# Patient Record
Sex: MALE | Race: WHITE | NOT HISPANIC OR LATINO | Employment: FULL TIME | URBAN - METROPOLITAN AREA
[De-identification: names, ages, dates, MRNs, and addresses within clinical notes are randomized per-mention and may not be internally consistent; named-entity substitution may affect disease eponyms.]

---

## 2020-03-31 ENCOUNTER — HOSPITAL ENCOUNTER (EMERGENCY)
Facility: HOSPITAL | Age: 35
Discharge: HOME | End: 2020-03-31
Attending: EMERGENCY MEDICINE

## 2020-03-31 VITALS
WEIGHT: 200 LBS | SYSTOLIC BLOOD PRESSURE: 135 MMHG | TEMPERATURE: 98 F | DIASTOLIC BLOOD PRESSURE: 80 MMHG | BODY MASS INDEX: 24.87 KG/M2 | HEART RATE: 85 BPM | RESPIRATION RATE: 18 BRPM | OXYGEN SATURATION: 99 % | HEIGHT: 75 IN

## 2020-03-31 DIAGNOSIS — R05.9 COUGH: ICD-10-CM

## 2020-03-31 DIAGNOSIS — J06.9 VIRAL URI WITH COUGH: Primary | ICD-10-CM

## 2020-03-31 LAB
INFLUENZA A, MOLECULAR: NEGATIVE
INFLUENZA B, MOLECULAR: NEGATIVE
SPECIMEN SOURCE: NORMAL

## 2020-03-31 PROCEDURE — 99283 EMERGENCY DEPT VISIT LOW MDM: CPT | Mod: 25

## 2020-03-31 PROCEDURE — 87502 INFLUENZA DNA AMP PROBE: CPT

## 2020-03-31 RX ORDER — PROMETHAZINE HYDROCHLORIDE AND DEXTROMETHORPHAN HYDROBROMIDE 6.25; 15 MG/5ML; MG/5ML
5 SYRUP ORAL 4 TIMES DAILY PRN
Qty: 240 ML | Refills: 0 | Status: SHIPPED | OUTPATIENT
Start: 2020-03-31 | End: 2020-04-10

## 2020-03-31 NOTE — ED PROVIDER NOTES
Encounter Date: 3/31/2020       History     Chief Complaint   Patient presents with    Cough     c/o SOB, Runny Nose, congestioin, sore throat, headache, ear pain. onset Friday      The history is provided by the patient.   URI   The primary symptoms include fever, fatigue and cough. Primary symptoms do not include sore throat, abdominal pain, nausea, vomiting or rash. The current episode started two days ago. This is a new problem. The problem has not changed since onset.  The cough is non-productive.   Symptoms associated with the illness include congestion and rhinorrhea. The following treatments were addressed: Acetaminophen was effective.     Review of patient's allergies indicates:   Allergen Reactions    Ultram [tramadol] Rash     Past Medical History:   Diagnosis Date    Back pain     Bulging disc     L5     No past surgical history on file.  No family history on file.  Social History     Tobacco Use    Smoking status: Current Every Day Smoker   Substance Use Topics    Alcohol use: No    Drug use: Not on file     Review of Systems   Constitutional: Positive for fatigue and fever. Negative for diaphoresis.   HENT: Positive for congestion and rhinorrhea. Negative for sore throat.    Eyes: Negative for photophobia and redness.   Respiratory: Positive for cough. Negative for shortness of breath.    Cardiovascular: Negative for chest pain.   Gastrointestinal: Negative for abdominal pain, constipation, diarrhea, nausea and vomiting.   Endocrine: Negative for polydipsia and polyphagia.   Genitourinary: Negative for dysuria and frequency.   Musculoskeletal: Negative for back pain.   Skin: Negative for rash.   Neurological: Negative for weakness.   Hematological: Does not bruise/bleed easily.   Psychiatric/Behavioral: The patient is not nervous/anxious.    All other systems reviewed and are negative.      Physical Exam     Initial Vitals [03/31/20 0816]   BP Pulse Resp Temp SpO2   (!) 170/83 104 18 98.8 °F  (37.1 °C) 99 %      MAP       --         Physical Exam    Nursing note and vitals reviewed.  Constitutional: He appears well-developed and well-nourished.   HENT:   Head: Normocephalic and atraumatic.   Right Ear: External ear normal.   Left Ear: External ear normal.   Nose: Nose normal.   Mouth/Throat: Oropharynx is clear and moist.   Eyes: Conjunctivae and EOM are normal. Pupils are equal, round, and reactive to light.   Neck: Normal range of motion. Neck supple.   Cardiovascular: Normal rate, regular rhythm, normal heart sounds and intact distal pulses.   Pulmonary/Chest: Breath sounds normal. No respiratory distress. He has no wheezes. He has no rhonchi. He has no rales.   Abdominal: Soft. Bowel sounds are normal. He exhibits no distension. There is no tenderness. There is no rebound and no guarding.   Musculoskeletal: Normal range of motion.   Neurological: He is alert and oriented to person, place, and time. He has normal strength.   Skin: Skin is warm and dry.   Psychiatric: He has a normal mood and affect. His behavior is normal. Judgment and thought content normal.         ED Course   Procedures  Labs Reviewed   INFLUENZA A & B BY MOLECULAR          Imaging Results    None                                          Clinical Impression:       ICD-10-CM ICD-9-CM   1. Viral URI with cough J06.9 465.9    B97.89    2. Cough R05 786.2         Disposition:   Disposition: Discharged  Condition: Stable                        KENNEDY Painting  03/31/20 0936

## 2021-06-07 ENCOUNTER — HOSPITAL ENCOUNTER (EMERGENCY)
Facility: HOSPITAL | Age: 36
Discharge: HOME | End: 2021-06-07
Attending: EMERGENCY MEDICINE
Payer: MEDICAID

## 2021-06-07 VITALS
DIASTOLIC BLOOD PRESSURE: 85 MMHG | WEIGHT: 203.69 LBS | BODY MASS INDEX: 25.33 KG/M2 | OXYGEN SATURATION: 99 % | TEMPERATURE: 99 F | HEIGHT: 75 IN | HEART RATE: 100 BPM | SYSTOLIC BLOOD PRESSURE: 158 MMHG | RESPIRATION RATE: 20 BRPM

## 2021-06-07 DIAGNOSIS — K02.9 DENTAL CARIES: Primary | ICD-10-CM

## 2021-06-07 PROCEDURE — 25000003 PHARM REV CODE 250: Performed by: EMERGENCY MEDICINE

## 2021-06-07 PROCEDURE — 99284 EMERGENCY DEPT VISIT MOD MDM: CPT | Mod: 25

## 2021-06-07 RX ORDER — AMOXICILLIN AND CLAVULANATE POTASSIUM 875; 125 MG/1; MG/1
1 TABLET, FILM COATED ORAL 2 TIMES DAILY
Qty: 10 TABLET | Refills: 0 | Status: SHIPPED | OUTPATIENT
Start: 2021-06-07 | End: 2021-06-12

## 2021-06-07 RX ORDER — ACETAMINOPHEN 500 MG
1000 TABLET ORAL
Status: COMPLETED | OUTPATIENT
Start: 2021-06-07 | End: 2021-06-07

## 2021-06-07 RX ORDER — KETOROLAC TROMETHAMINE 10 MG/1
10 TABLET, FILM COATED ORAL
Status: COMPLETED | OUTPATIENT
Start: 2021-06-07 | End: 2021-06-07

## 2021-06-07 RX ORDER — KETOROLAC TROMETHAMINE 10 MG/1
10 TABLET, FILM COATED ORAL EVERY 6 HOURS PRN
Qty: 20 TABLET | Refills: 0 | Status: SHIPPED | OUTPATIENT
Start: 2021-06-07 | End: 2022-11-28

## 2021-06-07 RX ADMIN — KETOROLAC TROMETHAMINE 10 MG: 10 TABLET, FILM COATED ORAL at 07:06

## 2021-06-07 RX ADMIN — ACETAMINOPHEN 1000 MG: 500 TABLET ORAL at 07:06

## 2022-09-22 ENCOUNTER — HOSPITAL ENCOUNTER (EMERGENCY)
Facility: HOSPITAL | Age: 37
Discharge: HOME | End: 2022-09-22
Attending: EMERGENCY MEDICINE
Payer: MEDICAID

## 2022-09-22 VITALS
RESPIRATION RATE: 18 BRPM | OXYGEN SATURATION: 97 % | DIASTOLIC BLOOD PRESSURE: 67 MMHG | TEMPERATURE: 98 F | WEIGHT: 190 LBS | BODY MASS INDEX: 23.62 KG/M2 | SYSTOLIC BLOOD PRESSURE: 121 MMHG | HEIGHT: 75 IN | HEART RATE: 73 BPM

## 2022-09-22 DIAGNOSIS — M54.50 ACUTE EXACERBATION OF CHRONIC LOW BACK PAIN: Primary | ICD-10-CM

## 2022-09-22 DIAGNOSIS — G89.29 ACUTE EXACERBATION OF CHRONIC LOW BACK PAIN: Primary | ICD-10-CM

## 2022-09-22 DIAGNOSIS — M54.32 SCIATICA OF LEFT SIDE: ICD-10-CM

## 2022-09-22 PROCEDURE — 25000003 PHARM REV CODE 250: Performed by: NURSE PRACTITIONER

## 2022-09-22 PROCEDURE — 63600175 PHARM REV CODE 636 W HCPCS: Performed by: NURSE PRACTITIONER

## 2022-09-22 PROCEDURE — 99284 EMERGENCY DEPT VISIT MOD MDM: CPT | Mod: 25

## 2022-09-22 PROCEDURE — 96372 THER/PROPH/DIAG INJ SC/IM: CPT | Performed by: NURSE PRACTITIONER

## 2022-09-22 RX ORDER — ONDANSETRON 8 MG/1
8 TABLET, ORALLY DISINTEGRATING ORAL
Status: COMPLETED | OUTPATIENT
Start: 2022-09-22 | End: 2022-09-22

## 2022-09-22 RX ORDER — DEXAMETHASONE SODIUM PHOSPHATE 4 MG/ML
10 INJECTION, SOLUTION INTRA-ARTICULAR; INTRALESIONAL; INTRAMUSCULAR; INTRAVENOUS; SOFT TISSUE
Status: COMPLETED | OUTPATIENT
Start: 2022-09-22 | End: 2022-09-22

## 2022-09-22 RX ORDER — TIZANIDINE 4 MG/1
4 TABLET ORAL EVERY 8 HOURS PRN
Qty: 20 TABLET | Refills: 0 | Status: SHIPPED | OUTPATIENT
Start: 2022-09-22 | End: 2022-10-02

## 2022-09-22 RX ORDER — MORPHINE SULFATE 4 MG/ML
8 INJECTION, SOLUTION INTRAMUSCULAR; INTRAVENOUS
Status: COMPLETED | OUTPATIENT
Start: 2022-09-22 | End: 2022-09-22

## 2022-09-22 RX ORDER — DICLOFENAC SODIUM 50 MG/1
50 TABLET, DELAYED RELEASE ORAL 3 TIMES DAILY PRN
Qty: 15 TABLET | Refills: 0 | Status: SHIPPED | OUTPATIENT
Start: 2022-09-22

## 2022-09-22 RX ADMIN — MORPHINE SULFATE 8 MG: 4 INJECTION INTRAVENOUS at 06:09

## 2022-09-22 RX ADMIN — DEXAMETHASONE SODIUM PHOSPHATE 10 MG: 4 INJECTION INTRA-ARTICULAR; INTRALESIONAL; INTRAMUSCULAR; INTRAVENOUS; SOFT TISSUE at 06:09

## 2022-09-22 RX ADMIN — ONDANSETRON 8 MG: 8 TABLET, ORALLY DISINTEGRATING ORAL at 06:09

## 2022-09-22 NOTE — Clinical Note
"Zane Horne (David) was seen and treated in our emergency department on 9/22/2022.  He may return to work on 09/24/2022.       If you have any questions or concerns, please don't hesitate to call.      Cheko Reynolds NP"

## 2022-09-23 NOTE — ED PROVIDER NOTES
HISTORY     Chief Complaint   Patient presents with    Back Pain     Pt complaining of lower back pain since this morning; hx of bulging discs     Review of patient's allergies indicates:   Allergen Reactions    Ultram [tramadol] Rash        HPI   The history is provided by the patient.   Back Pain   This is a recurrent problem. The current episode started today. The problem occurs throughout the day. The problem has been unchanged. The pain is associated with no known injury. The pain is present in the lumbar spine. The quality of the pain is described as aching. The pain does not radiate. The pain is at a severity of 10/10. The symptoms are aggravated by bending, twisting and certain positions. Associated symptoms include leg pain. Pertinent negatives include no chest pain, no fever, no abdominal pain, no abdominal swelling, no dysuria, no paresthesias, no paresis and no weakness. He has tried nothing for the symptoms. The treatment provided no relief. Risk factors: History of bulging disc.      PCP: Primary Doctor No     Past Medical History:  Past Medical History:   Diagnosis Date    Back pain     Bulging disc     L5        Past Surgical History:  No past surgical history on file.     Family History:  No family history on file.     Social History:  Social History     Tobacco Use    Smoking status: Every Day    Smokeless tobacco: Not on file   Substance and Sexual Activity    Alcohol use: No    Drug use: Not on file    Sexual activity: Not on file         ROS   Review of Systems   Constitutional:  Negative for fever.   HENT:  Negative for sore throat.    Respiratory:  Negative for shortness of breath.    Cardiovascular:  Negative for chest pain.   Gastrointestinal:  Negative for abdominal pain and nausea.   Genitourinary:  Negative for dysuria.   Musculoskeletal:  Positive for back pain.   Skin:  Negative for rash.   Neurological:  Negative for weakness and paresthesias.   Hematological:  Does not  "bruise/bleed easily.     PHYSICAL EXAM     Initial Vitals [09/22/22 1802]   BP Pulse Resp Temp SpO2   (!) 144/77 82 18 98.3 °F (36.8 °C) 97 %      MAP       --           Physical Exam    Constitutional: He appears well-developed and well-nourished. No distress.   HENT:   Head: Normocephalic and atraumatic.   Eyes: Conjunctivae are normal. Pupils are equal, round, and reactive to light.   Neck: Neck supple.   Normal range of motion.  Cardiovascular:  Normal rate, regular rhythm and normal heart sounds.           Pulmonary/Chest: Breath sounds normal.   Abdominal: Abdomen is soft. Bowel sounds are normal.   Musculoskeletal:         General: Normal range of motion.      Cervical back: Normal range of motion and neck supple.      Lumbar back: Spasms and tenderness present.        Back:      Neurological: He is alert and oriented to person, place, and time. No cranial nerve deficit. Coordination and gait normal.   Skin: Skin is warm and dry.   Psychiatric: He has a normal mood and affect.        ED COURSE   Procedures  ED ONGOING VITALS:  Vitals:    09/22/22 1802 09/22/22 1839 09/22/22 1939   BP: (!) 144/77  121/67   Pulse: 82  73   Resp: 18 (!) 22 18   Temp: 98.3 °F (36.8 °C)  98.3 °F (36.8 °C)   TempSrc: Oral     SpO2: 97%  97%   Weight: 86.2 kg (190 lb)     Height: 6' 3" (1.905 m)           ABNORMAL LAB VALUES:  Labs Reviewed - No data to display      ALL LAB VALUES:        RADIOLOGY STUDIES:  Imaging Results    None                   The above vital signs and test results have been reviewed by the emergency provider.     ED Medications:  Discharge Medication List as of 9/22/2022  7:38 PM        START taking these medications    Details   diclofenac (VOLTAREN) 50 MG EC tablet Take 1 tablet (50 mg total) by mouth 3 (three) times daily as needed., Starting Thu 9/22/2022, Print      tiZANidine (ZANAFLEX) 4 MG tablet Take 1 tablet (4 mg total) by mouth every 8 (eight) hours as needed., Starting Thu 9/22/2022, Until Sun " 10/2/2022 at 2359, Print           Discharge Medications:  Discharge Medication List as of 9/22/2022  7:38 PM        START taking these medications    Details   diclofenac (VOLTAREN) 50 MG EC tablet Take 1 tablet (50 mg total) by mouth 3 (three) times daily as needed., Starting Thu 9/22/2022, Print      tiZANidine (ZANAFLEX) 4 MG tablet Take 1 tablet (4 mg total) by mouth every 8 (eight) hours as needed., Starting u 9/22/2022, Until Sun 10/2/2022 at 2357, Print             Follow-up Information       Schedule an appointment as soon as possible for a visit  with PCP.               [facility]    Specialty: Emergency Medicine  Contact information:  [address] [facility] [id number] [phone]                          Pain relieving and patient is ready for discharge.    I discussed with patient and/or family/caretaker that evaluation in the ED does not suggest any emergent or life threatening medical conditions requiring immediate intervention beyond what was provided in the ED, and I believe patient is safe for discharge. Regardless, an unremarkable evaluation in the ED does not preclude the development or presence of a serious or life threatening condition. As such, patient was instructed to return immediately for any worsening or change in current symptoms.    Regarding SCIATIC PAIN, the patient has been counseled regarding a diagnosis of sciatica, including the possible sources for sciatic nerve pathology as well as the expectations for improvement.  There have been no focal neurologic findings at present.  Advised patient to take all medications as prescribed, follow up with primary care provider, and participate in activity as tolerated. The patient has been instructed to return to the ER immediately with any worsening symptoms including development of numbness, weakness, or incontinence.          MEDICAL DECISION MAKING                 CLINICAL IMPRESSION        ICD-10-CM ICD-9-CM   1. Acute exacerbation of chronic low back pain  M54.50 724.2    G89.29 338.19     338.29   2. Sciatica of left side  M54.32 724.3       Disposition:   Disposition: Discharged  Condition: Stable       Cheko Reynolds NP  09/22/22 7401

## 2022-11-28 ENCOUNTER — HOSPITAL ENCOUNTER (INPATIENT)
Facility: HOSPITAL | Age: 37
LOS: 7 days | Discharge: HOME | DRG: 853 | End: 2022-12-05
Attending: EMERGENCY MEDICINE | Admitting: INTERNAL MEDICINE
Payer: MEDICAID

## 2022-11-28 DIAGNOSIS — J90 PLEURAL EFFUSION: ICD-10-CM

## 2022-11-28 DIAGNOSIS — J18.9 PNEUMONIA OF LEFT LOWER LOBE DUE TO INFECTIOUS ORGANISM: ICD-10-CM

## 2022-11-28 DIAGNOSIS — R06.00 DYSPNEA: ICD-10-CM

## 2022-11-28 DIAGNOSIS — J96.01 ACUTE RESPIRATORY FAILURE WITH HYPOXIA: Primary | ICD-10-CM

## 2022-11-28 PROBLEM — Z72.0 TOBACCO USE: Status: ACTIVE | Noted: 2022-11-28

## 2022-11-28 PROBLEM — R07.1 CHEST PAIN ON BREATHING: Status: ACTIVE | Noted: 2022-11-28

## 2022-11-28 PROBLEM — A41.9 SEPSIS WITHOUT ACUTE ORGAN DYSFUNCTION: Status: ACTIVE | Noted: 2022-11-28

## 2022-11-28 LAB
ALBUMIN SERPL BCP-MCNC: 3.1 G/DL (ref 3.5–5.2)
ALP SERPL-CCNC: 128 U/L (ref 55–135)
ALT SERPL W/O P-5'-P-CCNC: 65 U/L (ref 10–44)
ANION GAP SERPL CALC-SCNC: 15 MMOL/L (ref 8–16)
APPEARANCE FLD: NORMAL
AST SERPL-CCNC: 51 U/L (ref 10–40)
BACTERIA #/AREA URNS HPF: NORMAL /HPF
BASOPHILS # BLD AUTO: 0.03 K/UL (ref 0–0.2)
BASOPHILS NFR BLD: 0.2 % (ref 0–1.9)
BILIRUB SERPL-MCNC: 3.4 MG/DL (ref 0.1–1)
BILIRUB UR QL STRIP: ABNORMAL
BNP SERPL-MCNC: <10 PG/ML (ref 0–99)
BODY FLD TYPE: NORMAL
BUN SERPL-MCNC: 12 MG/DL (ref 6–20)
CALCIUM SERPL-MCNC: 9.9 MG/DL (ref 8.7–10.5)
CHLORIDE SERPL-SCNC: 104 MMOL/L (ref 95–110)
CLARITY UR: CLEAR
CO2 SERPL-SCNC: 17 MMOL/L (ref 23–29)
COLOR FLD: NORMAL
COLOR UR: ABNORMAL
CREAT SERPL-MCNC: 0.8 MG/DL (ref 0.5–1.4)
D DIMER PPP IA.FEU-MCNC: 2.68 MG/L FEU
DIFFERENTIAL METHOD: ABNORMAL
EOSINOPHIL # BLD AUTO: 0 K/UL (ref 0–0.5)
EOSINOPHIL NFR BLD: 0.1 % (ref 0–8)
EOSINOPHIL NFR FLD MANUAL: 59 %
ERYTHROCYTE [DISTWIDTH] IN BLOOD BY AUTOMATED COUNT: 12.2 % (ref 11.5–14.5)
EST. GFR  (NO RACE VARIABLE): >60 ML/MIN/1.73 M^2
GLUCOSE SERPL-MCNC: 145 MG/DL (ref 70–110)
GLUCOSE UR QL STRIP: ABNORMAL
HCT VFR BLD AUTO: 39.2 % (ref 40–54)
HGB BLD-MCNC: 13.7 G/DL (ref 14–18)
HGB UR QL STRIP: NEGATIVE
HYALINE CASTS #/AREA URNS LPF: 0 /LPF
IMM GRANULOCYTES # BLD AUTO: 0.07 K/UL (ref 0–0.04)
IMM GRANULOCYTES NFR BLD AUTO: 0.4 % (ref 0–0.5)
INFLUENZA A, MOLECULAR: NEGATIVE
INFLUENZA B, MOLECULAR: NEGATIVE
KETONES UR QL STRIP: NEGATIVE
LACTATE SERPL-SCNC: 1.3 MMOL/L (ref 0.5–2.2)
LEUKOCYTE ESTERASE UR QL STRIP: NEGATIVE
LYMPHOCYTES # BLD AUTO: 0.7 K/UL (ref 1–4.8)
LYMPHOCYTES NFR BLD: 4.1 % (ref 18–48)
LYMPHOCYTES NFR FLD MANUAL: 2 %
MCH RBC QN AUTO: 27.8 PG (ref 27–31)
MCHC RBC AUTO-ENTMCNC: 34.9 G/DL (ref 32–36)
MCV RBC AUTO: 80 FL (ref 82–98)
MICROSCOPIC COMMENT: NORMAL
MONOCYTES # BLD AUTO: 1.2 K/UL (ref 0.3–1)
MONOCYTES NFR BLD: 6.9 % (ref 4–15)
MONOS+MACROS NFR FLD MANUAL: 9 %
NEUTROPHILS # BLD AUTO: 15.7 K/UL (ref 1.8–7.7)
NEUTROPHILS NFR BLD: 88.3 % (ref 38–73)
NEUTROPHILS NFR FLD MANUAL: 30 %
NITRITE UR QL STRIP: NEGATIVE
NRBC BLD-RTO: 0 /100 WBC
PH UR STRIP: 7 [PH] (ref 5–8)
PLATELET # BLD AUTO: 287 K/UL (ref 150–450)
PMV BLD AUTO: 9.5 FL (ref 9.2–12.9)
POTASSIUM SERPL-SCNC: 3.8 MMOL/L (ref 3.5–5.1)
PROT SERPL-MCNC: 8.3 G/DL (ref 6–8.4)
PROT UR QL STRIP: ABNORMAL
RBC # BLD AUTO: 4.93 M/UL (ref 4.6–6.2)
RBC #/AREA URNS HPF: 2 /HPF (ref 0–4)
SARS-COV-2 RDRP RESP QL NAA+PROBE: NEGATIVE
SODIUM SERPL-SCNC: 136 MMOL/L (ref 136–145)
SP GR UR STRIP: >1.03 (ref 1–1.03)
SPECIMEN SOURCE: NORMAL
TROPONIN I SERPL DL<=0.01 NG/ML-MCNC: <0.006 NG/ML (ref 0–0.03)
URN SPEC COLLECT METH UR: ABNORMAL
UROBILINOGEN UR STRIP-ACNC: >=8 EU/DL
WBC # BLD AUTO: 17.76 K/UL (ref 3.9–12.7)
WBC # FLD: NORMAL /CU MM
WBC #/AREA URNS HPF: 2 /HPF (ref 0–5)
WBC CLUMPS URNS QL MICRO: NORMAL

## 2022-11-28 PROCEDURE — 93010 ELECTROCARDIOGRAM REPORT: CPT | Mod: ,,, | Performed by: STUDENT IN AN ORGANIZED HEALTH CARE EDUCATION/TRAINING PROGRAM

## 2022-11-28 PROCEDURE — 99291 CRITICAL CARE FIRST HOUR: CPT | Mod: 25

## 2022-11-28 PROCEDURE — 88184 FLOWCYTOMETRY/ TC 1 MARKER: CPT | Performed by: PATHOLOGY

## 2022-11-28 PROCEDURE — 82945 GLUCOSE OTHER FLUID: CPT | Performed by: NURSE PRACTITIONER

## 2022-11-28 PROCEDURE — 88189 FLOWCYTOMETRY/READ 16 & >: CPT | Mod: ,,, | Performed by: PATHOLOGY

## 2022-11-28 PROCEDURE — 83615 LACTATE (LD) (LDH) ENZYME: CPT | Performed by: NURSE PRACTITIONER

## 2022-11-28 PROCEDURE — 94640 AIRWAY INHALATION TREATMENT: CPT

## 2022-11-28 PROCEDURE — 25000003 PHARM REV CODE 250: Performed by: NURSE PRACTITIONER

## 2022-11-28 PROCEDURE — 25000003 PHARM REV CODE 250: Performed by: INTERNAL MEDICINE

## 2022-11-28 PROCEDURE — 94761 N-INVAS EAR/PLS OXIMETRY MLT: CPT

## 2022-11-28 PROCEDURE — 89051 BODY FLUID CELL COUNT: CPT | Performed by: NURSE PRACTITIONER

## 2022-11-28 PROCEDURE — 93005 ELECTROCARDIOGRAM TRACING: CPT

## 2022-11-28 PROCEDURE — 25500020 PHARM REV CODE 255: Performed by: INTERNAL MEDICINE

## 2022-11-28 PROCEDURE — 87102 FUNGUS ISOLATION CULTURE: CPT | Performed by: NURSE PRACTITIONER

## 2022-11-28 PROCEDURE — 88189 PR  FLOWCYTOMETRY/READ, 16 & > MARKERS: ICD-10-PCS | Mod: ,,, | Performed by: PATHOLOGY

## 2022-11-28 PROCEDURE — 63600175 PHARM REV CODE 636 W HCPCS: Performed by: EMERGENCY MEDICINE

## 2022-11-28 PROCEDURE — 84157 ASSAY OF PROTEIN OTHER: CPT | Performed by: NURSE PRACTITIONER

## 2022-11-28 PROCEDURE — 25000242 PHARM REV CODE 250 ALT 637 W/ HCPCS: Performed by: EMERGENCY MEDICINE

## 2022-11-28 PROCEDURE — 99900035 HC TECH TIME PER 15 MIN (STAT)

## 2022-11-28 PROCEDURE — 81000 URINALYSIS NONAUTO W/SCOPE: CPT | Performed by: NURSE PRACTITIONER

## 2022-11-28 PROCEDURE — 87210 SMEAR WET MOUNT SALINE/INK: CPT | Performed by: NURSE PRACTITIONER

## 2022-11-28 PROCEDURE — 87116 MYCOBACTERIA CULTURE: CPT | Performed by: NURSE PRACTITIONER

## 2022-11-28 PROCEDURE — 27000221 HC OXYGEN, UP TO 24 HOURS

## 2022-11-28 PROCEDURE — 25000003 PHARM REV CODE 250: Performed by: EMERGENCY MEDICINE

## 2022-11-28 PROCEDURE — 87502 INFLUENZA DNA AMP PROBE: CPT | Performed by: EMERGENCY MEDICINE

## 2022-11-28 PROCEDURE — 20000000 HC ICU ROOM

## 2022-11-28 PROCEDURE — 93010 EKG 12-LEAD: ICD-10-PCS | Mod: ,,, | Performed by: STUDENT IN AN ORGANIZED HEALTH CARE EDUCATION/TRAINING PROGRAM

## 2022-11-28 PROCEDURE — 83880 ASSAY OF NATRIURETIC PEPTIDE: CPT | Performed by: EMERGENCY MEDICINE

## 2022-11-28 PROCEDURE — 96365 THER/PROPH/DIAG IV INF INIT: CPT | Mod: 59

## 2022-11-28 PROCEDURE — U0002 COVID-19 LAB TEST NON-CDC: HCPCS | Performed by: EMERGENCY MEDICINE

## 2022-11-28 PROCEDURE — 63600175 PHARM REV CODE 636 W HCPCS: Performed by: NURSE PRACTITIONER

## 2022-11-28 PROCEDURE — 85379 FIBRIN DEGRADATION QUANT: CPT | Performed by: NURSE PRACTITIONER

## 2022-11-28 PROCEDURE — 87206 SMEAR FLUORESCENT/ACID STAI: CPT | Performed by: NURSE PRACTITIONER

## 2022-11-28 PROCEDURE — 87205 SMEAR GRAM STAIN: CPT | Performed by: NURSE PRACTITIONER

## 2022-11-28 PROCEDURE — 84484 ASSAY OF TROPONIN QUANT: CPT | Performed by: EMERGENCY MEDICINE

## 2022-11-28 PROCEDURE — 80053 COMPREHEN METABOLIC PANEL: CPT | Performed by: EMERGENCY MEDICINE

## 2022-11-28 PROCEDURE — 87040 BLOOD CULTURE FOR BACTERIA: CPT | Mod: 59 | Performed by: EMERGENCY MEDICINE

## 2022-11-28 PROCEDURE — 87070 CULTURE OTHR SPECIMN AEROBIC: CPT | Performed by: NURSE PRACTITIONER

## 2022-11-28 PROCEDURE — 87081 CULTURE SCREEN ONLY: CPT | Performed by: NURSE PRACTITIONER

## 2022-11-28 PROCEDURE — 85025 COMPLETE CBC W/AUTO DIFF WBC: CPT | Performed by: EMERGENCY MEDICINE

## 2022-11-28 PROCEDURE — 83605 ASSAY OF LACTIC ACID: CPT | Performed by: NURSE PRACTITIONER

## 2022-11-28 PROCEDURE — 88185 FLOWCYTOMETRY/TC ADD-ON: CPT | Performed by: PATHOLOGY

## 2022-11-28 RX ORDER — POTASSIUM CHLORIDE 29.8 MG/ML
20 INJECTION INTRAVENOUS
Status: CANCELLED | OUTPATIENT
Start: 2022-11-28

## 2022-11-28 RX ORDER — MUPIROCIN 20 MG/G
OINTMENT TOPICAL 2 TIMES DAILY
Status: DISPENSED | OUTPATIENT
Start: 2022-11-28 | End: 2022-12-03

## 2022-11-28 RX ORDER — TALC
6 POWDER (GRAM) TOPICAL NIGHTLY PRN
Status: DISCONTINUED | OUTPATIENT
Start: 2022-11-28 | End: 2022-12-05 | Stop reason: HOSPADM

## 2022-11-28 RX ORDER — POTASSIUM CHLORIDE 14.9 MG/ML
40 INJECTION INTRAVENOUS EVERY 4 HOURS PRN
Status: CANCELLED | OUTPATIENT
Start: 2022-11-28

## 2022-11-28 RX ORDER — DEXTROSE MONOHYDRATE AND SODIUM CHLORIDE 5; .45 G/100ML; G/100ML
INJECTION, SOLUTION INTRAVENOUS CONTINUOUS
Status: CANCELLED | OUTPATIENT
Start: 2022-11-28

## 2022-11-28 RX ORDER — SERTRALINE HYDROCHLORIDE 50 MG/1
50 TABLET, FILM COATED ORAL DAILY
COMMUNITY
Start: 2022-09-05

## 2022-11-28 RX ORDER — DEXTROSE MONOHYDRATE 100 MG/ML
INJECTION, SOLUTION INTRAVENOUS
Status: CANCELLED | OUTPATIENT
Start: 2022-11-28

## 2022-11-28 RX ORDER — IPRATROPIUM BROMIDE AND ALBUTEROL SULFATE 2.5; .5 MG/3ML; MG/3ML
3 SOLUTION RESPIRATORY (INHALATION) EVERY 6 HOURS PRN
Status: DISCONTINUED | OUTPATIENT
Start: 2022-11-28 | End: 2022-12-05 | Stop reason: HOSPADM

## 2022-11-28 RX ORDER — PREDNISONE 20 MG/1
TABLET ORAL
COMMUNITY
Start: 2022-08-31 | End: 2022-11-28

## 2022-11-28 RX ORDER — ALPRAZOLAM 0.25 MG/1
0.25 TABLET ORAL 3 TIMES DAILY PRN
Status: DISCONTINUED | OUTPATIENT
Start: 2022-11-28 | End: 2022-12-05 | Stop reason: HOSPADM

## 2022-11-28 RX ORDER — ACETAMINOPHEN 325 MG/1
650 TABLET ORAL EVERY 6 HOURS PRN
Status: DISCONTINUED | OUTPATIENT
Start: 2022-11-28 | End: 2022-12-01

## 2022-11-28 RX ORDER — POTASSIUM CHLORIDE 14.9 MG/ML
20 INJECTION INTRAVENOUS
Status: CANCELLED | OUTPATIENT
Start: 2022-11-28

## 2022-11-28 RX ORDER — SERTRALINE HYDROCHLORIDE 50 MG/1
50 TABLET, FILM COATED ORAL NIGHTLY
Status: DISCONTINUED | OUTPATIENT
Start: 2022-11-28 | End: 2022-12-05 | Stop reason: HOSPADM

## 2022-11-28 RX ORDER — METHYLPREDNISOLONE SOD SUCC 125 MG
125 VIAL (EA) INJECTION
Status: DISCONTINUED | OUTPATIENT
Start: 2022-11-28 | End: 2022-11-28 | Stop reason: SDUPTHER

## 2022-11-28 RX ORDER — POTASSIUM CHLORIDE 7.45 MG/ML
10 INJECTION INTRAVENOUS
Status: CANCELLED | OUTPATIENT
Start: 2022-11-28

## 2022-11-28 RX ORDER — POTASSIUM CHLORIDE 29.8 MG/ML
40 INJECTION INTRAVENOUS EVERY 4 HOURS PRN
Status: CANCELLED | OUTPATIENT
Start: 2022-11-28

## 2022-11-28 RX ORDER — METHYLPREDNISOLONE SOD SUCC 125 MG
125 VIAL (EA) INJECTION
Status: COMPLETED | OUTPATIENT
Start: 2022-11-28 | End: 2022-11-28

## 2022-11-28 RX ORDER — POTASSIUM CHLORIDE 14.9 MG/ML
10 INJECTION INTRAVENOUS
Status: CANCELLED | OUTPATIENT
Start: 2022-11-28

## 2022-11-28 RX ORDER — HYDROCODONE BITARTRATE AND ACETAMINOPHEN 10; 325 MG/1; MG/1
1 TABLET ORAL EVERY 6 HOURS PRN
Status: DISCONTINUED | OUTPATIENT
Start: 2022-11-28 | End: 2022-11-30

## 2022-11-28 RX ORDER — SODIUM CHLORIDE 9 MG/ML
INJECTION, SOLUTION INTRAVENOUS CONTINUOUS
Status: DISCONTINUED | OUTPATIENT
Start: 2022-11-28 | End: 2022-11-29

## 2022-11-28 RX ORDER — SODIUM CHLORIDE 0.9 % (FLUSH) 0.9 %
10 SYRINGE (ML) INJECTION
Status: DISCONTINUED | OUTPATIENT
Start: 2022-11-28 | End: 2022-12-05 | Stop reason: HOSPADM

## 2022-11-28 RX ORDER — ONDANSETRON 2 MG/ML
4 INJECTION INTRAMUSCULAR; INTRAVENOUS EVERY 6 HOURS PRN
Status: DISCONTINUED | OUTPATIENT
Start: 2022-11-28 | End: 2022-11-30

## 2022-11-28 RX ORDER — ENOXAPARIN SODIUM 100 MG/ML
40 INJECTION SUBCUTANEOUS EVERY 24 HOURS
Status: DISCONTINUED | OUTPATIENT
Start: 2022-11-28 | End: 2022-11-28

## 2022-11-28 RX ORDER — SODIUM CHLORIDE 0.9 % (FLUSH) 0.9 %
10 SYRINGE (ML) INJECTION
Status: CANCELLED | OUTPATIENT
Start: 2022-11-28

## 2022-11-28 RX ORDER — IPRATROPIUM BROMIDE AND ALBUTEROL SULFATE 2.5; .5 MG/3ML; MG/3ML
3 SOLUTION RESPIRATORY (INHALATION)
Status: DISPENSED | OUTPATIENT
Start: 2022-11-28 | End: 2022-11-28

## 2022-11-28 RX ORDER — SODIUM CHLORIDE 450 MG/100ML
INJECTION, SOLUTION INTRAVENOUS CONTINUOUS
Status: CANCELLED | OUTPATIENT
Start: 2022-11-28

## 2022-11-28 RX ADMIN — CEFTRIAXONE 1 G: 1 INJECTION, SOLUTION INTRAVENOUS at 02:11

## 2022-11-28 RX ADMIN — SODIUM CHLORIDE: 0.9 INJECTION, SOLUTION INTRAVENOUS at 05:11

## 2022-11-28 RX ADMIN — SERTRALINE HYDROCHLORIDE 50 MG: 50 TABLET ORAL at 08:11

## 2022-11-28 RX ADMIN — VANCOMYCIN HYDROCHLORIDE 2250 MG: 10 INJECTION, POWDER, LYOPHILIZED, FOR SOLUTION INTRAVENOUS at 11:11

## 2022-11-28 RX ADMIN — MUPIROCIN: 20 OINTMENT TOPICAL at 08:11

## 2022-11-28 RX ADMIN — SODIUM CHLORIDE: 0.9 INJECTION, SOLUTION INTRAVENOUS at 08:11

## 2022-11-28 RX ADMIN — IPRATROPIUM BROMIDE AND ALBUTEROL SULFATE 3 ML: 2.5; .5 SOLUTION RESPIRATORY (INHALATION) at 01:11

## 2022-11-28 RX ADMIN — ONDANSETRON 4 MG: 2 INJECTION INTRAMUSCULAR; INTRAVENOUS at 11:11

## 2022-11-28 RX ADMIN — PIPERACILLIN SODIUM AND TAZOBACTAM SODIUM 4.5 G: 4; .5 INJECTION, POWDER, LYOPHILIZED, FOR SOLUTION INTRAVENOUS at 02:11

## 2022-11-28 RX ADMIN — Medication 6 MG: at 08:11

## 2022-11-28 RX ADMIN — HYDROCODONE BITARTRATE AND ACETAMINOPHEN 1 TABLET: 10; 325 TABLET ORAL at 02:11

## 2022-11-28 RX ADMIN — HYDROCODONE BITARTRATE AND ACETAMINOPHEN 1 TABLET: 10; 325 TABLET ORAL at 08:11

## 2022-11-28 RX ADMIN — METHYLPREDNISOLONE SODIUM SUCCINATE 125 MG: 125 INJECTION, POWDER, FOR SOLUTION INTRAMUSCULAR; INTRAVENOUS at 03:11

## 2022-11-28 RX ADMIN — SODIUM CHLORIDE 1000 ML: 0.9 INJECTION, SOLUTION INTRAVENOUS at 08:11

## 2022-11-28 RX ADMIN — IOHEXOL 100 ML: 350 INJECTION, SOLUTION INTRAVENOUS at 08:11

## 2022-11-28 RX ADMIN — ALPRAZOLAM 0.25 MG: 0.25 TABLET ORAL at 08:11

## 2022-11-28 RX ADMIN — AZITHROMYCIN MONOHYDRATE 500 MG: 500 INJECTION, POWDER, LYOPHILIZED, FOR SOLUTION INTRAVENOUS at 02:11

## 2022-11-28 RX ADMIN — PIPERACILLIN SODIUM AND TAZOBACTAM SODIUM 4.5 G: 4; .5 INJECTION, POWDER, LYOPHILIZED, FOR SOLUTION INTRAVENOUS at 11:11

## 2022-11-28 NOTE — PHARMACY MED REC
"Admission Medication History     The home medication history was taken by Len Weber.    You may go to "Admission" then "Reconcile Home Medications" tabs to review and/or act upon these items.     The home medication list has been updated by the Pharmacy department.   Please read ALL comments highlighted in yellow.   Please address this information as you see fit.    Feel free to contact us if you have any questions or require assistance.      The medications listed below were removed from the home medication list. Please reorder if appropriate:  Patient reports no longer taking the following medication(s):  TYLENOL #3  TORADOL 10MG  PREDNISONE 20MG    Medications listed below were obtained from: Analytic software- [employer] and Medical records  (Not in a hospital admission)      Len Weber  [id number]    Current Outpatient Medications on File Prior to Encounter   Medication Sig Dispense Refill Last Dose    diclofenac (VOLTAREN) 50 MG EC tablet Take 1 tablet (50 mg total) by mouth 3 (three) times daily as needed. 15 tablet 0 Past Month    sertraline (ZOLOFT) 50 MG tablet Take 50 mg by mouth once daily.   11/27/2022                           .        "

## 2022-11-28 NOTE — ASSESSMENT & PLAN NOTE
Patient with Hypoxic Respiratory failure which is Acute.  he is not on home oxygen. Supplemental oxygen was provided and noted-  .   Signs/symptoms of respiratory failure include- tachypnea and increased work of breathing. Contributing diagnoses includes - Pneumonia Labs and images were reviewed. Patient Has not had a recent ABG. Will treat underlying causes and adjust management of respiratory failure as follows.  - continue iv abx for pna  - duo nebs as needed  - wean o2 to keep sats > 90%

## 2022-11-28 NOTE — ASSESSMENT & PLAN NOTE
- CXR and CT imaging reviewed  - continue broad spec abx, vanc added, deescalate as able based on cultures and clinical course   - obtain MRSA swab   - thora today with IR for evaluation of pleural fluid  - concern for empyema given labs, presentation, and amount of discomfort from pt  - based on fluid study results will further develop plan moving forward   - continue with O2 as needed to maintain sat of 92% or better   - pain meds per primary team   - OOB and ambulate as able

## 2022-11-28 NOTE — ASSESSMENT & PLAN NOTE
- likely pleuritic in nature, worse with cough/deep breath  - tend troponin, have been negative thus far  - po pain medication prn

## 2022-11-28 NOTE — HPI
37 year old male with a known past medical history of depression and tobacco use (1 pack per day x 15 years) who presented to the ED with complaints of worsening shortness of breath x 3 days assocaited with productive cough and chest pain. Symptoms are constant and moderate in nature, no relieving or exacerbating factors.  Upon arrival in the ED, patient was noted to be tachypnic, tachycardiac, and hypoxiac with O2 sats mid 80's on room air.  Pt was placed on 2 liters NC with improvement.  Lab work notable for WBC 17, bili 3.4, ALT 65, AST 51, normal troponin and BNP.  EKG showed ST.  Chest xray with concern for pneumonia.  Patient was given fluid bolus, blood cultures drawn, started on abx, and breathing treatments.  [place] [facility] [place] Pulmonary consulted 11/28 for further evaluation and recommendations. CT surgery consulted and pt was taken to the OR 11/30 for VATS.

## 2022-11-28 NOTE — PROVIDER PROGRESS NOTES - EMERGENCY DEPT.
Encounter Date: 11/28/2022    ED Physician Progress Notes       SCRIBE NOTE: I, Eve Lee, am scribing for, and in the presence of,  Neftaly Shay MD.  Physician Statement: I, Neftaly Shay MD, personally performed the services described in this documentation as scribed by Eve Lee in my presence, and it is both accurate and complete.       2:20 AM: Dr. Chan transfers care of patient to Dr. Shay.    3:57 AM: Discussed case with Mariza Lopez NP ([facility]). Dr. Anthony agrees with current care and management of pt and accepts admission.   Admitting Service: Hospital Medicine   Admitting Physician: Dr. Anthony  Admit to: Inpatient     3:58 AM: Re-evaluated pt. I have discussed test results, shared treatment plan, and the need for admission with patient and family at bedside. Pt and family express understanding at this time and agree with all information. All questions answered. Pt and family have no further questions or concerns at this time. Pt is ready for admit.        Scribe Attestation:   Scribe #1: I performed the above scribed service and the documentation accurately describes the services I performed. I attest to the accuracy of the note.    Attending Attestation:           Physician Attestation for Scribe:  Physician Attestation Statement for Scribe #1: I, Neftaly Shay MD, reviewed documentation, as scribed by Eve Lee in my presence, and it is both accurate and complete.

## 2022-11-28 NOTE — OP NOTE
Pre Op Diagnosis: left loculated pleural effusion     Post Op Diagnosis: same     Procedure:  left thoracentesis     Procedure performed by: Thee HARMAN, Sina BABCOCK     Written Informed Consent Obtained: Yes     Specimen Removed:  yes     Estimated Blood Loss:  minimal     Findings: Local anesthesia and moderate sedation were used.     The patient tolerated the procedure well and there were no complications.      Disposition:  F/U in clinic or with ordering physician    Discharge instructions:  Light activity for 24 hours.  Remove band aid in 24 hours.  No baths (showers are appropriate).      Sterile technique was performed in the posterior left thorax, lidocaine was used as a local anesthetic.  250 ccs of dark anibal fluid removed and sent to lab.  Pt tolerated the procedure well without immediate complications.  Please see radiologist report for details. F/u with PCP and/or ordering physician.

## 2022-11-28 NOTE — HPI
Mr. Horne is a 37 year old male with a history of depression and tobacco use (1 pack per day x 15 years) who presents to the ED with complaints of worsening shortness of breath x 3 days assocaited with productive cough and chest pain, symptoms are constant and moderate in nature, no relieving or exacerbating factors.  Upon arrival in the ED, patient tachypneic, tachycardiac, o2 sats mid 80's on room air.  Placed on 2 liters with noted improvement.  Lab work notable for WBC 17, bili 3.4,ALT 65, AST 51, normal troponin and BNP.  EKG showed ST.  Lactic pending.  Chest xray showed PNA.  Patient was given bolus, blood cultures drawn, started on abx and breathing treatments.  He will be admitted to hospital medicine service for acute hypoxic resp failure/sepsis d/t pna.     At assessment, patient lying in bed, o2 sats stable on 2 liters, complaints of yellow tinged productive cough and chest pain that is worse with movement, cough or deep breath. Of note, earlier last month patient did have a lap cr and has since followed up with his surgeon and has been doing well since that time.     Code Status, Full  Surrogate Decision Maker, wife, Mary

## 2022-11-28 NOTE — SUBJECTIVE & OBJECTIVE
Past Medical History:   Diagnosis Date    Back pain     Bulging disc     L5       History reviewed. No pertinent surgical history.    Review of patient's allergies indicates:   Allergen Reactions    Ultram [tramadol] Rash       Family History    None       Tobacco Use    Smoking status: Every Day    Smokeless tobacco: Not on file   Substance and Sexual Activity    Alcohol use: No    Drug use: Not on file    Sexual activity: Not on file         Review of Systems   Constitutional:  Positive for activity change, chills and fatigue.   HENT:  Positive for dental problem.    Respiratory:  Positive for cough and shortness of breath.         Left pleuritic chest pain   All other systems reviewed and are negative.  Objective:     Vital Signs (Most Recent):  Temp: 98.7 °F (37.1 °C) (11/28/22 0053)  Pulse: (!) 119 (11/28/22 0942)  Resp: (!) 30 (11/28/22 0942)  BP: (!) 141/87 (11/28/22 0942)  SpO2: (!) 90 % (11/28/22 0942)   Vital Signs (24h Range):  Temp:  [98.7 °F (37.1 °C)] 98.7 °F (37.1 °C)  Pulse:  [111-122] 119  Resp:  [30-50] 30  SpO2:  [90 %-98 %] 90 %  BP: (140-163)/(85-99) 141/87     Weight: 87.4 kg (192 lb 10.9 oz)  Body mass index is 24.08 kg/m².      Intake/Output Summary (Last 24 hours) at 11/28/2022 1004  Last data filed at 11/28/2022 0405  Gross per 24 hour   Intake 301.6 ml   Output --   Net 301.6 ml       Physical Exam    Vents:       Lines/Drains/Airways       Peripheral Intravenous Line  Duration                  Peripheral IV - Single Lumen 11/28/22 0259 20 G Anterior;Distal;Left Upper Arm <1 day                    Significant Labs:    CBC/Anemia Profile:  Recent Labs   Lab 11/28/22 0256   WBC 17.76*   HGB 13.7*   HCT 39.2*      MCV 80*   RDW 12.2        Chemistries:  Recent Labs   Lab 11/28/22 0256      K 3.8      CO2 17*   BUN 12   CREATININE 0.8   CALCIUM 9.9   ALBUMIN 3.1*   PROT 8.3   BILITOT 3.4*   ALKPHOS 128   ALT 65*   AST 51*       All pertinent labs within the past 24 hours  have been reviewed.    Significant Imaging:   I have reviewed all pertinent imaging results/findings within the past 24 hours.

## 2022-11-28 NOTE — H&P
Formerly [facility] - Emergency Dept.  [facility] Medicine  History & Physical    Patient Name: Zane Horne Jr.  MRN: [id number]  Patient Class: IP- Inpatient  Admission Date: 11/28/2022  Attending Physician: Nik Anthony MD   Primary Care Provider: Primary Doctor No         Patient information was obtained from patient and ER records.     Subjective:     Principal Problem:Sepsis without acute organ dysfunction    Chief Complaint:   Chief Complaint   Patient presents with    Shortness of Breath     Pt states he has been dealing with worsening SOB and congestion for the past 3 days. Pt oxygen sat was 88 on RA. Pt is also having left sided chest pain. Pt given 100mcg fentanyl om route.          HPI:    Mr. Horne is a 37 year old male with a history of depression and tobacco use (1 pack per day x 15 years) who presents to the ED with complaints of worsening shortness of breath x 3 days assocaited with productive cough and chest pain, symptoms are constant and moderate in nature, no relieving or exacerbating factors.  Upon arrival in the ED, patient tachypneic, tachycardiac, o2 sats mid 80's on room air.  Placed on 2 liters with noted improvement.  Lab work notable for WBC 17, bili 3.4,ALT 65, AST 51, normal troponin and BNP.  EKG showed ST.  Lactic pending.  Chest xray showed PNA.  Patient was given bolus, blood cultures drawn, started on abx and breathing treatments.  He will be admitted to hospital medicine service for acute hypoxic resp failure/sepsis d/t pna.     At assessment, patient lying in bed, o2 sats stable on 2 liters, complaints of yellow tinged productive cough and chest pain that is worse with movement, cough or deep breath. Of note, earlier last month patient did have a lap cr and has since followed up with his surgeon and has been doing well since that time.     Code Status, Full  Surrogate Decision Maker, wife, Mary      Past Medical History:   Diagnosis Date    Back pain     Bulging disc     L5       No  past surgical history on file.    Review of patient's allergies indicates:   Allergen Reactions    Ultram [tramadol] Rash       No current facility-administered medications on file prior to encounter.     Current Outpatient Medications on File Prior to Encounter   Medication Sig    diclofenac (VOLTAREN) 50 MG EC tablet Take 1 tablet (50 mg total) by mouth 3 (three) times daily as needed.    predniSONE (DELTASONE) 20 MG tablet prednisone Take 2 tablet (oral) 1 time per day for 3 days 20220831 tablet 1 time per day oral 3 days active 20 MG    sertraline (ZOLOFT) 50 MG tablet Take 50 mg by mouth.    acetaminophen-codeine 300-30mg (TYLENOL #3) 300-30 mg Tab Take 1-2 tablets by mouth every 6 (six) hours as needed.    ketorolac (TORADOL) 10 mg tablet Take 1 tablet (10 mg total) by mouth every 6 (six) hours as needed for Pain.     Family History    None       Tobacco Use    Smoking status: Every Day    Smokeless tobacco: Not on file   Substance and Sexual Activity    Alcohol use: No    Drug use: Not on file    Sexual activity: Not on file     Review of Systems   Respiratory:  Positive for cough, chest tightness and shortness of breath.    Cardiovascular:  Positive for chest pain.   Gastrointestinal:  Positive for nausea.   Objective:     Vital Signs (Most Recent):  Temp: 98.7 °F (37.1 °C) (11/28/22 0053)  Pulse: (!) 120 (11/28/22 0329)  Resp: (!) 30 (11/28/22 0129)  BP: (!) 140/91 (11/28/22 0301)  SpO2: 96 % (11/28/22 0329)   Vital Signs (24h Range):  Temp:  [98.7 °F (37.1 °C)] 98.7 °F (37.1 °C)  Pulse:  [111-122] 120  Resp:  [30-50] 30  SpO2:  [92 %-98 %] 96 %  BP: (140-163)/(91-99) 140/91     Weight: 87.4 kg (192 lb 10.9 oz)  Body mass index is 24.08 kg/m².    Physical Exam  Vitals and nursing note reviewed.   HENT:      Mouth/Throat:      Comments: Poor dentition   Cardiovascular:      Rate and Rhythm: Regular rhythm. Tachycardia present.      Pulses: Normal pulses.      Heart sounds: Normal heart sounds.   Pulmonary:       Comments: Tachypnea, o2 2 liters, coarse breath sounds all lobes, no wheezing  Abdominal:      General: Bowel sounds are normal. There is no distension.      Palpations: Abdomen is soft.      Tenderness: There is no abdominal tenderness.   Musculoskeletal:         General: No swelling. Normal range of motion.   Skin:     General: Skin is warm and dry.   Neurological:      Mental Status: He is alert and oriented to person, place, and time.           Significant Labs: All pertinent labs within the past 24 hours have been reviewed.    Significant Imaging: I have reviewed all pertinent imaging results/findings within the past 24 hours.    Assessment/Plan:     * Sepsis without acute organ dysfunction  - tachypnea, tachycardia with elevated WBC on admission, likely source pna  -lactic pending  -ivf  -iv abx  -follow-up blood cultures      Acute respiratory failure with hypoxia  Patient with Hypoxic Respiratory failure which is Acute.  he is not on home oxygen. Supplemental oxygen was provided and noted-  .   Signs/symptoms of respiratory failure include- tachypnea and increased work of breathing. Contributing diagnoses includes - Pneumonia Labs and images were reviewed. Patient Has not had a recent ABG. Will treat underlying causes and adjust management of respiratory failure as follows.  - continue iv abx for pna  - duo nebs as needed  - wean o2 to keep sats > 90%      PNA (pneumonia)  - as seen on imaging   - IV abx azithromycin and rocephin      Tobacco use  -1 pack per day x 15 years approx  -decline nicotine patch  -time spent on smoking cessation counseling < 5 mins.    Chest pain on breathing  - likely pleuritic in nature, worse with cough/deep breath  - tend troponin, have been negative thus far  - po pain medication prn        VTE Risk Mitigation (From admission, onward)           Ordered     enoxaparin injection 40 mg  Daily         11/28/22 0424     Place sequential compression device  Until discontinued          11/28/22 0424                       Mariza Lopez NP  Department of Hospital Medicine   Formerly [facility] - Emergency Dept.

## 2022-11-28 NOTE — PROGRESS NOTES
Pharmacokinetic Initial Assessment: IV Vancomycin    Assessment/Plan:    Initiate intravenous vancomycin with loading dose of 2250 mg once followed by a maintenance dose of vancomycin 1500mg IV every 12 hours  Desired empiric serum trough concentration is 15 to 20 mcg/mL  Draw vancomycin trough level 60 min prior to fourth dose on 11/29 at approximately 2300  Pharmacy will continue to follow and monitor vancomycin.      Please contact pharmacy at extension [phone] with any questions regarding this assessment.     Thank you for the consult,   Leslee Case       Patient brief summary:  Zane Horne Jr. is a 37 y.o. male initiated on antimicrobial therapy with IV Vancomycin for treatment of suspected lower respiratory infection    Drug Allergies:   Review of patient's allergies indicates:   Allergen Reactions    Ultram [tramadol] Rash       Actual Body Weight:   89kg    Renal Function:   Estimated Creatinine Clearance: 154 mL/min (based on SCr of 0.8 mg/dL).,     Dialysis Method (if applicable):  N/A    CBC (last 72 hours):  Recent Labs   Lab Result Units 11/28/22  0256   WBC K/uL 17.76*   Hemoglobin g/dL 13.7*   Hematocrit % 39.2*   Platelets K/uL 287   Gran % % 88.3*   Lymph % % 4.1*   Mono % % 6.9   Eosinophil % % 0.1   Basophil % % 0.2   Differential Method  Automated       Metabolic Panel (last 72 hours):  Recent Labs   Lab Result Units 11/28/22  0256 11/28/22  0813   Sodium mmol/L 136  --    Potassium mmol/L 3.8  --    Chloride mmol/L 104  --    CO2 mmol/L 17*  --    Glucose mg/dL 145*  --    Glucose, UA   --  Trace*   BUN mg/dL 12  --    Creatinine mg/dL 0.8  --    Albumin g/dL 3.1*  --    Total Bilirubin mg/dL 3.4*  --    Alkaline Phosphatase U/L 128  --    AST U/L 51*  --    ALT U/L 65*  --        Drug levels (last 3 results):  No results for input(s): VANCOMYCINRA, VANCORANDOM, VANCOMYCINPE, VANCOPEAK, VANCOMYCINTR, VANCOTROUGH in the last 72 hours.    Microbiologic Results:  Microbiology Results  (last 7 days)       Procedure Component Value Units Date/Time    Fungus culture [[id number]] Collected: 11/28/22 1124    Order Status: Sent Specimen: Pleural Fluid Updated: 11/28/22 1256    KOH prep [[id number]] Collected: 11/28/22 1124    Order Status: Sent Specimen: Pleural Fluid Updated: 11/28/22 1255    AFB Culture & Smear [[id number]] Collected: 11/28/22 1124    Order Status: Sent Specimen: Pleural Fluid Updated: 11/28/22 1254    Culture, Body Fluid (Aerobic) w/ GS [[id number]] Collected: 11/28/22 1124    Order Status: Sent Specimen: Pleural Fluid Updated: 11/28/22 1253    Culture, MRSA [[id number]] Collected: 11/28/22 1152    Order Status: Sent Specimen: MRSA source from Nares, Right Updated: 11/28/22 1223    Blood culture #1 **CANNOT BE ORDERED STAT** [[id number]] Collected: 11/28/22 0240    Order Status: Sent Specimen: Blood from Peripheral, Antecubital, Left Updated: 11/28/22 0927    Blood culture #2 **CANNOT BE ORDERED STAT** [[id number]] Collected: 11/28/22 0303    Order Status: Sent Specimen: Blood from Peripheral, Antecubital, Right Updated: 11/28/22 0927    Culture, Respiratory with Gram Stain [[id number]]     Order Status: No result Specimen: Respiratory     Influenza A & B by Molecular [[id number]] Collected: 11/28/22 0131    Order Status: Completed Specimen: Nasopharyngeal Swab Updated: 11/28/22 0203     Influenza A, Molecular Negative     Influenza B, Molecular Negative     Flu A & B Source NP

## 2022-11-28 NOTE — ED PROVIDER NOTES
Encounter Date: 11/28/2022       History     Chief Complaint   Patient presents with    Shortness of Breath     Pt states he has been dealing with worsening SOB and congestion for the past 3 days. Pt oxygen sat was 88 on RA. Pt is also having left sided chest pain. Pt given 100mcg fentanyl om route.       HPI  37-year-old white male presents with worsening shortness of breath over the last 3 days.  Found tachypneic and hypoxic on arrival of EMS.  They provided oxygen his sats went from the upper 80 into the mid 90s.  He notes bilateral chest pain with cough runny nose and sore throat.  Has subjective fever.  Denies any cardiac history or underlying pulmonary history.    Review of patient's allergies indicates:   Allergen Reactions    Ultram [tramadol] Rash     Past Medical History:   Diagnosis Date    Back pain     Bulging disc     L5     No past surgical history on file.  No family history on file.  Social History     Tobacco Use    Smoking status: Every Day   Substance Use Topics    Alcohol use: No     Review of Systems   Constitutional:  Positive for chills and fever.   HENT:  Positive for congestion and rhinorrhea.    Respiratory:  Positive for cough, shortness of breath and wheezing.    Cardiovascular:  Positive for chest pain. Negative for palpitations and leg swelling.   All other systems reviewed and are negative.    Physical Exam     Initial Vitals [11/28/22 0053]   BP Pulse Resp Temp SpO2   (!) 155/99 (!) 118 (!) 50 98.7 °F (37.1 °C) 97 %      MAP       --         Physical Exam  Nursing Notes and Vital Signs Reviewed.  Constitutional: Patient is in moderate distress. Well-developed and well-nourished.  Patient is a bit tachypneic and appears to be ill.  Head: Atraumatic. Normocephalic.  Eyes:  EOM intact.  No scleral icterus.  ENT: Mucous membranes are moist.  Nares with some congestion.  0 P clear.  Neck:  Full ROM. No JVD.  Cardiovascular: Regular rate. Regular rhythm No murmurs, rubs, or gallops. Distal  pulses are 2+ and symmetric  Pulmonary/Chest:  Tachypneic with wheezing in all lung fields.  He has bilateral pleural rubs.  Abdominal: Soft and non-distended.  There is no tenderness.  No rebound, guarding, or rigidity. Good bowel sounds.  Genitourinary: No CVA tenderness.  No suprapubic tenderness  Musculoskeletal: Moves all extremities. No obvious deformities.  5 x 5 strength in all extremities   Skin: Warm and dry.  Neurological:  Alert, awake, and appropriate.  Normal speech.  No acute focal neurological deficits are appreciated.  Two through 12 intact bilaterally.  Psychiatric: Normal affect. Good eye contact. Appropriate in content.    ED Course   Critical Care    Date/Time: 11/28/2022 1:26 AM  Performed by: Tony Chan Jr., MD  Authorized by: Tony Chan Jr., MD   Direct patient critical care time: 14 minutes  Additional history critical care time: 5 minutes  Ordering / reviewing critical care time: 10 minutes  Documentation critical care time: 10 minutes  Total critical care time (exclusive of procedural time) : 39 minutes  Critical care time was exclusive of separately billable procedures and treating other patients and teaching time.  Critical care was necessary to treat or prevent imminent or life-threatening deterioration of the following conditions: respiratory failure.  Critical care was time spent personally by me on the following activities: development of treatment plan with patient or surrogate, interpretation of cardiac output measurements, evaluation of patient's response to treatment, examination of patient, obtaining history from patient or surrogate, ordering and performing treatments and interventions, ordering and review of laboratory studies, ordering and review of radiographic studies, pulse oximetry, re-evaluation of patient's condition and review of old charts.      Labs Reviewed   INFLUENZA A & B BY MOLECULAR   CULTURE, BLOOD   CULTURE, BLOOD   CBC W/ AUTO DIFFERENTIAL    COMPREHENSIVE METABOLIC PANEL   B-TYPE NATRIURETIC PEPTIDE   TROPONIN I   SARS-COV-2 RNA AMPLIFICATION, QUAL     EKG Readings: (Independently Interpreted)   Initial Reading: No STEMI. Rhythm: Sinus Tachycardia. Heart Rate: 119. Ectopy: No Ectopy. ST Segments: Normal ST Segments. T Waves: Normal.     Imaging Results              X-Ray Chest AP Portable (In process)                      Medications   methylPREDNISolone sodium succinate injection 125 mg (has no administration in time range)   albuterol-ipratropium 2.5 mg-0.5 mg/3 mL nebulizer solution 3 mL (3 mLs Nebulization Given 11/28/22 8283)                              Clinical Impression:   Final diagnoses:  [R06.00] Dyspnea  [J96.01] Acute respiratory failure with hypoxia (Primary)               Tony Chan Jr., MD  11/28/22 8145

## 2022-11-28 NOTE — SUBJECTIVE & OBJECTIVE
Past Medical History:   Diagnosis Date    Back pain     Bulging disc     L5       No past surgical history on file.    Review of patient's allergies indicates:   Allergen Reactions    Ultram [tramadol] Rash       No current facility-administered medications on file prior to encounter.     Current Outpatient Medications on File Prior to Encounter   Medication Sig    diclofenac (VOLTAREN) 50 MG EC tablet Take 1 tablet (50 mg total) by mouth 3 (three) times daily as needed.    predniSONE (DELTASONE) 20 MG tablet prednisone Take 2 tablet (oral) 1 time per day for 3 days 20220831 tablet 1 time per day oral 3 days active 20 MG    sertraline (ZOLOFT) 50 MG tablet Take 50 mg by mouth.    acetaminophen-codeine 300-30mg (TYLENOL #3) 300-30 mg Tab Take 1-2 tablets by mouth every 6 (six) hours as needed.    ketorolac (TORADOL) 10 mg tablet Take 1 tablet (10 mg total) by mouth every 6 (six) hours as needed for Pain.     Family History    None       Tobacco Use    Smoking status: Every Day    Smokeless tobacco: Not on file   Substance and Sexual Activity    Alcohol use: No    Drug use: Not on file    Sexual activity: Not on file     Review of Systems   Respiratory:  Positive for cough, chest tightness and shortness of breath.    Cardiovascular:  Positive for chest pain.   Gastrointestinal:  Positive for nausea.   Objective:     Vital Signs (Most Recent):  Temp: 98.7 °F (37.1 °C) (11/28/22 0053)  Pulse: (!) 120 (11/28/22 0329)  Resp: (!) 30 (11/28/22 0129)  BP: (!) 140/91 (11/28/22 0301)  SpO2: 96 % (11/28/22 0329)   Vital Signs (24h Range):  Temp:  [98.7 °F (37.1 °C)] 98.7 °F (37.1 °C)  Pulse:  [111-122] 120  Resp:  [30-50] 30  SpO2:  [92 %-98 %] 96 %  BP: (140-163)/(91-99) 140/91     Weight: 87.4 kg (192 lb 10.9 oz)  Body mass index is 24.08 kg/m².    Physical Exam  Vitals and nursing note reviewed.   HENT:      Mouth/Throat:      Comments: Poor dentition   Cardiovascular:      Rate and Rhythm: Regular rhythm. Tachycardia  present.      Pulses: Normal pulses.      Heart sounds: Normal heart sounds.   Pulmonary:      Comments: Tachypnea, o2 2 liters, coarse breath sounds all lobes, no wheezing  Abdominal:      General: Bowel sounds are normal. There is no distension.      Palpations: Abdomen is soft.      Tenderness: There is no abdominal tenderness.   Musculoskeletal:         General: No swelling. Normal range of motion.   Skin:     General: Skin is warm and dry.   Neurological:      Mental Status: He is alert and oriented to person, place, and time.           Significant Labs: All pertinent labs within the past 24 hours have been reviewed.    Significant Imaging: I have reviewed all pertinent imaging results/findings within the past 24 hours.

## 2022-11-28 NOTE — CONSULTS
[facility] - Emergency Dept.  Pulmonology  Consult Note    Patient Name: Zane Horne Jr.  MRN: [id number]  Admission Date: 11/28/2022  Hospital Length of Stay: 0 days  Code Status: No Order  Attending Physician: Nik Anthony MD  Primary Care Provider: Primary Doctor No   Principal Problem: Sepsis without acute organ dysfunction      Subjective:     HPI:  37 year old male with a known past medical history of depression and tobacco use (1 pack per day x 15 years) who presented to the ED with complaints of worsening shortness of breath x 3 days assocaited with productive cough and chest pain. Symptoms are constant and moderate in nature, no relieving or exacerbating factors.  Upon arrival in the ED, patient was noted to be tachypnic, tachycardiac, and hypoxiac with O2 sats mid 80's on room air.  Pt was placed on 2 liters NC with improvement.  Lab work notable for WBC 17, bili 3.4, ALT 65, AST 51, normal troponin and BNP.  EKG showed ST.  Chest xray with concern for pneumonia.  Patient was given fluid bolus, blood cultures drawn, started on abx, and breathing treatments.  Pt was admitted to the hospital per [facility] for pneumonia. Pulmonary consulted 11/28 for further evaluation and recommendations.    At the time of my exam in the ER, pt awake and alert in NAD on 5L NC although he does appear uncomfortable and reports he's in pain pointing to the left lower chest. He denies any fevers, although he reports he hasn't checked his temp, does endorse some chills at times. Denies N/V, abd pain. He denies any recent viral illness or pneumonia. He has multiple bad teeth and dental carries. Significant other at bedside.       Past Medical History:   Diagnosis Date    Back pain     Bulging disc     L5       History reviewed. No pertinent surgical history.    Review of patient's allergies indicates:   Allergen Reactions    Ultram [tramadol] Rash       Family History    None       Tobacco Use    Smoking status: Every Day     Smokeless tobacco: Not on file   Substance and Sexual Activity    Alcohol use: No    Drug use: Not on file    Sexual activity: Not on file         Review of Systems   Constitutional:  Positive for activity change, chills and fatigue.   HENT:  Positive for dental problem.    Respiratory:  Positive for cough and shortness of breath.         Left pleuritic chest pain   All other systems reviewed and are negative.  Objective:     Vital Signs (Most Recent):  Temp: 98.7 °F (37.1 °C) (11/28/22 0053)  Pulse: (!) 119 (11/28/22 0942)  Resp: (!) 30 (11/28/22 0942)  BP: (!) 141/87 (11/28/22 0942)  SpO2: (!) 90 % (11/28/22 0942)   Vital Signs (24h Range):  Temp:  [98.7 °F (37.1 °C)] 98.7 °F (37.1 °C)  Pulse:  [111-122] 119  Resp:  [30-50] 30  SpO2:  [90 %-98 %] 90 %  BP: (140-163)/(85-99) 141/87     Weight: 87.4 kg (192 lb 10.9 oz)  Body mass index is 24.08 kg/m².      Intake/Output Summary (Last 24 hours) at 11/28/2022 1004  Last data filed at 11/28/2022 0405  Gross per 24 hour   Intake 301.6 ml   Output --   Net 301.6 ml       Physical Exam    Vents:       Lines/Drains/Airways       Peripheral Intravenous Line  Duration                  Peripheral IV - Single Lumen 11/28/22 0259 20 G Anterior;Distal;Left Upper Arm <1 day                    Significant Labs:    CBC/Anemia Profile:  Recent Labs   Lab 11/28/22 0256   WBC 17.76*   HGB 13.7*   HCT 39.2*      MCV 80*   RDW 12.2        Chemistries:  Recent Labs   Lab 11/28/22 0256      K 3.8      CO2 17*   BUN 12   CREATININE 0.8   CALCIUM 9.9   ALBUMIN 3.1*   PROT 8.3   BILITOT 3.4*   ALKPHOS 128   ALT 65*   AST 51*       All pertinent labs within the past 24 hours have been reviewed.    Significant Imaging:   I have reviewed all pertinent imaging results/findings within the past 24 hours.      ABG  No results for input(s): PH, PO2, PCO2, HCO3, BE in the last 168 hours.  Assessment/Plan:     * Sepsis without acute organ dysfunction  - see pleural effusion        Pleural effusion  - CXR and CT imaging reviewed  - continue broad spec abx, vanc added, deescalate as able based on cultures and clinical course   - obtain MRSA swab   - thora today with IR for evaluation of pleural fluid  - concern for empyema given labs, presentation, and amount of discomfort from pt  - based on fluid study results will further develop plan moving forward   - continue with O2 as needed to maintain sat of 92% or better   - pain meds per primary team   - OOB and ambulate as able     Tobacco use  - cessation education  - nicotine patch if needed     Chest pain on breathing  - see pleural effusion       PNA (pneumonia)  - see pleural effusion       Acute respiratory failure with hypoxia  - see pleural effusion           Thank you for your consult. I will follow-up with patient. Please contact us if you have any additional questions.     Forest Wan NP  Pulmonology  [facility] - Emergency Dept.

## 2022-11-28 NOTE — Clinical Note
Diagnosis: Acute respiratory failure with hypoxia [321355]   Admitting Provider:: AYAKA PHIPPS [[id number]]   Future Attending Provider: AYAKA PHIPPS [[id number]]   Reason for IP Medical Treatment  (Clinical interventions that can only be accomplished in the IP setting? ) :: Pneumonia   Estimated Length of Stay:: 2 midnights   I certify that Inpatient services for greater than or equal to 2 midnights are medically necessary:: No   Plans for Post-Acute care--if anticipated (pick the single best option):: A. No post acute care anticipated at this time

## 2022-11-28 NOTE — ASSESSMENT & PLAN NOTE
- tachypnea, tachycardia with elevated WBC on admission, likely source pna  -lactic pending  -ivf  -iv abx  -follow-up blood cultures

## 2022-11-29 ENCOUNTER — ANESTHESIA EVENT (OUTPATIENT)
Dept: SURGERY | Facility: HOSPITAL | Age: 37
DRG: 853 | End: 2022-11-29
Payer: MEDICAID

## 2022-11-29 LAB
ALBUMIN SERPL BCP-MCNC: 2.6 G/DL (ref 3.5–5.2)
ALP SERPL-CCNC: 118 U/L (ref 55–135)
ALT SERPL W/O P-5'-P-CCNC: 63 U/L (ref 10–44)
ANION GAP SERPL CALC-SCNC: 13 MMOL/L (ref 8–16)
ANION GAP SERPL CALC-SCNC: 13 MMOL/L (ref 8–16)
APTT BLDCRRT: 30 SEC (ref 21–32)
AST SERPL-CCNC: 57 U/L (ref 10–40)
BASOPHILS # BLD AUTO: 0.03 K/UL (ref 0–0.2)
BASOPHILS NFR BLD: 0.2 % (ref 0–1.9)
BILIRUB SERPL-MCNC: 2.4 MG/DL (ref 0.1–1)
BUN SERPL-MCNC: 12 MG/DL (ref 6–20)
BUN SERPL-MCNC: 14 MG/DL (ref 6–20)
CALCIUM SERPL-MCNC: 8.9 MG/DL (ref 8.7–10.5)
CALCIUM SERPL-MCNC: 9.2 MG/DL (ref 8.7–10.5)
CHLORIDE SERPL-SCNC: 105 MMOL/L (ref 95–110)
CHLORIDE SERPL-SCNC: 106 MMOL/L (ref 95–110)
CO2 SERPL-SCNC: 17 MMOL/L (ref 23–29)
CO2 SERPL-SCNC: 18 MMOL/L (ref 23–29)
CREAT SERPL-MCNC: 0.8 MG/DL (ref 0.5–1.4)
CREAT SERPL-MCNC: 0.8 MG/DL (ref 0.5–1.4)
DIFFERENTIAL METHOD: ABNORMAL
EOSINOPHIL # BLD AUTO: 0 K/UL (ref 0–0.5)
EOSINOPHIL NFR BLD: 0.1 % (ref 0–8)
ERYTHROCYTE [DISTWIDTH] IN BLOOD BY AUTOMATED COUNT: 12.1 % (ref 11.5–14.5)
EST. GFR  (NO RACE VARIABLE): >60 ML/MIN/1.73 M^2
EST. GFR  (NO RACE VARIABLE): >60 ML/MIN/1.73 M^2
GLUCOSE SERPL-MCNC: 100 MG/DL (ref 70–110)
GLUCOSE SERPL-MCNC: 126 MG/DL (ref 70–110)
HCT VFR BLD AUTO: 34.8 % (ref 40–54)
HGB BLD-MCNC: 11.7 G/DL (ref 14–18)
IMM GRANULOCYTES # BLD AUTO: 0.13 K/UL (ref 0–0.04)
IMM GRANULOCYTES NFR BLD AUTO: 0.8 % (ref 0–0.5)
INR PPP: 1 (ref 0.8–1.2)
KOH PREP SPEC: NORMAL
LYMPHOCYTES # BLD AUTO: 1.4 K/UL (ref 1–4.8)
LYMPHOCYTES NFR BLD: 8 % (ref 18–48)
MAGNESIUM SERPL-MCNC: 1.9 MG/DL (ref 1.6–2.6)
MCH RBC QN AUTO: 27.1 PG (ref 27–31)
MCHC RBC AUTO-ENTMCNC: 33.6 G/DL (ref 32–36)
MCV RBC AUTO: 81 FL (ref 82–98)
MONOCYTES # BLD AUTO: 1.1 K/UL (ref 0.3–1)
MONOCYTES NFR BLD: 6.5 % (ref 4–15)
NEUTROPHILS # BLD AUTO: 14.3 K/UL (ref 1.8–7.7)
NEUTROPHILS NFR BLD: 84.4 % (ref 38–73)
NRBC BLD-RTO: 0 /100 WBC
PATH INTERP FLD-IMP: NORMAL
PLATELET # BLD AUTO: 293 K/UL (ref 150–450)
PMV BLD AUTO: 10.3 FL (ref 9.2–12.9)
POTASSIUM SERPL-SCNC: 3.6 MMOL/L (ref 3.5–5.1)
POTASSIUM SERPL-SCNC: 3.7 MMOL/L (ref 3.5–5.1)
PROT SERPL-MCNC: 7.3 G/DL (ref 6–8.4)
PROTHROMBIN TIME: 10.9 SEC (ref 9–12.5)
RBC # BLD AUTO: 4.31 M/UL (ref 4.6–6.2)
SODIUM SERPL-SCNC: 136 MMOL/L (ref 136–145)
SODIUM SERPL-SCNC: 136 MMOL/L (ref 136–145)
VANCOMYCIN TROUGH SERPL-MCNC: 7.4 UG/ML (ref 10–22)
WBC # BLD AUTO: 16.95 K/UL (ref 3.9–12.7)

## 2022-11-29 PROCEDURE — 94799 UNLISTED PULMONARY SVC/PX: CPT

## 2022-11-29 PROCEDURE — 27000221 HC OXYGEN, UP TO 24 HOURS

## 2022-11-29 PROCEDURE — 25000003 PHARM REV CODE 250: Performed by: NURSE PRACTITIONER

## 2022-11-29 PROCEDURE — 99900035 HC TECH TIME PER 15 MIN (STAT)

## 2022-11-29 PROCEDURE — 80053 COMPREHEN METABOLIC PANEL: CPT | Performed by: NURSE PRACTITIONER

## 2022-11-29 PROCEDURE — 86901 BLOOD TYPING SEROLOGIC RH(D): CPT | Performed by: THORACIC SURGERY (CARDIOTHORACIC VASCULAR SURGERY)

## 2022-11-29 PROCEDURE — 85610 PROTHROMBIN TIME: CPT | Performed by: THORACIC SURGERY (CARDIOTHORACIC VASCULAR SURGERY)

## 2022-11-29 PROCEDURE — 20000000 HC ICU ROOM

## 2022-11-29 PROCEDURE — 85025 COMPLETE CBC W/AUTO DIFF WBC: CPT | Performed by: NURSE PRACTITIONER

## 2022-11-29 PROCEDURE — 80202 ASSAY OF VANCOMYCIN: CPT | Performed by: INTERNAL MEDICINE

## 2022-11-29 PROCEDURE — 36415 COLL VENOUS BLD VENIPUNCTURE: CPT | Performed by: INTERNAL MEDICINE

## 2022-11-29 PROCEDURE — 63600175 PHARM REV CODE 636 W HCPCS: Performed by: NURSE PRACTITIONER

## 2022-11-29 PROCEDURE — 83036 HEMOGLOBIN GLYCOSYLATED A1C: CPT | Performed by: THORACIC SURGERY (CARDIOTHORACIC VASCULAR SURGERY)

## 2022-11-29 PROCEDURE — 94761 N-INVAS EAR/PLS OXIMETRY MLT: CPT

## 2022-11-29 PROCEDURE — 85730 THROMBOPLASTIN TIME PARTIAL: CPT | Performed by: THORACIC SURGERY (CARDIOTHORACIC VASCULAR SURGERY)

## 2022-11-29 PROCEDURE — 36415 COLL VENOUS BLD VENIPUNCTURE: CPT | Performed by: NURSE PRACTITIONER

## 2022-11-29 PROCEDURE — 83735 ASSAY OF MAGNESIUM: CPT | Performed by: INTERNAL MEDICINE

## 2022-11-29 PROCEDURE — 36415 COLL VENOUS BLD VENIPUNCTURE: CPT | Performed by: THORACIC SURGERY (CARDIOTHORACIC VASCULAR SURGERY)

## 2022-11-29 PROCEDURE — 80048 BASIC METABOLIC PNL TOTAL CA: CPT | Mod: XB | Performed by: INTERNAL MEDICINE

## 2022-11-29 PROCEDURE — 25000003 PHARM REV CODE 250: Performed by: INTERNAL MEDICINE

## 2022-11-29 PROCEDURE — 63600175 PHARM REV CODE 636 W HCPCS: Performed by: INTERNAL MEDICINE

## 2022-11-29 PROCEDURE — 84134 ASSAY OF PREALBUMIN: CPT | Performed by: THORACIC SURGERY (CARDIOTHORACIC VASCULAR SURGERY)

## 2022-11-29 RX ORDER — POTASSIUM CHLORIDE 20 MEQ/1
40 TABLET, EXTENDED RELEASE ORAL ONCE
Status: COMPLETED | OUTPATIENT
Start: 2022-11-29 | End: 2022-11-29

## 2022-11-29 RX ORDER — TRAZODONE HYDROCHLORIDE 50 MG/1
50 TABLET ORAL NIGHTLY
Status: DISCONTINUED | OUTPATIENT
Start: 2022-11-29 | End: 2022-12-05 | Stop reason: HOSPADM

## 2022-11-29 RX ORDER — LANOLIN ALCOHOL/MO/W.PET/CERES
400 CREAM (GRAM) TOPICAL ONCE
Status: COMPLETED | OUTPATIENT
Start: 2022-11-29 | End: 2022-11-29

## 2022-11-29 RX ORDER — CHLORHEXIDINE GLUCONATE ORAL RINSE 1.2 MG/ML
10 SOLUTION DENTAL
Status: DISCONTINUED | OUTPATIENT
Start: 2022-11-29 | End: 2022-11-30 | Stop reason: HOSPADM

## 2022-11-29 RX ORDER — HYDROXYZINE PAMOATE 25 MG/1
25 CAPSULE ORAL EVERY 6 HOURS PRN
Status: DISCONTINUED | OUTPATIENT
Start: 2022-11-29 | End: 2022-12-05 | Stop reason: HOSPADM

## 2022-11-29 RX ADMIN — ONDANSETRON 4 MG: 2 INJECTION INTRAMUSCULAR; INTRAVENOUS at 07:11

## 2022-11-29 RX ADMIN — MUPIROCIN: 20 OINTMENT TOPICAL at 08:11

## 2022-11-29 RX ADMIN — PIPERACILLIN SODIUM AND TAZOBACTAM SODIUM 4.5 G: 4; .5 INJECTION, POWDER, LYOPHILIZED, FOR SOLUTION INTRAVENOUS at 05:11

## 2022-11-29 RX ADMIN — HYDROCODONE BITARTRATE AND ACETAMINOPHEN 1 TABLET: 10; 325 TABLET ORAL at 07:11

## 2022-11-29 RX ADMIN — POTASSIUM CHLORIDE 40 MEQ: 1500 TABLET, EXTENDED RELEASE ORAL at 06:11

## 2022-11-29 RX ADMIN — Medication 400 MG: at 06:11

## 2022-11-29 RX ADMIN — HYDROCODONE BITARTRATE AND ACETAMINOPHEN 1 TABLET: 10; 325 TABLET ORAL at 08:11

## 2022-11-29 RX ADMIN — TRAZODONE HYDROCHLORIDE 50 MG: 50 TABLET ORAL at 09:11

## 2022-11-29 RX ADMIN — HYDROCODONE BITARTRATE AND ACETAMINOPHEN 1 TABLET: 10; 325 TABLET ORAL at 02:11

## 2022-11-29 RX ADMIN — SERTRALINE HYDROCHLORIDE 50 MG: 50 TABLET ORAL at 08:11

## 2022-11-29 RX ADMIN — HYDROCODONE BITARTRATE AND ACETAMINOPHEN 1 TABLET: 10; 325 TABLET ORAL at 12:11

## 2022-11-29 RX ADMIN — VANCOMYCIN HYDROCHLORIDE 1500 MG: 1.5 INJECTION, POWDER, LYOPHILIZED, FOR SOLUTION INTRAVENOUS at 12:11

## 2022-11-29 RX ADMIN — PIPERACILLIN SODIUM AND TAZOBACTAM SODIUM 4.5 G: 4; .5 INJECTION, POWDER, LYOPHILIZED, FOR SOLUTION INTRAVENOUS at 02:11

## 2022-11-29 RX ADMIN — ONDANSETRON 4 MG: 2 INJECTION INTRAMUSCULAR; INTRAVENOUS at 09:11

## 2022-11-29 RX ADMIN — PROMETHAZINE HYDROCHLORIDE 12.5 MG: 25 INJECTION INTRAMUSCULAR; INTRAVENOUS at 05:11

## 2022-11-29 RX ADMIN — ALPRAZOLAM 0.25 MG: 0.25 TABLET ORAL at 09:11

## 2022-11-29 RX ADMIN — VANCOMYCIN HYDROCHLORIDE 1500 MG: 1.5 INJECTION, POWDER, LYOPHILIZED, FOR SOLUTION INTRAVENOUS at 11:11

## 2022-11-29 RX ADMIN — PIPERACILLIN SODIUM AND TAZOBACTAM SODIUM 4.5 G: 4; .5 INJECTION, POWDER, LYOPHILIZED, FOR SOLUTION INTRAVENOUS at 10:11

## 2022-11-29 NOTE — HPI
The patient is a 39-year-old male with tobacco abuse who presents with complaints of shortness of breath and left-sided chest pain over the past week.  The patient has cough which is productive of yellowish sputum.  Patient's workup include elevated white blood count at 17.  Patient had a CT scan which showed left lung consolidation and a loculated left pleural effusion.  Thoracentesis done shows the pleural fluid has elevated white blood cell count.

## 2022-11-29 NOTE — PROGRESS NOTES
[facility] ([facility]  Pulmonology  Progress Note    Patient Name: Zane Horne Jr.  MRN: [id number]  Admission Date: 11/28/2022  Hospital Length of Stay: 1 days  Code Status: No Order  Attending Provider: Marco Nguyễn, *  Primary Care Provider: Primary Doctor No   Principal Problem: Sepsis without acute organ dysfunction    Subjective:     Interval History:     ROS complete and negative unless stated in the interval HPI    Objective:     Vital Signs (Most Recent):  Temp: 97.9 °F (36.6 °C) (11/29/22 0701)  Pulse: 94 (11/29/22 0801)  Resp: (!) 33 (11/29/22 0801)  BP: (!) 154/74 (11/29/22 0801)  SpO2: (!) 90 % (11/29/22 0801)   Vital Signs (24h Range):  Temp:  [97.9 °F (36.6 °C)-99.2 °F (37.3 °C)] 97.9 °F (36.6 °C)  Pulse:  [] 94  Resp:  [15-54] 33  SpO2:  [90 %-97 %] 90 %  BP: (124-166)/() 154/74     Weight: 89 kg (196 lb 3.4 oz)  Body mass index is 24.52 kg/m².      Intake/Output Summary (Last 24 hours) at 11/29/2022 1050  Last data filed at 11/29/2022 0600  Gross per 24 hour   Intake 2372.13 ml   Output 400 ml   Net 1972.13 ml       Physical Exam  Constitutional:       General: He is awake. He is not in acute distress.     Appearance: He is well-developed.   HENT:      Mouth/Throat:      Dentition: Abnormal dentition. Dental caries present.   Cardiovascular:      Rate and Rhythm: Normal rate and regular rhythm.      Pulses:           Radial pulses are 2+ on the right side and 2+ on the left side.   Pulmonary:      Effort: Pulmonary effort is normal.      Breath sounds: Examination of the right-middle field reveals decreased breath sounds. Examination of the right-lower field reveals decreased breath sounds. Decreased breath sounds present.      Comments: On RA  Abdominal:      General: There is no distension.      Palpations: Abdomen is soft.   Musculoskeletal:      Right lower leg: No edema.      Left lower leg: No edema.      Comments: MEREDITH   Skin:     General: Skin is warm  and dry.   Neurological:      General: No focal deficit present.      Mental Status: He is alert.   Psychiatric:         Behavior: Behavior is cooperative.       Vents:  Oxygen Concentration (%): 25 (11/29/22 0730)    Lines/Drains/Airways       Peripheral Intravenous Line  Duration                  Peripheral IV - Single Lumen 11/28/22 0259 20 G Anterior;Distal;Left Upper Arm 1 day         Peripheral IV - Single Lumen 11/29/22 0035 20 G Anterior;Right Wrist <1 day                    Significant Labs:    CBC/Anemia Profile:  Recent Labs   Lab 11/28/22 0256 11/29/22 0412   WBC 17.76* 16.95*   HGB 13.7* 11.7*   HCT 39.2* 34.8*    293   MCV 80* 81*   RDW 12.2 12.1        Chemistries:  Recent Labs   Lab 11/28/22 0256 11/29/22 0411    136   K 3.8 3.6    106   CO2 17* 17*   BUN 12 14   CREATININE 0.8 0.8   CALCIUM 9.9 8.9   ALBUMIN 3.1* 2.6*   PROT 8.3 7.3   BILITOT 3.4* 2.4*   ALKPHOS 128 118   ALT 65* 63*   AST 51* 57*       All pertinent labs within the past 24 hours have been reviewed.    Significant Imaging:  I have reviewed all pertinent imaging results/findings within the past 24 hours.      ABG  No results for input(s): PH, PO2, PCO2, HCO3, BE in the last 168 hours.  Assessment/Plan:     * Sepsis without acute organ dysfunction  - see pleural effusion        Loculated Paraneumonic left Pleural effusion  - continue zosyn and vanc, follow cutlures  - many labs from [facility] on 11/28 remain pending, follow  - suspicious for empyema, CT surgery has been consulted for further eval  - obtain MRSA swab   - continue with O2 as needed to maintain sat of 92% or better, currently tolerating RA  - pain meds PRN  - OOB and ambulate as able     Tobacco use  - cessation education  - nicotine patch if needed     Chest pain on breathing  - see pleural effusion       PNA (pneumonia)  - see pleural effusion       Acute respiratory failure with hypoxia  - see pleural effusion            Forest Wan,  NP  Pulmonology  [facility] ([facility])

## 2022-11-29 NOTE — PROGRESS NOTES
[facility] ([facility])  [facility] Medicine  Progress Note    Patient Name: Zane Horne Jr.  MRN: [id number]  Patient Class: IP- Inpatient   Admission Date: 11/28/2022  Length of Stay: 1 days  Attending Physician: Marco Nguyễn, *  Primary Care Provider: Primary Doctor No        Subjective:     Principal Problem:Sepsis without acute organ dysfunction        HPI:     Mr. Horne is a 37 year old male with a history of depression and tobacco use (1 pack per day x 15 years) who presents to the ED with complaints of worsening shortness of breath x 3 days assocaited with productive cough and chest pain, symptoms are constant and moderate in nature, no relieving or exacerbating factors.  Upon arrival in the ED, patient tachypneic, tachycardiac, o2 sats mid 80's on room air.  Placed on 2 liters with noted improvement.  Lab work notable for WBC 17, bili 3.4,ALT 65, AST 51, normal troponin and BNP.  EKG showed ST.  Lactic pending.  Chest xray showed PNA.  Patient was given bolus, blood cultures drawn, started on abx and breathing treatments.  He will be admitted to hospital medicine service for acute hypoxic resp failure/sepsis d/t pna.     At assessment, patient lying in bed, o2 sats stable on 2 liters, complaints of yellow tinged productive cough and chest pain that is worse with movement, cough or deep breath. Of note, earlier last month patient did have a lap cr and has since followed up with his surgeon and has been doing well since that time.     Code Status, Full  Surrogate Decision Maker, wife, Mary      Overview/Hospital Course:  36 y/o WM admitted with a DX of PNA  and  Loculated parapneumonic effusion . Started on IV zosyn and vanc . S/P Thoracentesis  -  250 ccs of dark anibal fluid removed  . Pleural effusion cell diff show a wbc > 30 k .PH and glucose pleural fluid pending .  CTS consulted  due to possible empyema . The blood cx and Pleural effusion  cx are NGTD .       Interval  History:     Review of Systems   Constitutional:  Positive for activity change, chills, fatigue and fever.   HENT: Negative.     Eyes: Negative.    Respiratory:  Positive for chest tightness and shortness of breath.    Cardiovascular: Negative.    Gastrointestinal: Negative.    Endocrine: Negative.    Genitourinary: Negative.    Musculoskeletal: Negative.    Allergic/Immunologic: Negative.    Neurological: Negative.    Hematological: Negative.    Psychiatric/Behavioral: Negative.     Objective:     Vital Signs (Most Recent):  Temp: 97.9 °F (36.6 °C) (11/29/22 0701)  Pulse: 94 (11/29/22 0801)  Resp: (!) 33 (11/29/22 0801)  BP: (!) 154/74 (11/29/22 0801)  SpO2: (!) 90 % (11/29/22 0801)   Vital Signs (24h Range):  Temp:  [97.9 °F (36.6 °C)-99.2 °F (37.3 °C)] 97.9 °F (36.6 °C)  Pulse:  [] 94  Resp:  [15-54] 33  SpO2:  [90 %-97 %] 90 %  BP: (124-166)/() 154/74     Weight: 89 kg (196 lb 3.4 oz)  Body mass index is 24.52 kg/m².    Intake/Output Summary (Last 24 hours) at 11/29/2022 1014  Last data filed at 11/29/2022 0600  Gross per 24 hour   Intake 2372.13 ml   Output 400 ml   Net 1972.13 ml      Physical Exam  Vitals reviewed.   Constitutional:       Appearance: Normal appearance. He is ill-appearing.   HENT:      Head: Normocephalic and atraumatic.      Nose: Nose normal.      Mouth/Throat:      Mouth: Mucous membranes are moist.   Eyes:      Extraocular Movements: Extraocular movements intact.      Conjunctiva/sclera: Conjunctivae normal.      Pupils: Pupils are equal, round, and reactive to light.   Cardiovascular:      Rate and Rhythm: Normal rate and regular rhythm.      Pulses: Normal pulses.   Pulmonary:      Effort: Pulmonary effort is normal.      Breath sounds: Examination of the left-middle field reveals decreased breath sounds. Examination of the left-lower field reveals decreased breath sounds. Decreased breath sounds and rhonchi present.   Abdominal:      General: Abdomen is flat. Bowel sounds  are normal. There is no distension.      Palpations: There is no mass.      Tenderness: There is no abdominal tenderness. There is no guarding or rebound.      Hernia: No hernia is present.   Musculoskeletal:         General: No swelling, tenderness, deformity or signs of injury. Normal range of motion.      Cervical back: Normal range of motion. No rigidity or tenderness.   Skin:     General: Skin is warm.      Coloration: Skin is not jaundiced or pale.      Findings: No bruising or erythema.   Neurological:      General: No focal deficit present.      Mental Status: He is alert and oriented to person, place, and time. Mental status is at baseline.      Cranial Nerves: No cranial nerve deficit.      Sensory: No sensory deficit.      Motor: No weakness.   Psychiatric:         Mood and Affect: Mood normal.       Significant Labs: All pertinent labs within the past 24 hours have been reviewed.    Results for orders placed or performed during the hospital encounter of 11/28/22   Influenza A & B by Molecular    Specimen: Nasopharyngeal Swab   Result Value Ref Range    Influenza A, Molecular Negative Negative    Influenza B, Molecular Negative Negative    Flu A & B Source NP    Blood culture #1 **CANNOT BE ORDERED STAT**    Specimen: Peripheral, Antecubital, Left; Blood   Result Value Ref Range    Blood Culture, Routine No Growth to date     Blood Culture, Routine No Growth to date    Blood culture #2 **CANNOT BE ORDERED STAT**    Specimen: Peripheral, Antecubital, Right; Blood   Result Value Ref Range    Blood Culture, Routine No Growth to date     Blood Culture, Routine No Growth to date    Culture, Body Fluid (Aerobic) w/ GS    Specimen: Pleural Fluid   Result Value Ref Range    Gram Stain Result Rare WBC's     Gram Stain Result No organisms seen    KOH prep    Specimen: Pleural Fluid   Result Value Ref Range    KOH Prep No yeast or fungal elements seen    CBC auto differential   Result Value Ref Range    WBC 17.76  (H) 3.90 - 12.70 K/uL    RBC 4.93 4.60 - 6.20 M/uL    Hemoglobin 13.7 (L) 14.0 - 18.0 g/dL    Hematocrit 39.2 (L) 40.0 - 54.0 %    MCV 80 (L) 82 - 98 fL    MCH 27.8 27.0 - 31.0 pg    MCHC 34.9 32.0 - 36.0 g/dL    RDW 12.2 11.5 - 14.5 %    Platelets 287 150 - 450 K/uL    MPV 9.5 9.2 - 12.9 fL    Immature Granulocytes 0.4 0.0 - 0.5 %    Gran # (ANC) 15.7 (H) 1.8 - 7.7 K/uL    Immature Grans (Abs) 0.07 (H) 0.00 - 0.04 K/uL    Lymph # 0.7 (L) 1.0 - 4.8 K/uL    Mono # 1.2 (H) 0.3 - 1.0 K/uL    Eos # 0.0 0.0 - 0.5 K/uL    Baso # 0.03 0.00 - 0.20 K/uL    nRBC 0 0 /100 WBC    Gran % 88.3 (H) 38.0 - 73.0 %    Lymph % 4.1 (L) 18.0 - 48.0 %    Mono % 6.9 4.0 - 15.0 %    Eosinophil % 0.1 0.0 - 8.0 %    Basophil % 0.2 0.0 - 1.9 %    Differential Method Automated    Comprehensive metabolic panel   Result Value Ref Range    Sodium 136 136 - 145 mmol/L    Potassium 3.8 3.5 - 5.1 mmol/L    Chloride 104 95 - 110 mmol/L    CO2 17 (L) 23 - 29 mmol/L    Glucose 145 (H) 70 - 110 mg/dL    BUN 12 6 - 20 mg/dL    Creatinine 0.8 0.5 - 1.4 mg/dL    Calcium 9.9 8.7 - 10.5 mg/dL    Total Protein 8.3 6.0 - 8.4 g/dL    Albumin 3.1 (L) 3.5 - 5.2 g/dL    Total Bilirubin 3.4 (H) 0.1 - 1.0 mg/dL    Alkaline Phosphatase 128 55 - 135 U/L    AST 51 (H) 10 - 40 U/L    ALT 65 (H) 10 - 44 U/L    Anion Gap 15 8 - 16 mmol/L    eGFR >60 >60 mL/min/1.73 m^2   Brain natriuretic peptide   Result Value Ref Range    BNP <10 0 - 99 pg/mL   Troponin I   Result Value Ref Range    Troponin I <0.006 0.000 - 0.026 ng/mL   COVID-19 Rapid Screening   Result Value Ref Range    SARS-CoV-2 RNA, Amplification, Qual Negative Negative   Lactic acid, plasma   Result Value Ref Range    Lactate (Lactic Acid) 1.3 0.5 - 2.2 mmol/L   D dimer, quantitative   Result Value Ref Range    D-Dimer 2.68 (H) <0.50 mg/L FEU   Urinalysis, Reflex to Urine Culture Urine, Clean Catch    Specimen: Urine   Result Value Ref Range    Specimen UA Urine, Clean Catch     Color, UA Orange (A) Yellow,  Straw, Christina    Appearance, UA Clear Clear    pH, UA 7.0 5.0 - 8.0    Specific Gravity, UA >1.030 (A) 1.005 - 1.030    Protein, UA 2+ (A) Negative    Glucose, UA Trace (A) Negative    Ketones, UA Negative Negative    Bilirubin (UA) 2+ (A) Negative    Occult Blood UA Negative Negative    Nitrite, UA Negative Negative    Urobilinogen, UA >=8.0 (A) <2.0 EU/dL    Leukocytes, UA Negative Negative   Urinalysis Microscopic   Result Value Ref Range    RBC, UA 2 0 - 4 /hpf    WBC, UA 2 0 - 5 /hpf    WBC Clumps, UA Rare None-Rare    Bacteria None None-Occ /hpf    Hyaline Casts, UA 0 0-1/lpf /lpf    Microscopic Comment SEE COMMENT    WBC & Diff,Body Fluid Pleural Fluid, Left   Result Value Ref Range    Body Fluid Type Pleural Fluid, Left     Fluid Appearance Turbid     Fluid Color Christina     WBC, Body Fluid 24401 /cu mm    Segs, Fluid 30 %    Lymphs, Fluid 2 %    Monocytes/Macrophages, Fluid 9 %    Eos, Fluid 59 %   Comprehensive metabolic panel   Result Value Ref Range    Sodium 136 136 - 145 mmol/L    Potassium 3.6 3.5 - 5.1 mmol/L    Chloride 106 95 - 110 mmol/L    CO2 17 (L) 23 - 29 mmol/L    Glucose 126 (H) 70 - 110 mg/dL    BUN 14 6 - 20 mg/dL    Creatinine 0.8 0.5 - 1.4 mg/dL    Calcium 8.9 8.7 - 10.5 mg/dL    Total Protein 7.3 6.0 - 8.4 g/dL    Albumin 2.6 (L) 3.5 - 5.2 g/dL    Total Bilirubin 2.4 (H) 0.1 - 1.0 mg/dL    Alkaline Phosphatase 118 55 - 135 U/L    AST 57 (H) 10 - 40 U/L    ALT 63 (H) 10 - 44 U/L    Anion Gap 13 8 - 16 mmol/L    eGFR >60 >60 mL/min/1.73 m^2   CBC auto differential   Result Value Ref Range    WBC 16.95 (H) 3.90 - 12.70 K/uL    RBC 4.31 (L) 4.60 - 6.20 M/uL    Hemoglobin 11.7 (L) 14.0 - 18.0 g/dL    Hematocrit 34.8 (L) 40.0 - 54.0 %    MCV 81 (L) 82 - 98 fL    MCH 27.1 27.0 - 31.0 pg    MCHC 33.6 32.0 - 36.0 g/dL    RDW 12.1 11.5 - 14.5 %    Platelets 293 150 - 450 K/uL    MPV 10.3 9.2 - 12.9 fL    Immature Granulocytes 0.8 (H) 0.0 - 0.5 %    Gran # (ANC) 14.3 (H) 1.8 - 7.7 K/uL    Immature  Grans (Abs) 0.13 (H) 0.00 - 0.04 K/uL    Lymph # 1.4 1.0 - 4.8 K/uL    Mono # 1.1 (H) 0.3 - 1.0 K/uL    Eos # 0.0 0.0 - 0.5 K/uL    Baso # 0.03 0.00 - 0.20 K/uL    nRBC 0 0 /100 WBC    Gran % 84.4 (H) 38.0 - 73.0 %    Lymph % 8.0 (L) 18.0 - 48.0 %    Mono % 6.5 4.0 - 15.0 %    Eosinophil % 0.1 0.0 - 8.0 %    Basophil % 0.2 0.0 - 1.9 %    Differential Method Automated          Significant Imaging: I have reviewed all pertinent imaging results/findings within the past 24 hours.    X-Ray Chest AP Portable   Final Result      In comparison to the prior study, there is no adverse interval changes         Electronically signed by: Zane Kingston MD   Date:    11/28/2022   Time:    12:46      US Thoracentesis with Imaging, Aspiration Only   Final Result      Left-sided thoracentesis without evidence of immediate complication.         Electronically signed by: Zane Kingston MD   Date:    11/28/2022   Time:    12:45      US Abdomen Limited   Final Result      No acute abnormalities         Electronically signed by: Zane Kingston MD   Date:    11/28/2022   Time:    12:44      US Lower Extremity Veins Bilateral   Final Result      No evidence of deep venous thrombosis in either lower extremity.         Electronically signed by: Garrett Rodríguez   Date:    11/28/2022   Time:    09:40      CTA Chest Non-Coronary (PE Studies)   Final Result      No evidence of pulmonary embolism.  Limited bolus tracking.      Dense consolidation seen throughout bilateral lower lobes.   Multiloculated left pleural effusion which is small to moderate.      See additional findings above      All CT scans at this facility use dose modulation, iterative reconstruction and/or weight based dosing when appropriate to reduce radiation dose to as low as reasonably achievable.         Electronically signed by: Zane Kingston MD   Date:    11/28/2022   Time:    09:02      X-ray Chest PA And Lateral   Final Result      Small to moderate left-sided pleural effusion  with left basilar atelectasis or airspace disease.         Electronically signed by: Zane Kingston MD   Date:    11/28/2022   Time:    08:05      X-Ray Chest AP Portable   ED Interpretation   Left lower lobe infiltrate      Final Result      Small to moderate left-sided pleural effusion with left basilar atelectasis or airspace disease.         Electronically signed by: Zane Kingston MD   Date:    11/28/2022   Time:    07:58              Assessment/Plan:      * Sepsis without acute organ dysfunction  2/2 to PNA and possible empyema        Loculated Paraneumonic left Pleural effusion  -S/P Thoracentesis which sow a > 30 K wbc   - Highly suspicious of empyema   -Cont IVAB  -F/U cx   -CTS consulted       Tobacco use  -1 pack per day x 15 years approx  -decline nicotine patch  counseled on cessation      Chest pain on breathing  - likely pleuritic in nature, worse with cough/deep breath  - tend troponin, have been negative thus far  - po pain medication prn      PNA (pneumonia)  - IV abx   -Loculated left pleural effusion       Acute respiratory failure with hypoxia  Patient with Hypoxic Respiratory failure which is Acute.  he is not on home oxygen. Supplemental oxygen was provided and noted- Oxygen Concentration (%):  [25-35] 25.   Signs/symptoms of respiratory failure include- tachypnea and increased work of breathing. Contributing diagnoses includes - Pneumonia Labs and images were reviewed. Patient Has not had a recent ABG. Will treat underlying causes and adjust management of respiratory failure as follows.  - continue iv abx for pna  - duo nebs as needed  - wean o2 to keep sats > 90%        VTE Risk Mitigation (From admission, onward)           Ordered     Place sequential compression device  Until discontinued         11/28/22 1754                    Discharge Planning   IVY:      Code Status: Not on file   Is the patient medically ready for discharge?:     Reason for patient still in hospital (select all that  apply): Treatment                   Marco Nguyễn MD  Department of Hospital Medicine   [facility] ([facility])

## 2022-11-29 NOTE — PLAN OF CARE
[facility] (Hospital)  Initial Discharge Assessment    Anticipated DC dispo: Home with family  Prior Level of Function: Independent with ADLs, no DME, currently works and drives  PCP: Spouse uncertain of PCP but pt is establish with a provider at/near [facility].      Comments: CM needs to be determined by hospital progress. SW to continue following.     Primary Care Provider: Primary Doctor No    Admission Diagnosis: Dyspnea [R06.00]  Acute respiratory failure with hypoxia [J96.01]  Pneumonia of left lower lobe due to infectious organism [J18.9]    Admission Date: 11/28/2022  Expected Discharge Date:          Payor: MEDICAID / Plan: [facility] COMMUNITY PLAN [facility] ([place] MEDICAID) / Product Type: Managed Medicaid /     Extended Emergency Contact Information  Primary Emergency Contact: Mary Horne  Mobile Phone: [phone]  Relation: Spouse  Preferred language: English   needed? No    Discharge Plan A: Home, Home with family       No Pharmacies Listed    Initial Assessment (most recent)       Adult Discharge Assessment - 11/29/22 1353          Discharge Assessment    Assessment Type Discharge Planning Assessment     Confirmed/corrected address, phone number and insurance Yes     Confirmed Demographics Correct on Facesheet     Source of Information family     Communicated IVY with patient/caregiver Date not available/Unable to determine     Reason For Admission Sepsis w/o acute organ dysfunction     Lives With spouse;child(sara), dependent     Facility Arrived From: Home     Do you expect to return to your current living situation? Yes     Do you have help at home or someone to help you manage your care at home? Yes     Who are your caregiver(s) and their phone number(s)? Pt's spouse Mary     Prior to hospitilization cognitive status: Alert/Oriented     Current cognitive status: Alert/Oriented     Walking or Climbing Stairs Difficulty none     Dressing/Bathing Difficulty none      Equipment Currently Used at Home none     Readmission within 30 days? No     Patient currently being followed by outpatient case management? No     Do you currently have service(s) that help you manage your care at home? No     Do you have prescription coverage? Yes     Do you have any problems affording any of your prescribed medications? No     Is the patient taking medications as prescribed? yes     Who is going to help you get home at discharge? Pt's spouse     How do you get to doctors appointments? family or friend will provide;car, drives self     Are you on dialysis? No     Do you take coumadin? No     Discharge Plan A Home;Home with family     DME Needed Upon Discharge  none     Discharge Plan discussed with: Spouse/sig other

## 2022-11-29 NOTE — ASSESSMENT & PLAN NOTE
Patient with Hypoxic Respiratory failure which is Acute.  he is not on home oxygen. Supplemental oxygen was provided and noted- Oxygen Concentration (%):  [25-35] 25.   Signs/symptoms of respiratory failure include- tachypnea and increased work of breathing. Contributing diagnoses includes - Pneumonia Labs and images were reviewed. Patient Has not had a recent ABG. Will treat underlying causes and adjust management of respiratory failure as follows.  - continue iv abx for pna  - duo nebs as needed  - wean o2 to keep sats > 90%

## 2022-11-29 NOTE — HOSPITAL COURSE
38 y/o WM admitted with a DX of PNA  and  Loculated parapneumonic effusion . Started on IV zosyn and vanc . S/P Thoracentesis  -  250 ccs of dark anibal fluid removed  . Pleural effusion cell diff show a wbc > 30 k .PH and glucose pleural fluid pending .  CTS consulted  and performed a VATS on 11/30 ( Multiple loculated pockets of purulent fluid with fibrinous purulent adhesions.  Well developed rind over the left lower lobe) . The blood cx and Pleural effusion  cx are NGTD .     12/5  Chest tube removed per CTS. After discussing with CTS, ok to discharge home with po antibiotic(s). Microbiology negative growth to date and others pending. Patient advised to follow up with cts as planned.     Patient seen and evaluated by me. Patient was determined to be suitable for d/c. Patient deemed stable for discharge to home with nurse practitioner to visit.

## 2022-11-29 NOTE — H&P (VIEW-ONLY)
[facility] - [facility]ensive Care ([facility])  Cardiothoracic Surgery  Consult Note    Patient Name: Zane Horne Jr.  MRN: [id number]  Admission Date: 11/28/2022  Attending Physician: Marco Nguyễn, *  Referring Provider: Self, Aaareferral    Patient information was obtained from patient and ER records.     Consults  Subjective:     Principal Problem: Sepsis without acute organ dysfunction    History of Present Illness: The patient is a 39-year-old male with tobacco abuse, depression and status post recent cholecystectomy about a month ago who presents with complaints of shortness of breath and left-sided chest pain over the past week.  The patient has cough which is productive of yellowish sputum.  Patient's workup include elevated white blood count at 17.  Patient had a CT scan which showed left lung consolidation and a loculated left pleural effusion.  Thoracentesis done shows the pleural fluid has elevated white blood cell count.      No current facility-administered medications on file prior to encounter.     Current Outpatient Medications on File Prior to Encounter   Medication Sig    diclofenac (VOLTAREN) 50 MG EC tablet Take 1 tablet (50 mg total) by mouth 3 (three) times daily as needed.    sertraline (ZOLOFT) 50 MG tablet Take 50 mg by mouth once daily.       Review of patient's allergies indicates:   Allergen Reactions    Ultram [tramadol] Rash       Past Medical History:   Diagnosis Date    Back pain     Bulging disc     L5     History reviewed. No pertinent surgical history.  Family History    None       Tobacco Use    Smoking status: Every Day    Smokeless tobacco: Not on file   Substance and Sexual Activity    Alcohol use: No    Drug use: Not on file    Sexual activity: Not on file     Review of Systems   Constitutional:  Positive for activity change.   HENT: Negative.     Eyes: Negative.    Respiratory:  Positive for cough and shortness of breath.    Cardiovascular:  Positive for chest  pain.   Gastrointestinal: Negative.    Endocrine: Negative.    Genitourinary: Negative.    Musculoskeletal:  Positive for back pain.   Skin: Negative.    Allergic/Immunologic: Negative.    Neurological: Negative.    Hematological: Negative.    Psychiatric/Behavioral:  Positive for dysphoric mood. The patient is nervous/anxious.    Objective:     Vital Signs (Most Recent):  Temp: 97.9 °F (36.6 °C) (11/29/22 0701)  Pulse: 94 (11/29/22 0801)  Resp: (!) 33 (11/29/22 0801)  BP: (!) 154/74 (11/29/22 0801)  SpO2: (!) 90 % (11/29/22 0801)   Vital Signs (24h Range):  Temp:  [97.9 °F (36.6 °C)-99.2 °F (37.3 °C)] 97.9 °F (36.6 °C)  Pulse:  [] 94  Resp:  [15-54] 33  SpO2:  [90 %-97 %] 90 %  BP: (124-166)/() 154/74     Weight: 89 kg (196 lb 3.4 oz)  Body mass index is 24.52 kg/m².    SpO2: (!) 90 %  O2 Device (Oxygen Therapy): venti mask (found on venti mask)     Intake/Output - Last 3 Shifts         11/27 0700 11/28 0659 11/28 0700 11/29 0659 11/29 0700 11/30 0659    I.V. (mL/kg)  1653.2 (18.6)     IV Piggyback 301.6 718.9     Total Intake(mL/kg) 301.6 (3.5) 2372.1 (26.7)     Urine (mL/kg/hr)  400 (0.2)     Stool  0     Total Output  400     Net +301.6 +1972.1            Stool Occurrence  1 x              Lines/Drains/Airways       Peripheral Intravenous Line  Duration                  Peripheral IV - Single Lumen 11/28/22 0259 20 G Anterior;Distal;Left Upper Arm 1 day         Peripheral IV - Single Lumen 11/29/22 0035 20 G Anterior;Right Wrist <1 day                     STS Risk Score: n/a    Physical Exam  Constitutional:       Appearance: Normal appearance.   HENT:      Head: Normocephalic and atraumatic.      Mouth/Throat:      Mouth: Mucous membranes are moist.   Eyes:      Extraocular Movements: Extraocular movements intact.      Pupils: Pupils are equal, round, and reactive to light.   Cardiovascular:      Rate and Rhythm: Tachycardia present.      Pulses: Normal pulses.      Heart sounds: Normal heart  sounds.   Pulmonary:      Effort: Pulmonary effort is normal.   Abdominal:      General: Abdomen is flat. Bowel sounds are normal.      Palpations: Abdomen is soft.   Musculoskeletal:      Right lower leg: No edema.      Left lower leg: No edema.   Skin:     General: Skin is warm and dry.   Neurological:      Mental Status: He is alert and oriented to person, place, and time.   Psychiatric:         Behavior: Behavior normal.       Significant Labs:  All pertinent labs from the last 24 hours have been reviewed.    Significant Diagnostics:  I have reviewed all pertinent imaging results/findings within the past 24 hours.      Assessment/Plan:     NYHA Score: NYHA III: marked limitation of physical activity, comfortable at rest     Loculated Paraneumonic left Pleural effusion  The patient is a 37-year-old male with history of tobacco abuse who presented with shortness of breath.  CT scan shows left lung consolidation and a loculated parapneumonic collection.  Thoracentesis shows that the pleural fluid has a turbid appearance and has a markedly elevated WBC at 31605.cu mm.    The the findings of  loculated pleural effusion on CT, in the setting of consolidated left lower lobe, with an elevated WBC in a turbid appearing fluid is compatible with a parapneumonic effusion most likely an empyema.  The patient is a candidate for a VATS procedure with evacuation of empyema.  The risks and benefits of the procedure was explained to the patient.  The patient understands the risks and benefits of surgery and has agreed to proceed with VATS with evacuation of empyema for 11/30/2022.        Thank you for your consult. I will follow-up with patient. Please contact us if you have any additional questions.    Matt Martinez MD  Cardiothoracic Surgery  [facility] - Intensive Care hospitals)

## 2022-11-29 NOTE — PLAN OF CARE
Patient AAO x 4. Course lung sounds, fluids stopped. Patient has dyspnea on exertion. Normal sinus/sinus tach on telemetry. Normotensive. Phenergan and zofran given for nausea. Safety measures in place. Call light within reach.

## 2022-11-29 NOTE — ASSESSMENT & PLAN NOTE
- continue zosyn and vanc, follow cutlures  - many labs from [facility] on 11/28 remain pending, follow  - suspicious for empyema, CT surgery has been consulted for further eval  - obtain MRSA swab   - continue with O2 as needed to maintain sat of 92% or better, currently tolerating RA  - pain meds PRN  - OOB and ambulate as able

## 2022-11-29 NOTE — ASSESSMENT & PLAN NOTE
The patient is a 37-year-old male with history of tobacco abuse who presented with shortness of breath.  CT scan shows left lung consolidation and a loculated parapneumonic collection.  Thoracentesis shows that the pleural fluid has a turbid appearance and has a markedly elevated WBC at 89871.cu mm.    The the findings of  loculated pleural effusion on CT, in the setting of consolidated left lower lobe, with an elevated WBC in turbid appearing fluid is compatible with a parapneumonic effusion most likely an empyema.  The patient is a candidate for a VATS procedure with evacuation of empyema.  The risks and benefits of the procedure was explained to the patient.  The patient understands the risks and benefits of surgery and has agreed to proceed with VATS with evacuation of empyema for 11/30/2022.

## 2022-11-29 NOTE — HOSPITAL COURSE
Pt underwent L VATS yesterday, this AM on RA and VSS, c/o pain and L CT site but otherwise without issues and progressing well

## 2022-11-29 NOTE — ASSESSMENT & PLAN NOTE
-S/P Thoracentesis which sow a > 30 l wbc   - Highly suspicious of empyema   -Cont IVAB  -F/U cx   -CTS consulted

## 2022-11-29 NOTE — SUBJECTIVE & OBJECTIVE
Interval History:     ROS complete and negative unless stated in the interval HPI    Objective:     Vital Signs (Most Recent):  Temp: 97.9 °F (36.6 °C) (11/29/22 0701)  Pulse: 94 (11/29/22 0801)  Resp: (!) 33 (11/29/22 0801)  BP: (!) 154/74 (11/29/22 0801)  SpO2: (!) 90 % (11/29/22 0801)   Vital Signs (24h Range):  Temp:  [97.9 °F (36.6 °C)-99.2 °F (37.3 °C)] 97.9 °F (36.6 °C)  Pulse:  [] 94  Resp:  [15-54] 33  SpO2:  [90 %-97 %] 90 %  BP: (124-166)/() 154/74     Weight: 89 kg (196 lb 3.4 oz)  Body mass index is 24.52 kg/m².      Intake/Output Summary (Last 24 hours) at 11/29/2022 1050  Last data filed at 11/29/2022 0600  Gross per 24 hour   Intake 2372.13 ml   Output 400 ml   Net 1972.13 ml       Physical Exam  Constitutional:       General: He is awake. He is not in acute distress.     Appearance: He is well-developed.   HENT:      Mouth/Throat:      Dentition: Abnormal dentition. Dental caries present.   Cardiovascular:      Rate and Rhythm: Normal rate and regular rhythm.      Pulses:           Radial pulses are 2+ on the right side and 2+ on the left side.   Pulmonary:      Effort: Pulmonary effort is normal.      Breath sounds: Examination of the right-middle field reveals decreased breath sounds. Examination of the right-lower field reveals decreased breath sounds. Decreased breath sounds present.      Comments: On RA  Abdominal:      General: There is no distension.      Palpations: Abdomen is soft.   Musculoskeletal:      Right lower leg: No edema.      Left lower leg: No edema.      Comments: MEREDITH   Skin:     General: Skin is warm and dry.   Neurological:      General: No focal deficit present.      Mental Status: He is alert.   Psychiatric:         Behavior: Behavior is cooperative.       Vents:  Oxygen Concentration (%): 25 (11/29/22 0730)    Lines/Drains/Airways       Peripheral Intravenous Line  Duration                  Peripheral IV - Single Lumen 11/28/22 0259 20 G Anterior;Distal;Left  Upper Arm 1 day         Peripheral IV - Single Lumen 11/29/22 0035 20 G Anterior;Right Wrist <1 day                    Significant Labs:    CBC/Anemia Profile:  Recent Labs   Lab 11/28/22 0256 11/29/22 0412   WBC 17.76* 16.95*   HGB 13.7* 11.7*   HCT 39.2* 34.8*    293   MCV 80* 81*   RDW 12.2 12.1        Chemistries:  Recent Labs   Lab 11/28/22 0256 11/29/22 0411    136   K 3.8 3.6    106   CO2 17* 17*   BUN 12 14   CREATININE 0.8 0.8   CALCIUM 9.9 8.9   ALBUMIN 3.1* 2.6*   PROT 8.3 7.3   BILITOT 3.4* 2.4*   ALKPHOS 128 118   ALT 65* 63*   AST 51* 57*       All pertinent labs within the past 24 hours have been reviewed.    Significant Imaging:  I have reviewed all pertinent imaging results/findings within the past 24 hours.

## 2022-11-29 NOTE — CONSULTS
[facility] ([facility])  Cardiothoracic Surgery  Consult Note    Patient Name: Zane Horne Jr.  MRN: [id number]  Admission Date: 11/28/2022  Attending Physician: Marco Nguyễn, *  Referring Provider: Self, Aaareferral    Patient information was obtained from patient and ER records.     Consults  Subjective:     Principal Problem: Sepsis without acute organ dysfunction    History of Present Illness: The patient is a 39-year-old male with tobacco abuse, depression and status post recent cholecystectomy about a month ago who presents with complaints of shortness of breath and left-sided chest pain over the past week.  The patient has cough which is productive of yellowish sputum.  Patient's workup include elevated white blood count at 17.  Patient had a CT scan which showed left lung consolidation and a loculated left pleural effusion.  Thoracentesis done shows the pleural fluid has elevated white blood cell count.      No current facility-administered medications on file prior to encounter.     Current Outpatient Medications on File Prior to Encounter   Medication Sig    diclofenac (VOLTAREN) 50 MG EC tablet Take 1 tablet (50 mg total) by mouth 3 (three) times daily as needed.    sertraline (ZOLOFT) 50 MG tablet Take 50 mg by mouth once daily.       Review of patient's allergies indicates:   Allergen Reactions    Ultram [tramadol] Rash       Past Medical History:   Diagnosis Date    Back pain     Bulging disc     L5     History reviewed. No pertinent surgical history.  Family History    None       Tobacco Use    Smoking status: Every Day    Smokeless tobacco: Not on file   Substance and Sexual Activity    Alcohol use: No    Drug use: Not on file    Sexual activity: Not on file     Review of Systems   Constitutional:  Positive for activity change.   HENT: Negative.     Eyes: Negative.    Respiratory:  Positive for cough and shortness of breath.    Cardiovascular:  Positive for chest  pain.   Gastrointestinal: Negative.    Endocrine: Negative.    Genitourinary: Negative.    Musculoskeletal:  Positive for back pain.   Skin: Negative.    Allergic/Immunologic: Negative.    Neurological: Negative.    Hematological: Negative.    Psychiatric/Behavioral:  Positive for dysphoric mood. The patient is nervous/anxious.    Objective:     Vital Signs (Most Recent):  Temp: 97.9 °F (36.6 °C) (11/29/22 0701)  Pulse: 94 (11/29/22 0801)  Resp: (!) 33 (11/29/22 0801)  BP: (!) 154/74 (11/29/22 0801)  SpO2: (!) 90 % (11/29/22 0801)   Vital Signs (24h Range):  Temp:  [97.9 °F (36.6 °C)-99.2 °F (37.3 °C)] 97.9 °F (36.6 °C)  Pulse:  [] 94  Resp:  [15-54] 33  SpO2:  [90 %-97 %] 90 %  BP: (124-166)/() 154/74     Weight: 89 kg (196 lb 3.4 oz)  Body mass index is 24.52 kg/m².    SpO2: (!) 90 %  O2 Device (Oxygen Therapy): venti mask (found on venti mask)     Intake/Output - Last 3 Shifts         11/27 0700 11/28 0659 11/28 0700 11/29 0659 11/29 0700 11/30 0659    I.V. (mL/kg)  1653.2 (18.6)     IV Piggyback 301.6 718.9     Total Intake(mL/kg) 301.6 (3.5) 2372.1 (26.7)     Urine (mL/kg/hr)  400 (0.2)     Stool  0     Total Output  400     Net +301.6 +1972.1            Stool Occurrence  1 x              Lines/Drains/Airways       Peripheral Intravenous Line  Duration                  Peripheral IV - Single Lumen 11/28/22 0259 20 G Anterior;Distal;Left Upper Arm 1 day         Peripheral IV - Single Lumen 11/29/22 0035 20 G Anterior;Right Wrist <1 day                     STS Risk Score: n/a    Physical Exam  Constitutional:       Appearance: Normal appearance.   HENT:      Head: Normocephalic and atraumatic.      Mouth/Throat:      Mouth: Mucous membranes are moist.   Eyes:      Extraocular Movements: Extraocular movements intact.      Pupils: Pupils are equal, round, and reactive to light.   Cardiovascular:      Rate and Rhythm: Tachycardia present.      Pulses: Normal pulses.      Heart sounds: Normal heart  sounds.   Pulmonary:      Effort: Pulmonary effort is normal.   Abdominal:      General: Abdomen is flat. Bowel sounds are normal.      Palpations: Abdomen is soft.   Musculoskeletal:      Right lower leg: No edema.      Left lower leg: No edema.   Skin:     General: Skin is warm and dry.   Neurological:      Mental Status: He is alert and oriented to person, place, and time.   Psychiatric:         Behavior: Behavior normal.       Significant Labs:  All pertinent labs from the last 24 hours have been reviewed.    Significant Diagnostics:  I have reviewed all pertinent imaging results/findings within the past 24 hours.      Assessment/Plan:     NYHA Score: NYHA III: marked limitation of physical activity, comfortable at rest     Loculated Paraneumonic left Pleural effusion  The patient is a 37-year-old male with history of tobacco abuse who presented with shortness of breath.  CT scan shows left lung consolidation and a loculated parapneumonic collection.  Thoracentesis shows that the pleural fluid has a turbid appearance and has a markedly elevated WBC at 76075.cu mm.    The the findings of  loculated pleural effusion on CT, in the setting of consolidated left lower lobe, with an elevated WBC in a turbid appearing fluid is compatible with a parapneumonic effusion most likely an empyema.  The patient is a candidate for a VATS procedure with evacuation of empyema.  The risks and benefits of the procedure was explained to the patient.  The patient understands the risks and benefits of surgery and has agreed to proceed with VATS with evacuation of empyema for 11/30/2022.        Thank you for your consult. I will follow-up with patient. Please contact us if you have any additional questions.    Matt Martinez MD  Cardiothoracic Surgery  [facility] - Intensive Care [facility])

## 2022-11-29 NOTE — SUBJECTIVE & OBJECTIVE
Interval History:     Review of Systems   Constitutional:  Positive for activity change, chills, fatigue and fever.   HENT: Negative.     Eyes: Negative.    Respiratory:  Positive for chest tightness and shortness of breath.    Cardiovascular: Negative.    Gastrointestinal: Negative.    Endocrine: Negative.    Genitourinary: Negative.    Musculoskeletal: Negative.    Allergic/Immunologic: Negative.    Neurological: Negative.    Hematological: Negative.    Psychiatric/Behavioral: Negative.     Objective:     Vital Signs (Most Recent):  Temp: 97.9 °F (36.6 °C) (11/29/22 0701)  Pulse: 94 (11/29/22 0801)  Resp: (!) 33 (11/29/22 0801)  BP: (!) 154/74 (11/29/22 0801)  SpO2: (!) 90 % (11/29/22 0801)   Vital Signs (24h Range):  Temp:  [97.9 °F (36.6 °C)-99.2 °F (37.3 °C)] 97.9 °F (36.6 °C)  Pulse:  [] 94  Resp:  [15-54] 33  SpO2:  [90 %-97 %] 90 %  BP: (124-166)/() 154/74     Weight: 89 kg (196 lb 3.4 oz)  Body mass index is 24.52 kg/m².    Intake/Output Summary (Last 24 hours) at 11/29/2022 1014  Last data filed at 11/29/2022 0600  Gross per 24 hour   Intake 2372.13 ml   Output 400 ml   Net 1972.13 ml      Physical Exam  Vitals reviewed.   Constitutional:       Appearance: Normal appearance. He is ill-appearing.   HENT:      Head: Normocephalic and atraumatic.      Nose: Nose normal.      Mouth/Throat:      Mouth: Mucous membranes are moist.   Eyes:      Extraocular Movements: Extraocular movements intact.      Conjunctiva/sclera: Conjunctivae normal.      Pupils: Pupils are equal, round, and reactive to light.   Cardiovascular:      Rate and Rhythm: Normal rate and regular rhythm.      Pulses: Normal pulses.   Pulmonary:      Effort: Pulmonary effort is normal.      Breath sounds: Examination of the left-middle field reveals decreased breath sounds. Examination of the left-lower field reveals decreased breath sounds. Decreased breath sounds and rhonchi present.   Abdominal:      General: Abdomen is flat. Bowel  sounds are normal. There is no distension.      Palpations: There is no mass.      Tenderness: There is no abdominal tenderness. There is no guarding or rebound.      Hernia: No hernia is present.   Musculoskeletal:         General: No swelling, tenderness, deformity or signs of injury. Normal range of motion.      Cervical back: Normal range of motion. No rigidity or tenderness.   Skin:     General: Skin is warm.      Coloration: Skin is not jaundiced or pale.      Findings: No bruising or erythema.   Neurological:      General: No focal deficit present.      Mental Status: He is alert and oriented to person, place, and time. Mental status is at baseline.      Cranial Nerves: No cranial nerve deficit.      Sensory: No sensory deficit.      Motor: No weakness.   Psychiatric:         Mood and Affect: Mood normal.       Significant Labs: All pertinent labs within the past 24 hours have been reviewed.    Results for orders placed or performed during the hospital encounter of 11/28/22   Influenza A & B by Molecular    Specimen: Nasopharyngeal Swab   Result Value Ref Range    Influenza A, Molecular Negative Negative    Influenza B, Molecular Negative Negative    Flu A & B Source NP    Blood culture #1 **CANNOT BE ORDERED STAT**    Specimen: Peripheral, Antecubital, Left; Blood   Result Value Ref Range    Blood Culture, Routine No Growth to date     Blood Culture, Routine No Growth to date    Blood culture #2 **CANNOT BE ORDERED STAT**    Specimen: Peripheral, Antecubital, Right; Blood   Result Value Ref Range    Blood Culture, Routine No Growth to date     Blood Culture, Routine No Growth to date    Culture, Body Fluid (Aerobic) w/ GS    Specimen: Pleural Fluid   Result Value Ref Range    Gram Stain Result Rare WBC's     Gram Stain Result No organisms seen    KOH prep    Specimen: Pleural Fluid   Result Value Ref Range    KOH Prep No yeast or fungal elements seen    CBC auto differential   Result Value Ref Range    WBC  17.76 (H) 3.90 - 12.70 K/uL    RBC 4.93 4.60 - 6.20 M/uL    Hemoglobin 13.7 (L) 14.0 - 18.0 g/dL    Hematocrit 39.2 (L) 40.0 - 54.0 %    MCV 80 (L) 82 - 98 fL    MCH 27.8 27.0 - 31.0 pg    MCHC 34.9 32.0 - 36.0 g/dL    RDW 12.2 11.5 - 14.5 %    Platelets 287 150 - 450 K/uL    MPV 9.5 9.2 - 12.9 fL    Immature Granulocytes 0.4 0.0 - 0.5 %    Gran # (ANC) 15.7 (H) 1.8 - 7.7 K/uL    Immature Grans (Abs) 0.07 (H) 0.00 - 0.04 K/uL    Lymph # 0.7 (L) 1.0 - 4.8 K/uL    Mono # 1.2 (H) 0.3 - 1.0 K/uL    Eos # 0.0 0.0 - 0.5 K/uL    Baso # 0.03 0.00 - 0.20 K/uL    nRBC 0 0 /100 WBC    Gran % 88.3 (H) 38.0 - 73.0 %    Lymph % 4.1 (L) 18.0 - 48.0 %    Mono % 6.9 4.0 - 15.0 %    Eosinophil % 0.1 0.0 - 8.0 %    Basophil % 0.2 0.0 - 1.9 %    Differential Method Automated    Comprehensive metabolic panel   Result Value Ref Range    Sodium 136 136 - 145 mmol/L    Potassium 3.8 3.5 - 5.1 mmol/L    Chloride 104 95 - 110 mmol/L    CO2 17 (L) 23 - 29 mmol/L    Glucose 145 (H) 70 - 110 mg/dL    BUN 12 6 - 20 mg/dL    Creatinine 0.8 0.5 - 1.4 mg/dL    Calcium 9.9 8.7 - 10.5 mg/dL    Total Protein 8.3 6.0 - 8.4 g/dL    Albumin 3.1 (L) 3.5 - 5.2 g/dL    Total Bilirubin 3.4 (H) 0.1 - 1.0 mg/dL    Alkaline Phosphatase 128 55 - 135 U/L    AST 51 (H) 10 - 40 U/L    ALT 65 (H) 10 - 44 U/L    Anion Gap 15 8 - 16 mmol/L    eGFR >60 >60 mL/min/1.73 m^2   Brain natriuretic peptide   Result Value Ref Range    BNP <10 0 - 99 pg/mL   Troponin I   Result Value Ref Range    Troponin I <0.006 0.000 - 0.026 ng/mL   COVID-19 Rapid Screening   Result Value Ref Range    SARS-CoV-2 RNA, Amplification, Qual Negative Negative   Lactic acid, plasma   Result Value Ref Range    Lactate (Lactic Acid) 1.3 0.5 - 2.2 mmol/L   D dimer, quantitative   Result Value Ref Range    D-Dimer 2.68 (H) <0.50 mg/L FEU   Urinalysis, Reflex to Urine Culture Urine, Clean Catch    Specimen: Urine   Result Value Ref Range    Specimen UA Urine, Clean Catch     Color, UA Orange (A)  Yellow, Straw, Christina    Appearance, UA Clear Clear    pH, UA 7.0 5.0 - 8.0    Specific Gravity, UA >1.030 (A) 1.005 - 1.030    Protein, UA 2+ (A) Negative    Glucose, UA Trace (A) Negative    Ketones, UA Negative Negative    Bilirubin (UA) 2+ (A) Negative    Occult Blood UA Negative Negative    Nitrite, UA Negative Negative    Urobilinogen, UA >=8.0 (A) <2.0 EU/dL    Leukocytes, UA Negative Negative   Urinalysis Microscopic   Result Value Ref Range    RBC, UA 2 0 - 4 /hpf    WBC, UA 2 0 - 5 /hpf    WBC Clumps, UA Rare None-Rare    Bacteria None None-Occ /hpf    Hyaline Casts, UA 0 0-1/lpf /lpf    Microscopic Comment SEE COMMENT    WBC & Diff,Body Fluid Pleural Fluid, Left   Result Value Ref Range    Body Fluid Type Pleural Fluid, Left     Fluid Appearance Turbid     Fluid Color Christina     WBC, Body Fluid 34384 /cu mm    Segs, Fluid 30 %    Lymphs, Fluid 2 %    Monocytes/Macrophages, Fluid 9 %    Eos, Fluid 59 %   Comprehensive metabolic panel   Result Value Ref Range    Sodium 136 136 - 145 mmol/L    Potassium 3.6 3.5 - 5.1 mmol/L    Chloride 106 95 - 110 mmol/L    CO2 17 (L) 23 - 29 mmol/L    Glucose 126 (H) 70 - 110 mg/dL    BUN 14 6 - 20 mg/dL    Creatinine 0.8 0.5 - 1.4 mg/dL    Calcium 8.9 8.7 - 10.5 mg/dL    Total Protein 7.3 6.0 - 8.4 g/dL    Albumin 2.6 (L) 3.5 - 5.2 g/dL    Total Bilirubin 2.4 (H) 0.1 - 1.0 mg/dL    Alkaline Phosphatase 118 55 - 135 U/L    AST 57 (H) 10 - 40 U/L    ALT 63 (H) 10 - 44 U/L    Anion Gap 13 8 - 16 mmol/L    eGFR >60 >60 mL/min/1.73 m^2   CBC auto differential   Result Value Ref Range    WBC 16.95 (H) 3.90 - 12.70 K/uL    RBC 4.31 (L) 4.60 - 6.20 M/uL    Hemoglobin 11.7 (L) 14.0 - 18.0 g/dL    Hematocrit 34.8 (L) 40.0 - 54.0 %    MCV 81 (L) 82 - 98 fL    MCH 27.1 27.0 - 31.0 pg    MCHC 33.6 32.0 - 36.0 g/dL    RDW 12.1 11.5 - 14.5 %    Platelets 293 150 - 450 K/uL    MPV 10.3 9.2 - 12.9 fL    Immature Granulocytes 0.8 (H) 0.0 - 0.5 %    Gran # (ANC) 14.3 (H) 1.8 - 7.7 K/uL     Immature Grans (Abs) 0.13 (H) 0.00 - 0.04 K/uL    Lymph # 1.4 1.0 - 4.8 K/uL    Mono # 1.1 (H) 0.3 - 1.0 K/uL    Eos # 0.0 0.0 - 0.5 K/uL    Baso # 0.03 0.00 - 0.20 K/uL    nRBC 0 0 /100 WBC    Gran % 84.4 (H) 38.0 - 73.0 %    Lymph % 8.0 (L) 18.0 - 48.0 %    Mono % 6.5 4.0 - 15.0 %    Eosinophil % 0.1 0.0 - 8.0 %    Basophil % 0.2 0.0 - 1.9 %    Differential Method Automated          Significant Imaging: I have reviewed all pertinent imaging results/findings within the past 24 hours.    X-Ray Chest AP Portable   Final Result      In comparison to the prior study, there is no adverse interval changes         Electronically signed by: Zane Kingston MD   Date:    11/28/2022   Time:    12:46      US Thoracentesis with Imaging, Aspiration Only   Final Result      Left-sided thoracentesis without evidence of immediate complication.         Electronically signed by: Zane Kingston MD   Date:    11/28/2022   Time:    12:45      US Abdomen Limited   Final Result      No acute abnormalities         Electronically signed by: Zane Kingston MD   Date:    11/28/2022   Time:    12:44      US Lower Extremity Veins Bilateral   Final Result      No evidence of deep venous thrombosis in either lower extremity.         Electronically signed by: Garrett Rodríguez   Date:    11/28/2022   Time:    09:40      CTA Chest Non-Coronary (PE Studies)   Final Result      No evidence of pulmonary embolism.  Limited bolus tracking.      Dense consolidation seen throughout bilateral lower lobes.   Multiloculated left pleural effusion which is small to moderate.      See additional findings above      All CT scans at this facility use dose modulation, iterative reconstruction and/or weight based dosing when appropriate to reduce radiation dose to as low as reasonably achievable.         Electronically signed by: Zane Kingston MD   Date:    11/28/2022   Time:    09:02      X-ray Chest PA And Lateral   Final Result      Small to moderate left-sided pleural  effusion with left basilar atelectasis or airspace disease.         Electronically signed by: Zane Kingston MD   Date:    11/28/2022   Time:    08:05      X-Ray Chest AP Portable   ED Interpretation   Left lower lobe infiltrate      Final Result      Small to moderate left-sided pleural effusion with left basilar atelectasis or airspace disease.         Electronically signed by: Zane Kingston MD   Date:    11/28/2022   Time:    07:58

## 2022-11-29 NOTE — SUBJECTIVE & OBJECTIVE
No current facility-administered medications on file prior to encounter.     Current Outpatient Medications on File Prior to Encounter   Medication Sig    diclofenac (VOLTAREN) 50 MG EC tablet Take 1 tablet (50 mg total) by mouth 3 (three) times daily as needed.    sertraline (ZOLOFT) 50 MG tablet Take 50 mg by mouth once daily.       Review of patient's allergies indicates:   Allergen Reactions    Ultram [tramadol] Rash       Past Medical History:   Diagnosis Date    Back pain     Bulging disc     L5     History reviewed. No pertinent surgical history.  Family History    None       Tobacco Use    Smoking status: Every Day    Smokeless tobacco: Not on file   Substance and Sexual Activity    Alcohol use: No    Drug use: Not on file    Sexual activity: Not on file     Review of Systems   Constitutional:  Positive for activity change.   HENT: Negative.     Eyes: Negative.    Respiratory:  Positive for cough and shortness of breath.    Cardiovascular:  Positive for chest pain.   Gastrointestinal: Negative.    Endocrine: Negative.    Genitourinary: Negative.    Musculoskeletal:  Positive for back pain.   Skin: Negative.    Allergic/Immunologic: Negative.    Neurological: Negative.    Hematological: Negative.    Psychiatric/Behavioral:  Positive for dysphoric mood. The patient is nervous/anxious.    Objective:     Vital Signs (Most Recent):  Temp: 97.9 °F (36.6 °C) (11/29/22 0701)  Pulse: 94 (11/29/22 0801)  Resp: (!) 33 (11/29/22 0801)  BP: (!) 154/74 (11/29/22 0801)  SpO2: (!) 90 % (11/29/22 0801)   Vital Signs (24h Range):  Temp:  [97.9 °F (36.6 °C)-99.2 °F (37.3 °C)] 97.9 °F (36.6 °C)  Pulse:  [] 94  Resp:  [15-54] 33  SpO2:  [90 %-97 %] 90 %  BP: (124-166)/() 154/74     Weight: 89 kg (196 lb 3.4 oz)  Body mass index is 24.52 kg/m².    SpO2: (!) 90 %  O2 Device (Oxygen Therapy): venti mask (found on venti mask)     Intake/Output - Last 3 Shifts         11/27 0700 11/28 0659 11/28 0700 11/29 0659 11/29  0700  11/30 0659    I.V. (mL/kg)  1653.2 (18.6)     IV Piggyback 301.6 718.9     Total Intake(mL/kg) 301.6 (3.5) 2372.1 (26.7)     Urine (mL/kg/hr)  400 (0.2)     Stool  0     Total Output  400     Net +301.6 +1972.1            Stool Occurrence  1 x              Lines/Drains/Airways       Peripheral Intravenous Line  Duration                  Peripheral IV - Single Lumen 11/28/22 0259 20 G Anterior;Distal;Left Upper Arm 1 day         Peripheral IV - Single Lumen 11/29/22 0035 20 G Anterior;Right Wrist <1 day                     STS Risk Score: n/a    Physical Exam  Constitutional:       Appearance: Normal appearance.   HENT:      Head: Normocephalic and atraumatic.      Mouth/Throat:      Mouth: Mucous membranes are moist.   Eyes:      Extraocular Movements: Extraocular movements intact.      Pupils: Pupils are equal, round, and reactive to light.   Cardiovascular:      Rate and Rhythm: Tachycardia present.      Pulses: Normal pulses.      Heart sounds: Normal heart sounds.   Pulmonary:      Effort: Pulmonary effort is normal.   Abdominal:      General: Abdomen is flat. Bowel sounds are normal.      Palpations: Abdomen is soft.   Musculoskeletal:      Right lower leg: No edema.      Left lower leg: No edema.   Skin:     General: Skin is warm and dry.   Neurological:      Mental Status: He is alert and oriented to person, place, and time.   Psychiatric:         Behavior: Behavior normal.       Significant Labs:  All pertinent labs from the last 24 hours have been reviewed.    Significant Diagnostics:  I have reviewed all pertinent imaging results/findings within the past 24 hours.

## 2022-11-30 ENCOUNTER — ANESTHESIA (OUTPATIENT)
Dept: SURGERY | Facility: HOSPITAL | Age: 37
DRG: 853 | End: 2022-11-30
Payer: MEDICAID

## 2022-11-30 PROBLEM — J86.9 EMPYEMA: Status: ACTIVE | Noted: 2022-11-28

## 2022-11-30 LAB
ABO + RH BLD: NORMAL
AMPHET+METHAMPHET UR QL: NEGATIVE
ANION GAP SERPL CALC-SCNC: 10 MMOL/L (ref 8–16)
APTT BLDCRRT: 28.4 SEC (ref 21–32)
BARBITURATES UR QL SCN>200 NG/ML: NEGATIVE
BENZODIAZ UR QL SCN>200 NG/ML: NEGATIVE
BLD GP AB SCN CELLS X3 SERPL QL: NORMAL
BUN SERPL-MCNC: 11 MG/DL (ref 6–20)
BZE UR QL SCN: NEGATIVE
CALCIUM SERPL-MCNC: 9.2 MG/DL (ref 8.7–10.5)
CANNABINOIDS UR QL SCN: ABNORMAL
CHLORIDE SERPL-SCNC: 106 MMOL/L (ref 95–110)
CO2 SERPL-SCNC: 18 MMOL/L (ref 23–29)
CREAT SERPL-MCNC: 0.7 MG/DL (ref 0.5–1.4)
CREAT UR-MCNC: 184.8 MG/DL (ref 23–375)
EST. GFR  (NO RACE VARIABLE): >60 ML/MIN/1.73 M^2
ESTIMATED AVG GLUCOSE: 97 MG/DL (ref 68–131)
FLOW CYTOMETRY ANTIBODIES ANALYZED - FLUID: NORMAL
FLOW CYTOMETRY COMMENT - FLUID: NORMAL
FLOW CYTOMETRY INTERPRETATION - FLUID: NORMAL
FLUID TYPE: NORMAL
GLUCOSE SERPL-MCNC: 97 MG/DL (ref 70–110)
HBA1C MFR BLD: 5 % (ref 4–5.6)
METHADONE UR QL SCN>300 NG/ML: NEGATIVE
MRSA SPEC QL CULT: NORMAL
OPIATES UR QL SCN: ABNORMAL
PCP UR QL SCN>25 NG/ML: NEGATIVE
POTASSIUM SERPL-SCNC: 3.9 MMOL/L (ref 3.5–5.1)
PREALB SERPL-MCNC: 10 MG/DL (ref 20–43)
SODIUM SERPL-SCNC: 134 MMOL/L (ref 136–145)
TOXICOLOGY INFORMATION: ABNORMAL

## 2022-11-30 PROCEDURE — 88305 TISSUE EXAM BY PATHOLOGIST: CPT | Mod: 26,,, | Performed by: PATHOLOGY

## 2022-11-30 PROCEDURE — 25000003 PHARM REV CODE 250: Performed by: NURSE PRACTITIONER

## 2022-11-30 PROCEDURE — 25000003 PHARM REV CODE 250: Performed by: INTERNAL MEDICINE

## 2022-11-30 PROCEDURE — 63600175 PHARM REV CODE 636 W HCPCS: Performed by: NURSE PRACTITIONER

## 2022-11-30 PROCEDURE — 63600175 PHARM REV CODE 636 W HCPCS: Performed by: NURSE ANESTHETIST, CERTIFIED REGISTERED

## 2022-11-30 PROCEDURE — 88112 CYTOPATH CELL ENHANCE TECH: CPT | Mod: 26,,, | Performed by: PATHOLOGY

## 2022-11-30 PROCEDURE — 20000000 HC ICU ROOM

## 2022-11-30 PROCEDURE — 87070 CULTURE OTHR SPECIMN AEROBIC: CPT | Performed by: INTERNAL MEDICINE

## 2022-11-30 PROCEDURE — C1729 CATH, DRAINAGE: HCPCS | Performed by: THORACIC SURGERY (CARDIOTHORACIC VASCULAR SURGERY)

## 2022-11-30 PROCEDURE — 88305 TISSUE EXAM BY PATHOLOGIST: ICD-10-PCS | Mod: 26,,, | Performed by: PATHOLOGY

## 2022-11-30 PROCEDURE — 88305 TISSUE EXAM BY PATHOLOGIST: CPT | Mod: 59 | Performed by: PATHOLOGY

## 2022-11-30 PROCEDURE — 85730 THROMBOPLASTIN TIME PARTIAL: CPT | Performed by: THORACIC SURGERY (CARDIOTHORACIC VASCULAR SURGERY)

## 2022-11-30 PROCEDURE — 63600175 PHARM REV CODE 636 W HCPCS: Performed by: INTERNAL MEDICINE

## 2022-11-30 PROCEDURE — C9290 INJ, BUPIVACAINE LIPOSOME: HCPCS | Performed by: THORACIC SURGERY (CARDIOTHORACIC VASCULAR SURGERY)

## 2022-11-30 PROCEDURE — 25000242 PHARM REV CODE 250 ALT 637 W/ HCPCS: Performed by: THORACIC SURGERY (CARDIOTHORACIC VASCULAR SURGERY)

## 2022-11-30 PROCEDURE — 88112 CYTOPATH CELL ENHANCE TECH: CPT | Performed by: PATHOLOGY

## 2022-11-30 PROCEDURE — 87116 MYCOBACTERIA CULTURE: CPT | Performed by: INTERNAL MEDICINE

## 2022-11-30 PROCEDURE — 94640 AIRWAY INHALATION TREATMENT: CPT

## 2022-11-30 PROCEDURE — 27201423 OPTIME MED/SURG SUP & DEVICES STERILE SUPPLY: Performed by: THORACIC SURGERY (CARDIOTHORACIC VASCULAR SURGERY)

## 2022-11-30 PROCEDURE — 25000003 PHARM REV CODE 250: Performed by: ANESTHESIOLOGY

## 2022-11-30 PROCEDURE — 87102 FUNGUS ISOLATION CULTURE: CPT | Performed by: INTERNAL MEDICINE

## 2022-11-30 PROCEDURE — 37000008 HC ANESTHESIA 1ST 15 MINUTES: Performed by: THORACIC SURGERY (CARDIOTHORACIC VASCULAR SURGERY)

## 2022-11-30 PROCEDURE — 25000003 PHARM REV CODE 250: Performed by: NURSE ANESTHETIST, CERTIFIED REGISTERED

## 2022-11-30 PROCEDURE — 32652 THORACOSCOPY REM TOTL CORTEX: CPT | Mod: LT,,, | Performed by: THORACIC SURGERY (CARDIOTHORACIC VASCULAR SURGERY)

## 2022-11-30 PROCEDURE — 37000009 HC ANESTHESIA EA ADD 15 MINS: Performed by: THORACIC SURGERY (CARDIOTHORACIC VASCULAR SURGERY)

## 2022-11-30 PROCEDURE — 88305 TISSUE EXAM BY PATHOLOGIST: CPT | Performed by: PATHOLOGY

## 2022-11-30 PROCEDURE — 80307 DRUG TEST PRSMV CHEM ANLYZR: CPT | Performed by: INTERNAL MEDICINE

## 2022-11-30 PROCEDURE — 36415 COLL VENOUS BLD VENIPUNCTURE: CPT | Performed by: INTERNAL MEDICINE

## 2022-11-30 PROCEDURE — 80048 BASIC METABOLIC PNL TOTAL CA: CPT | Performed by: INTERNAL MEDICINE

## 2022-11-30 PROCEDURE — 63600175 PHARM REV CODE 636 W HCPCS: Performed by: ANESTHESIOLOGY

## 2022-11-30 PROCEDURE — 25000003 PHARM REV CODE 250: Performed by: THORACIC SURGERY (CARDIOTHORACIC VASCULAR SURGERY)

## 2022-11-30 PROCEDURE — 87206 SMEAR FLUORESCENT/ACID STAI: CPT | Performed by: INTERNAL MEDICINE

## 2022-11-30 PROCEDURE — 36000711: Performed by: THORACIC SURGERY (CARDIOTHORACIC VASCULAR SURGERY)

## 2022-11-30 PROCEDURE — 36000710: Performed by: THORACIC SURGERY (CARDIOTHORACIC VASCULAR SURGERY)

## 2022-11-30 PROCEDURE — 32652 PR THORACOSCOPY SURG TOT PULM DECORT: ICD-10-PCS | Mod: LT,,, | Performed by: THORACIC SURGERY (CARDIOTHORACIC VASCULAR SURGERY)

## 2022-11-30 PROCEDURE — 71000033 HC RECOVERY, INTIAL HOUR: Performed by: THORACIC SURGERY (CARDIOTHORACIC VASCULAR SURGERY)

## 2022-11-30 PROCEDURE — 99900035 HC TECH TIME PER 15 MIN (STAT)

## 2022-11-30 PROCEDURE — 36415 COLL VENOUS BLD VENIPUNCTURE: CPT | Performed by: THORACIC SURGERY (CARDIOTHORACIC VASCULAR SURGERY)

## 2022-11-30 PROCEDURE — 63600175 PHARM REV CODE 636 W HCPCS: Performed by: THORACIC SURGERY (CARDIOTHORACIC VASCULAR SURGERY)

## 2022-11-30 PROCEDURE — 88112 PR  CYTOPATH, CELL ENHANCE TECH: ICD-10-PCS | Mod: 26,,, | Performed by: PATHOLOGY

## 2022-11-30 PROCEDURE — 94799 UNLISTED PULMONARY SVC/PX: CPT

## 2022-11-30 PROCEDURE — 87075 CULTR BACTERIA EXCEPT BLOOD: CPT | Performed by: INTERNAL MEDICINE

## 2022-11-30 RX ORDER — BUPIVACAINE HYDROCHLORIDE 2.5 MG/ML
INJECTION, SOLUTION EPIDURAL; INFILTRATION; INTRACAUDAL
Status: DISCONTINUED | OUTPATIENT
Start: 2022-11-30 | End: 2022-11-30 | Stop reason: HOSPADM

## 2022-11-30 RX ORDER — ACETAMINOPHEN 325 MG/1
650 TABLET ORAL EVERY 6 HOURS
Status: DISPENSED | OUTPATIENT
Start: 2022-11-30 | End: 2022-12-03

## 2022-11-30 RX ORDER — SODIUM CHLORIDE 0.9 % (FLUSH) 0.9 %
3 SYRINGE (ML) INJECTION
Status: DISCONTINUED | OUTPATIENT
Start: 2022-11-30 | End: 2022-12-05 | Stop reason: HOSPADM

## 2022-11-30 RX ORDER — DEXMEDETOMIDINE HYDROCHLORIDE 100 UG/ML
INJECTION, SOLUTION INTRAVENOUS
Status: DISCONTINUED | OUTPATIENT
Start: 2022-11-30 | End: 2022-11-30

## 2022-11-30 RX ORDER — LIDOCAINE HYDROCHLORIDE 20 MG/ML
INJECTION INTRAVENOUS
Status: DISCONTINUED | OUTPATIENT
Start: 2022-11-30 | End: 2022-11-30

## 2022-11-30 RX ORDER — DEXTROSE MONOHYDRATE, SODIUM CHLORIDE, AND POTASSIUM CHLORIDE 50; 1.49; 4.5 G/1000ML; G/1000ML; G/1000ML
INJECTION, SOLUTION INTRAVENOUS CONTINUOUS
Status: DISCONTINUED | OUTPATIENT
Start: 2022-11-30 | End: 2022-12-01

## 2022-11-30 RX ORDER — HYDROMORPHONE HYDROCHLORIDE 2 MG/ML
INJECTION, SOLUTION INTRAMUSCULAR; INTRAVENOUS; SUBCUTANEOUS
Status: DISCONTINUED | OUTPATIENT
Start: 2022-11-30 | End: 2022-11-30

## 2022-11-30 RX ORDER — DEXAMETHASONE SODIUM PHOSPHATE 4 MG/ML
INJECTION, SOLUTION INTRA-ARTICULAR; INTRALESIONAL; INTRAMUSCULAR; INTRAVENOUS; SOFT TISSUE
Status: DISCONTINUED | OUTPATIENT
Start: 2022-11-30 | End: 2022-11-30

## 2022-11-30 RX ORDER — IPRATROPIUM BROMIDE AND ALBUTEROL SULFATE 2.5; .5 MG/3ML; MG/3ML
3 SOLUTION RESPIRATORY (INHALATION)
Status: DISCONTINUED | OUTPATIENT
Start: 2022-11-30 | End: 2022-12-05 | Stop reason: HOSPADM

## 2022-11-30 RX ORDER — OXYCODONE HYDROCHLORIDE 5 MG/1
5 TABLET ORAL
Status: DISCONTINUED | OUTPATIENT
Start: 2022-11-30 | End: 2022-11-30 | Stop reason: HOSPADM

## 2022-11-30 RX ORDER — PROCHLORPERAZINE EDISYLATE 5 MG/ML
5 INJECTION INTRAMUSCULAR; INTRAVENOUS EVERY 30 MIN PRN
Status: DISCONTINUED | OUTPATIENT
Start: 2022-11-30 | End: 2022-11-30 | Stop reason: HOSPADM

## 2022-11-30 RX ORDER — KETAMINE HCL IN 0.9 % NACL 50 MG/5 ML
SYRINGE (ML) INTRAVENOUS
Status: DISCONTINUED | OUTPATIENT
Start: 2022-11-30 | End: 2022-11-30

## 2022-11-30 RX ORDER — ONDANSETRON 2 MG/ML
INJECTION INTRAMUSCULAR; INTRAVENOUS
Status: DISCONTINUED | OUTPATIENT
Start: 2022-11-30 | End: 2022-11-30

## 2022-11-30 RX ORDER — ONDANSETRON 8 MG/1
8 TABLET, ORALLY DISINTEGRATING ORAL EVERY 8 HOURS PRN
Status: DISCONTINUED | OUTPATIENT
Start: 2022-11-30 | End: 2022-12-05 | Stop reason: HOSPADM

## 2022-11-30 RX ORDER — POLYETHYLENE GLYCOL 3350 17 G/17G
17 POWDER, FOR SOLUTION ORAL DAILY
Status: DISCONTINUED | OUTPATIENT
Start: 2022-11-30 | End: 2022-12-05 | Stop reason: HOSPADM

## 2022-11-30 RX ORDER — ROCURONIUM BROMIDE 10 MG/ML
INJECTION, SOLUTION INTRAVENOUS
Status: DISCONTINUED | OUTPATIENT
Start: 2022-11-30 | End: 2022-11-30

## 2022-11-30 RX ORDER — KETOROLAC TROMETHAMINE 30 MG/ML
15 INJECTION, SOLUTION INTRAMUSCULAR; INTRAVENOUS EVERY 8 HOURS PRN
Status: DISCONTINUED | OUTPATIENT
Start: 2022-11-30 | End: 2022-11-30 | Stop reason: HOSPADM

## 2022-11-30 RX ORDER — PHENYLEPHRINE HYDROCHLORIDE 10 MG/ML
INJECTION INTRAVENOUS
Status: DISCONTINUED | OUTPATIENT
Start: 2022-11-30 | End: 2022-11-30

## 2022-11-30 RX ORDER — MORPHINE SULFATE 2 MG/ML
1 INJECTION, SOLUTION INTRAMUSCULAR; INTRAVENOUS
Status: DISCONTINUED | OUTPATIENT
Start: 2022-11-30 | End: 2022-12-05 | Stop reason: HOSPADM

## 2022-11-30 RX ORDER — MEPERIDINE HYDROCHLORIDE 25 MG/ML
12.5 INJECTION INTRAMUSCULAR; INTRAVENOUS; SUBCUTANEOUS ONCE
Status: COMPLETED | OUTPATIENT
Start: 2022-11-30 | End: 2022-11-30

## 2022-11-30 RX ORDER — HYDROMORPHONE HYDROCHLORIDE 2 MG/ML
0.2 INJECTION, SOLUTION INTRAMUSCULAR; INTRAVENOUS; SUBCUTANEOUS EVERY 5 MIN PRN
Status: DISCONTINUED | OUTPATIENT
Start: 2022-11-30 | End: 2022-11-30 | Stop reason: HOSPADM

## 2022-11-30 RX ORDER — DIPHENHYDRAMINE HYDROCHLORIDE 50 MG/ML
25 INJECTION INTRAMUSCULAR; INTRAVENOUS EVERY 6 HOURS PRN
Status: DISCONTINUED | OUTPATIENT
Start: 2022-11-30 | End: 2022-12-05 | Stop reason: HOSPADM

## 2022-11-30 RX ORDER — PROPOFOL 10 MG/ML
VIAL (ML) INTRAVENOUS
Status: DISCONTINUED | OUTPATIENT
Start: 2022-11-30 | End: 2022-11-30

## 2022-11-30 RX ORDER — METOCLOPRAMIDE HYDROCHLORIDE 5 MG/ML
5 INJECTION INTRAMUSCULAR; INTRAVENOUS EVERY 6 HOURS PRN
Status: DISCONTINUED | OUTPATIENT
Start: 2022-11-30 | End: 2022-12-05 | Stop reason: HOSPADM

## 2022-11-30 RX ORDER — DOCUSATE SODIUM 100 MG/1
100 CAPSULE, LIQUID FILLED ORAL 2 TIMES DAILY
Status: DISCONTINUED | OUTPATIENT
Start: 2022-11-30 | End: 2022-12-05 | Stop reason: HOSPADM

## 2022-11-30 RX ORDER — OXYCODONE HYDROCHLORIDE 5 MG/1
5 TABLET ORAL EVERY 4 HOURS PRN
Status: DISCONTINUED | OUTPATIENT
Start: 2022-11-30 | End: 2022-12-05 | Stop reason: HOSPADM

## 2022-11-30 RX ORDER — FENTANYL CITRATE 50 UG/ML
INJECTION, SOLUTION INTRAMUSCULAR; INTRAVENOUS
Status: DISCONTINUED | OUTPATIENT
Start: 2022-11-30 | End: 2022-11-30

## 2022-11-30 RX ORDER — ONDANSETRON 2 MG/ML
4 INJECTION INTRAMUSCULAR; INTRAVENOUS DAILY PRN
Status: DISCONTINUED | OUTPATIENT
Start: 2022-11-30 | End: 2022-11-30 | Stop reason: HOSPADM

## 2022-11-30 RX ORDER — MAGNESIUM SULFATE HEPTAHYDRATE 500 MG/ML
INJECTION, SOLUTION INTRAMUSCULAR; INTRAVENOUS
Status: DISCONTINUED | OUTPATIENT
Start: 2022-11-30 | End: 2022-11-30

## 2022-11-30 RX ORDER — ALBUTEROL SULFATE 0.83 MG/ML
2.5 SOLUTION RESPIRATORY (INHALATION) EVERY 4 HOURS PRN
Status: DISCONTINUED | OUTPATIENT
Start: 2022-11-30 | End: 2022-11-30 | Stop reason: HOSPADM

## 2022-11-30 RX ADMIN — VANCOMYCIN HYDROCHLORIDE 1250 MG: 1.25 INJECTION, POWDER, LYOPHILIZED, FOR SOLUTION INTRAVENOUS at 08:11

## 2022-11-30 RX ADMIN — ONDANSETRON 4 MG: 2 INJECTION, SOLUTION INTRAMUSCULAR; INTRAVENOUS at 11:11

## 2022-11-30 RX ADMIN — DEXAMETHASONE SODIUM PHOSPHATE 8 MG: 4 INJECTION, SOLUTION INTRAMUSCULAR; INTRAVENOUS at 10:11

## 2022-11-30 RX ADMIN — VANCOMYCIN HYDROCHLORIDE 1500 MG: 1.5 INJECTION, POWDER, LYOPHILIZED, FOR SOLUTION INTRAVENOUS at 12:11

## 2022-11-30 RX ADMIN — ONDANSETRON 8 MG: 8 TABLET, ORALLY DISINTEGRATING ORAL at 03:11

## 2022-11-30 RX ADMIN — PIPERACILLIN SODIUM AND TAZOBACTAM SODIUM 4.5 G: 4; .5 INJECTION, POWDER, LYOPHILIZED, FOR SOLUTION INTRAVENOUS at 06:11

## 2022-11-30 RX ADMIN — Medication 40 MG: at 10:11

## 2022-11-30 RX ADMIN — HYDROCODONE BITARTRATE AND ACETAMINOPHEN 1 TABLET: 10; 325 TABLET ORAL at 03:11

## 2022-11-30 RX ADMIN — SERTRALINE HYDROCHLORIDE 50 MG: 50 TABLET ORAL at 09:11

## 2022-11-30 RX ADMIN — DOCUSATE SODIUM 100 MG: 100 CAPSULE, LIQUID FILLED ORAL at 09:11

## 2022-11-30 RX ADMIN — MAGNESIUM SULFATE HEPTAHYDRATE 1 G: 500 INJECTION, SOLUTION INTRAMUSCULAR; INTRAVENOUS at 10:11

## 2022-11-30 RX ADMIN — MUPIROCIN: 20 OINTMENT TOPICAL at 09:11

## 2022-11-30 RX ADMIN — TRAZODONE HYDROCHLORIDE 50 MG: 50 TABLET ORAL at 09:11

## 2022-11-30 RX ADMIN — HYDROMORPHONE HYDROCHLORIDE 0.5 MG: 2 INJECTION INTRAMUSCULAR; INTRAVENOUS; SUBCUTANEOUS at 12:11

## 2022-11-30 RX ADMIN — PIPERACILLIN SODIUM AND TAZOBACTAM SODIUM 4.5 G: 4; .5 INJECTION, POWDER, LYOPHILIZED, FOR SOLUTION INTRAVENOUS at 09:11

## 2022-11-30 RX ADMIN — ROCURONIUM BROMIDE 50 MG: 10 INJECTION, SOLUTION INTRAVENOUS at 10:11

## 2022-11-30 RX ADMIN — SODIUM CHLORIDE, SODIUM LACTATE, POTASSIUM CHLORIDE, AND CALCIUM CHLORIDE: .6; .31; .03; .02 INJECTION, SOLUTION INTRAVENOUS at 10:11

## 2022-11-30 RX ADMIN — LIDOCAINE HYDROCHLORIDE 100 MG: 20 INJECTION, SOLUTION INTRAVENOUS at 10:11

## 2022-11-30 RX ADMIN — GLYCOPYRROLATE 0.2 MG: 0.2 INJECTION, SOLUTION INTRAMUSCULAR; INTRAVENOUS at 10:11

## 2022-11-30 RX ADMIN — FENTANYL CITRATE 100 MCG: 50 INJECTION, SOLUTION INTRAMUSCULAR; INTRAVENOUS at 10:11

## 2022-11-30 RX ADMIN — OXYCODONE HYDROCHLORIDE 5 MG: 5 TABLET ORAL at 12:11

## 2022-11-30 RX ADMIN — MUPIROCIN: 20 OINTMENT TOPICAL at 08:11

## 2022-11-30 RX ADMIN — Medication 10 MG: at 10:11

## 2022-11-30 RX ADMIN — FENTANYL CITRATE 100 MCG: 50 INJECTION, SOLUTION INTRAMUSCULAR; INTRAVENOUS at 11:11

## 2022-11-30 RX ADMIN — OXYCODONE HYDROCHLORIDE 5 MG: 5 TABLET ORAL at 08:11

## 2022-11-30 RX ADMIN — Medication 6 MG: at 03:11

## 2022-11-30 RX ADMIN — IPRATROPIUM BROMIDE AND ALBUTEROL SULFATE 3 ML: 2.5; .5 SOLUTION RESPIRATORY (INHALATION) at 07:11

## 2022-11-30 RX ADMIN — ROCURONIUM BROMIDE 30 MG: 10 INJECTION, SOLUTION INTRAVENOUS at 11:11

## 2022-11-30 RX ADMIN — IPRATROPIUM BROMIDE AND ALBUTEROL SULFATE 3 ML: 2.5; .5 SOLUTION RESPIRATORY (INHALATION) at 02:11

## 2022-11-30 RX ADMIN — PROPOFOL 80 MG: 10 INJECTION, EMULSION INTRAVENOUS at 10:11

## 2022-11-30 RX ADMIN — ONDANSETRON 4 MG: 2 INJECTION INTRAMUSCULAR; INTRAVENOUS at 07:11

## 2022-11-30 RX ADMIN — MEPERIDINE HYDROCHLORIDE 12.5 MG: 25 INJECTION INTRAMUSCULAR; INTRAVENOUS; SUBCUTANEOUS at 12:11

## 2022-11-30 RX ADMIN — PIPERACILLIN SODIUM AND TAZOBACTAM SODIUM 4.5 G: 4; .5 INJECTION, POWDER, LYOPHILIZED, FOR SOLUTION INTRAVENOUS at 03:11

## 2022-11-30 RX ADMIN — SODIUM CHLORIDE, SODIUM LACTATE, POTASSIUM CHLORIDE, AND CALCIUM CHLORIDE: .6; .31; .03; .02 INJECTION, SOLUTION INTRAVENOUS at 12:11

## 2022-11-30 RX ADMIN — DEXTROSE, SODIUM CHLORIDE, AND POTASSIUM CHLORIDE: 5; .45; .15 INJECTION INTRAVENOUS at 03:11

## 2022-11-30 RX ADMIN — DEXMEDETOMIDINE HYDROCHLORIDE 40 MCG: 100 INJECTION, SOLUTION, CONCENTRATE INTRAVENOUS at 10:11

## 2022-11-30 RX ADMIN — PHENYLEPHRINE HYDROCHLORIDE 100 MCG: 10 INJECTION INTRAVENOUS at 11:11

## 2022-11-30 RX ADMIN — OXYCODONE HYDROCHLORIDE 5 MG: 5 TABLET ORAL at 03:11

## 2022-11-30 RX ADMIN — ACETAMINOPHEN 650 MG: 325 TABLET ORAL at 06:11

## 2022-11-30 RX ADMIN — METOCLOPRAMIDE 5 MG: 5 INJECTION, SOLUTION INTRAMUSCULAR; INTRAVENOUS at 06:11

## 2022-11-30 RX ADMIN — PROPOFOL 200 MG: 10 INJECTION, EMULSION INTRAVENOUS at 10:11

## 2022-11-30 RX ADMIN — SUGAMMADEX 200 MG: 100 INJECTION, SOLUTION INTRAVENOUS at 12:11

## 2022-11-30 NOTE — PROGRESS NOTES
Pharmacokinetic Assessment Follow Up: IV Vancomycin    Vancomycin serum concentration assessment(s):    The trough level was drawn correctly and can be used to guide therapy at this time. The measurement is below the desired definitive target range of 15 to 20 mcg/mL.    Vancomycin Regimen Plan:    Change regimen to Vancomycin 1250 mg IV every 8 hours with next serum trough concentration measured at 0700 prior to 5th dose on 12/01    Drug levels (last 3 results):  Recent Labs   Lab Result Units 11/29/22  2313   Vancomycin-Trough ug/mL 7.4*       Pharmacy will continue to follow and monitor vancomycin.    Please contact pharmacy at extension [phone] for questions regarding this assessment.    Thank you for the consult,   Sobia Calderon       Patient brief summary:  Zane Horne Jr. is a 37 y.o. male initiated on antimicrobial therapy with IV Vancomycin for treatment of lower respiratory infection    The patient's current regimen is vancomycin 1250 mg IV every 8 hours.     Drug Allergies:   Review of patient's allergies indicates:   Allergen Reactions    Ultram [tramadol] Rash       Actual Body Weight:   89 kg    Renal Function:   Estimated Creatinine Clearance: 154 mL/min (based on SCr of 0.8 mg/dL).,     Dialysis Method (if applicable):  N/A    CBC (last 72 hours):  Recent Labs   Lab Result Units 11/28/22  0256 11/29/22  0412   WBC K/uL 17.76* 16.95*   Hemoglobin g/dL 13.7* 11.7*   Hematocrit % 39.2* 34.8*   Platelets K/uL 287 293   Gran % % 88.3* 84.4*   Lymph % % 4.1* 8.0*   Mono % % 6.9 6.5   Eosinophil % % 0.1 0.1   Basophil % % 0.2 0.2   Differential Method  Automated Automated       Metabolic Panel (last 72 hours):  Recent Labs   Lab Result Units 11/28/22  0256 11/28/22  0813 11/29/22  0411 11/29/22  1642   Sodium mmol/L 136  --  136 136   Potassium mmol/L 3.8  --  3.6 3.7   Chloride mmol/L 104  --  106 105   CO2 mmol/L 17*  --  17* 18*   Glucose mg/dL 145*  --  126* 100   Glucose, UA   --  Trace*  --   --     BUN mg/dL 12  --  14 12   Creatinine mg/dL 0.8  --  0.8 0.8   Albumin g/dL 3.1*  --  2.6*  --    Total Bilirubin mg/dL 3.4*  --  2.4*  --    Alkaline Phosphatase U/L 128  --  118  --    AST U/L 51*  --  57*  --    ALT U/L 65*  --  63*  --    Magnesium mg/dL  --   --   --  1.9       Vancomycin Administrations:  vancomycin given in the last 96 hours                     vancomycin 1.5 g in dextrose 5 % 250 mL IVPB (ready to mix) (mg) 1,500 mg New Bag 11/29/22 1126     1,500 mg New Bag  0006    vancomycin (VANCOCIN) 2,250 mg in dextrose 5 % 500 mL IVPB ()  Restarted 11/28/22 1229     2,250 mg New Bag  1157                    Microbiologic Results:  Microbiology Results (last 7 days)       Procedure Component Value Units Date/Time    AFB Culture & Smear [[id number]] Collected: 11/28/22 1124    Order Status: Completed Specimen: Pleural Fluid Updated: 11/29/22 1305     AFB CULTURE STAIN No acid fast bacilli seen.    Blood culture #1 **CANNOT BE ORDERED STAT** [[id number]] Collected: 11/28/22 0240    Order Status: Completed Specimen: Blood from Peripheral, Antecubital, Left Updated: 11/29/22 1012     Blood Culture, Routine No Growth to date      No Growth to date    Blood culture #2 **CANNOT BE ORDERED STAT** [[id number]] Collected: 11/28/22 0303    Order Status: Completed Specimen: Blood from Peripheral, Antecubital, Right Updated: 11/29/22 1012     Blood Culture, Routine No Growth to date      No Growth to date    Culture, Body Fluid (Aerobic) w/ GS [[id number]] Collected: 11/28/22 1124    Order Status: Completed Specimen: Pleural Fluid Updated: 11/29/22 0449     Gram Stain Result Rare WBC's      No organisms seen    KOH prep [[id number]] Collected: 11/28/22 1124    Order Status: Completed Specimen: Pleural Fluid Updated: 11/29/22 0325     KOH Prep No yeast or fungal elements seen    Fungus culture [[id number]] Collected: 11/28/22 1124    Order Status: Sent Specimen: Pleural Fluid Updated: 11/28/22 8015    Culture, MRSA  [[id number]] Collected: 11/28/22 1152    Order Status: Sent Specimen: MRSA source from Nares, Right Updated: 11/28/22 2324    Culture, Respiratory with Gram Stain [[id number]]     Order Status: No result Specimen: Respiratory     Influenza A & B by Molecular [[id number]] Collected: 11/28/22 0131    Order Status: Completed Specimen: Nasopharyngeal Swab Updated: 11/28/22 0203     Influenza A, Molecular Negative     Influenza B, Molecular Negative     Flu A & B Source NP

## 2022-11-30 NOTE — TRANSFER OF CARE
"Anesthesia Transfer of Care Note    Patient: Zane Horne JrBenigno    Procedure(s) Performed: Procedure(s) (LRB):  VATS, WITH DECORTICATION, LUNG (Left)  BLOCK, NERVE, INTERCOSTAL, 2 OR MORE (Left)    Patient location: PACU    Anesthesia Type: general    Transport from OR: Transported from OR on room air with adequate spontaneous ventilation    Post pain: adequate analgesia    Post assessment: no apparent anesthetic complications and tolerated procedure well    Post vital signs: stable    Level of consciousness: awake and alert    Nausea/Vomiting: no nausea/vomiting    Complications: none    Transfer of care protocol was followed      Last vitals:   Visit Vitals  BP (!) 141/84 (BP Location: Right arm, Patient Position: Lying)   Pulse 83   Temp 36.8 °C (98.2 °F) (Oral)   Resp 20   Ht 6' 3" (1.905 m)   Wt 89 kg (196 lb 3.4 oz)   SpO2 (!) 93%   BMI 24.52 kg/m²     "

## 2022-11-30 NOTE — PROGRESS NOTES
[facility] ([facility])  [facility] Medicine  Progress Note    Patient Name: Zane Horne Jr.  MRN: [id number]  Patient Class: IP- Inpatient   Admission Date: 11/28/2022  Length of Stay: 2 days  Attending Physician: Marco Nguyễn, *  Primary Care Provider: Primary Doctor No        Subjective:     Principal Problem:Empyema        HPI:     Mr. Horne is a 37 year old male with a history of depression and tobacco use (1 pack per day x 15 years) who presents to the ED with complaints of worsening shortness of breath x 3 days assocaited with productive cough and chest pain, symptoms are constant and moderate in nature, no relieving or exacerbating factors.  Upon arrival in the ED, patient tachypneic, tachycardiac, o2 sats mid 80's on room air.  Placed on 2 liters with noted improvement.  Lab work notable for WBC 17, bili 3.4,ALT 65, AST 51, normal troponin and BNP.  EKG showed ST.  Lactic pending.  Chest xray showed PNA.  Patient was given bolus, blood cultures drawn, started on abx and breathing treatments.  He will be admitted to hospital medicine service for acute hypoxic resp failure/sepsis d/t pna.     At assessment, patient lying in bed, o2 sats stable on 2 liters, complaints of yellow tinged productive cough and chest pain that is worse with movement, cough or deep breath. Of note, earlier last month patient did have a lap cr and has since followed up with his surgeon and has been doing well since that time.     Code Status, Full  Surrogate Decision Maker, wife, Mary      Overview/Hospital Course:  36 y/o WM admitted with a DX of PNA  and  Loculated parapneumonic effusion . Started on IV zosyn and vanc . S/P Thoracentesis  -  250 ccs of dark anibal fluid removed  . Pleural effusion cell diff show a wbc > 30 k .PH and glucose pleural fluid pending .  CTS consulted  and performed a VATS on 11/30 ( Multiple loculated pockets of purulent fluid with fibrinous purulent adhesions.  Well developed  rind over the left lower lobe) . The blood cx and Pleural effusion  cx are NGTD .       Interval History:     Review of Systems   Constitutional:  Positive for activity change.   HENT: Negative.     Eyes: Negative.    Respiratory:  Positive for cough and shortness of breath.    Cardiovascular:  Negative for chest pain.   Gastrointestinal: Negative.    Endocrine: Negative.    Genitourinary: Negative.    Musculoskeletal:  Positive for back pain.   Skin: Negative.    Allergic/Immunologic: Negative.    Neurological: Negative.    Hematological: Negative.    Psychiatric/Behavioral:  Positive for dysphoric mood. The patient is nervous/anxious.    Objective:     Vital Signs (Most Recent):  Temp: 97 °F (36.1 °C) (11/30/22 1220)  Pulse: 75 (11/30/22 1415)  Resp: 16 (11/30/22 1415)  BP: (!) 142/81 (11/30/22 1245)  SpO2: 97 % (11/30/22 1415)   Vital Signs (24h Range):  Temp:  [97 °F (36.1 °C)-99.9 °F (37.7 °C)] 97 °F (36.1 °C)  Pulse:  [72-95] 75  Resp:  [16-48] 16  SpO2:  [91 %-100 %] 97 %  BP: (122-179)/() 142/81     Weight: 89 kg (196 lb 3.4 oz)  Body mass index is 24.52 kg/m².    Intake/Output Summary (Last 24 hours) at 11/30/2022 1528  Last data filed at 11/30/2022 1305  Gross per 24 hour   Intake 1782.02 ml   Output 360 ml   Net 1422.02 ml      Physical Exam  Constitutional:       Appearance: Normal appearance.   HENT:      Head: Normocephalic and atraumatic.      Mouth/Throat:      Mouth: Mucous membranes are moist.   Eyes:      Extraocular Movements: Extraocular movements intact.      Pupils: Pupils are equal, round, and reactive to light.   Cardiovascular:      Rate and Rhythm: Normal rate.      Pulses: Normal pulses.      Heart sounds: Normal heart sounds.   Pulmonary:      Effort: Pulmonary effort is normal.   Abdominal:      General: Abdomen is flat. Bowel sounds are normal.      Palpations: Abdomen is soft.   Musculoskeletal:      Right lower leg: No edema.      Left lower leg: No edema.   Skin:     General:  Skin is warm and dry.   Neurological:      Mental Status: He is alert and oriented to person, place, and time.   Psychiatric:         Behavior: Behavior normal.       Significant Labs: All pertinent labs within the past 24 hours have been reviewed.      Significant Imaging: I have reviewed all pertinent imaging results/findings within the past 24 hours.        Assessment/Plan:      * Empyema  -S/P Thoracentesis which sow a > 30 l wbc   - Highly suspicious of empyema   -Cont IVAB  -F/U cx   -CTS consulted . S/p VATS =  Multiple loculated pockets of purulent fluid with fibrinous purulent adhesions.  Well developed rind over the left lower lobe.      Tobacco use  -1 pack per day x 15 years approx  -decline nicotine patch  counseled on cessation      Sepsis without acute organ dysfunction  2/2 to PNA and possible empyema       Chest pain on breathing  - likely pleuritic in nature, worse with cough/deep breath  - tend troponin, have been negative thus far  - po pain medication prn      PNA (pneumonia)  - IV abx   -Loculated left pleural effusion       Acute respiratory failure with hypoxia  Patient with Hypoxic Respiratory failure which is Acute.  he is not on home oxygen. Supplemental oxygen was provided and noted- Oxygen Concentration (%):  [35-98] 98.   Signs/symptoms of respiratory failure include- tachypnea and increased work of breathing. Contributing diagnoses includes - Pneumonia Labs and images were reviewed. Patient Has not had a recent ABG. Will treat underlying causes and adjust management of respiratory failure as follows.  - continue iv abx for pna  - duo nebs as needed  - wean o2 to keep sats > 90%        VTE Risk Mitigation (From admission, onward)         Ordered     IP VTE LOW RISK PATIENT  Once         11/29/22 1917     Place SILVINO hose  Until discontinued         11/29/22 1917     Place sequential compression device  Until discontinued         11/28/22 9157                Discharge Planning   IVY:       Code Status: Full Code   Is the patient medically ready for discharge?:     Reason for patient still in hospital (select all that apply): Treatment  Discharge Plan A: Home, Home with family                  Marco Nguyễn MD  Department of Hospital Medicine   [facility] ([facility])

## 2022-11-30 NOTE — OP NOTE
[facility] - Int[facility] ([facility])  Cardiothoracic Surgery  Operative Note    SUMMARY     Date of Procedure: 11/30/2022     Procedure: Procedure(s) (LRB):  VATS, WITH DECORTICATION, LUNG (Left)  BLOCK, NERVE, INTERCOSTAL, 2 OR MORE (Left)      Left video-assisted thoracoscopic surgery with evacuation of empyema and decortication.  Surgeon(s) and Role:     * Matt Martinez MD - Primary    Assisting Surgeon: None    Pre-Operative Diagnosis: Pneumonia of left lower lobe due to infectious organism [J18.9]  Pleural effusion [J90]    Post-Operative Diagnosis: Post-Op Diagnosis Codes:     * Pneumonia of left lower lobe due to infectious organism [J18.9]     * Pleural effusion [J90]    Anesthesia: General    Operative Findings (including complications, if any):  Multiple loculated pockets of purulent fluid with fibrinous purulent adhesions.  Well developed rind over the left lower lobe.(pictures in patient's chart)    Description of Technical Procedures:  The patient was placed in a left lateral decubitus position.  The patient was then prepped and draped sterilely.  An incision about 1 cm in length was made in the posterior axillary line 7th intercostal space.  This was then carried down until the pleura was entered.  A thoracoscope trocar was then inserted.  A thoracoscope was then inserted into the pleural space.  Inspection of the pleural shows multiple pockets of purulent fluid with well developed rind over the lower lobe.  The pleural fluid was then suctioned out.  The right over the lower lobe was then removed using suction as well as forceps dissection.  The pleural cavity was then irrigated out with saline solution.  The the lung was then inflated under direct vision.  Two hundred thirty-six Hungarian chest tubes were then inserted.    Significant Surgical Tasks Conducted by the Assistant(s), if Applicable: n/a    Estimated Blood Loss (EBL):  50 mL     Implants: * No implants in log *    Specimens:   Specimen (24h  ago, onward)       Start     Ordered    11/30/22 1155  Specimen to Pathology, Surgery Cardiovascular  Once        Comments: Pre-op Diagnosis: Pneumonia of left lower lobe due to infectious organism [J18.9]Pleural effusion [J90]Procedure(s):   VATS (VIDEO-ASSISTED THORACOSCOPIC SURGERY); BLOCK, NERVE, INTERCOSTAL, 2 OR MORE LEFT LUNGNumber of specimens: 1Name of specimens: 1.  PLEURAL ADHESIONS     References:    Click here for ordering Quick Tip   Question Answer Comment   Procedure Type: Cardiovascular    Specimen Class: Routine/Screening    Which provider would you like to cc? TAIWO PICKENS    Release to patient Immediate        11/30/22 1157    11/30/22 1149  Cytology, Fluid/Wash/Brush  Once        Comments: LEFT PLEURAL FLUID     Question Answer Comment   Source: Pleural Fluid    Clinical Information: left pleural fluid; pleural effusion, empyema    Specific Site: left pleural fluid    Other Requests: n/a    Release to patient Immediate        11/30/22 1157                            Condition: Good    Disposition: PACU - hemodynamically stable.    Attestation: I performed the procedure.

## 2022-11-30 NOTE — INTERVAL H&P NOTE
The patient has been examined and the H&P has been reviewed:    I concur with the findings and no changes have occurred since H&P was written.    Surgery risks, benefits and alternative options discussed and understood by patient/family.          Active Hospital Problems    Diagnosis  POA    *Sepsis without acute organ dysfunction [A41.9]  Yes    Acute respiratory failure with hypoxia [J96.01]  Yes    PNA (pneumonia) [J18.9]  Yes    Chest pain on breathing [R07.1]  Yes    Tobacco use [Z72.0]  Yes     Loculated Paraneumonic left Pleural effusion [J90]  Yes      Resolved Hospital Problems   No resolved problems to display.

## 2022-11-30 NOTE — ANESTHESIA PROCEDURE NOTES
Intubation    Date/Time: 11/30/2022 10:11 AM  Performed by: Trevor Weber CRNA  Authorized by: Justin Lorenz MD     Intubation:     Induction:  Intravenous    Intubated:  Postinduction    Mask Ventilation:  Easy mask    Attempts:  1    Attempted By:  Student and CRNA    Method of Intubation:  Direct    Blade:  Carmel 4    Laryngeal View Grade: Grade I - full view of cords      Difficult Airway Encountered?: No      Complications:  None    Airway Device:  Double lumen tube left    Airway Device Size:  39F    Style/Cuff Inflation:  Cuffed (inflated to minimal occlusive pressure)    Tube secured:  29    Secured at:  The lips    Placement Verified By:  Capnometry    Complicating Factors:  None    Findings Post-Intubation:  Atraumatic/condition of teeth unchanged and BS equal bilateral  Notes:      Fiberoptic visual confirmation of placement with Dr. Lorenz

## 2022-11-30 NOTE — PROGRESS NOTES
Therapy with vancomycin complete and/or consult discontinued by provider.  We will continue piperacillin/tazobactam (Zosyn) alone.  Pharmacy will sign off, please re-consult as needed.    Thank you for allowing us to participate in this patient's care.   Katherine McArdle, Pharm.D. 11/30/2022 12:18 PM

## 2022-11-30 NOTE — NURSING
Patient sitting up in bed watching television. No distress noted. Safety and fall prevention measures are in place. Vital signs are stable. Plan of care discussed with the patient and significant other. Verbalized understanding. Run of ventricular tachycardia reported to Dr. Powell. Chemistry rechecked. Electrolyte replacement as needed. Patient currently in NSR and remains asymptomatic. Will monitor for needs/changes.

## 2022-11-30 NOTE — SUBJECTIVE & OBJECTIVE
Interval History:     Review of Systems   Constitutional:  Positive for activity change.   HENT: Negative.     Eyes: Negative.    Respiratory:  Positive for cough and shortness of breath.    Cardiovascular:  Negative for chest pain.   Gastrointestinal: Negative.    Endocrine: Negative.    Genitourinary: Negative.    Musculoskeletal:  Positive for back pain.   Skin: Negative.    Allergic/Immunologic: Negative.    Neurological: Negative.    Hematological: Negative.    Psychiatric/Behavioral:  Positive for dysphoric mood. The patient is nervous/anxious.    Objective:     Vital Signs (Most Recent):  Temp: 97 °F (36.1 °C) (11/30/22 1220)  Pulse: 75 (11/30/22 1415)  Resp: 16 (11/30/22 1415)  BP: (!) 142/81 (11/30/22 1245)  SpO2: 97 % (11/30/22 1415)   Vital Signs (24h Range):  Temp:  [97 °F (36.1 °C)-99.9 °F (37.7 °C)] 97 °F (36.1 °C)  Pulse:  [72-95] 75  Resp:  [16-48] 16  SpO2:  [91 %-100 %] 97 %  BP: (122-179)/() 142/81     Weight: 89 kg (196 lb 3.4 oz)  Body mass index is 24.52 kg/m².    Intake/Output Summary (Last 24 hours) at 11/30/2022 1528  Last data filed at 11/30/2022 1305  Gross per 24 hour   Intake 1782.02 ml   Output 360 ml   Net 1422.02 ml      Physical Exam  Constitutional:       Appearance: Normal appearance.   HENT:      Head: Normocephalic and atraumatic.      Mouth/Throat:      Mouth: Mucous membranes are moist.   Eyes:      Extraocular Movements: Extraocular movements intact.      Pupils: Pupils are equal, round, and reactive to light.   Cardiovascular:      Rate and Rhythm: Normal rate.      Pulses: Normal pulses.      Heart sounds: Normal heart sounds.   Pulmonary:      Effort: Pulmonary effort is normal.   Abdominal:      General: Abdomen is flat. Bowel sounds are normal.      Palpations: Abdomen is soft.   Musculoskeletal:      Right lower leg: No edema.      Left lower leg: No edema.   Skin:     General: Skin is warm and dry.   Neurological:      Mental Status: He is alert and oriented to  person, place, and time.   Psychiatric:         Behavior: Behavior normal.       Significant Labs: All pertinent labs within the past 24 hours have been reviewed.      Significant Imaging: I have reviewed all pertinent imaging results/findings within the past 24 hours.

## 2022-11-30 NOTE — ASSESSMENT & PLAN NOTE
Patient with Hypoxic Respiratory failure which is Acute.  he is not on home oxygen. Supplemental oxygen was provided and noted- Oxygen Concentration (%):  [35-98] 98.   Signs/symptoms of respiratory failure include- tachypnea and increased work of breathing. Contributing diagnoses includes - Pneumonia Labs and images were reviewed. Patient Has not had a recent ABG. Will treat underlying causes and adjust management of respiratory failure as follows.  - continue iv abx for pna  - duo nebs as needed  - wean o2 to keep sats > 90%

## 2022-11-30 NOTE — ASSESSMENT & PLAN NOTE
-S/P Thoracentesis which sow a > 30 l wbc   - Highly suspicious of empyema   -Cont IVAB  -F/U cx   -CTS consulted . S/p VATS =  Multiple loculated pockets of purulent fluid with fibrinous purulent adhesions.  Well developed rind over the left lower lobe.

## 2022-11-30 NOTE — ANESTHESIA POSTPROCEDURE EVALUATION
Anesthesia Post Evaluation    Patient: Zane Horne JrBenigno    Procedure(s) Performed: Procedure(s) (LRB):  VATS, WITH DECORTICATION, LUNG (Left)  BLOCK, NERVE, INTERCOSTAL, 2 OR MORE (Left)    Final Anesthesia Type: general      Patient location during evaluation: PACU  Patient participation: Yes- Able to Participate  Level of consciousness: awake  Post-procedure vital signs: reviewed and stable  Pain management: adequate  Airway patency: patent    PONV status at discharge: No PONV  Anesthetic complications: no      Cardiovascular status: hemodynamically stable  Respiratory status: unassisted  Hydration status: euvolemic  Follow-up not needed.          Vitals Value Taken Time   /80 11/30/22 1601   Temp 36.1 °C (97 °F) 11/30/22 1220   Pulse 93 11/30/22 1601   Resp 24 11/30/22 1601   SpO2 97 % 11/30/22 1601   Vitals shown include unvalidated device data.      Event Time   Out of Recovery 11/30/2022 13:15:00         Pain/Jovi Score: Pain Rating Prior to Med Admin: 9 (11/30/2022  3:52 PM)  Pain Rating Post Med Admin: 4 (11/29/2022  3:14 PM)  Jovi Score: 10 (11/30/2022  1:00 PM)

## 2022-11-30 NOTE — ANESTHESIA PREPROCEDURE EVALUATION
11/30/2022  Zane Horne Jr. is a 37 y.o., male.    Patient Active Problem List   Diagnosis    Acute respiratory failure with hypoxia    PNA (pneumonia)    Chest pain on breathing    Sepsis without acute organ dysfunction    Tobacco use     Loculated Paraneumonic left Pleural effusion     Pre-op Assessment    I have reviewed the Patient Summary Reports.    I have reviewed the NPO Status.   I have reviewed the Medications.     Review of Systems  Anesthesia Hx:  Denies Family Hx of Anesthesia complications.   Denies Personal Hx of Anesthesia complications.   Social:  Smoker    Hematology/Oncology:  Hematology Normal   Oncology Normal     EENT/Dental:EENT/Dental Normal   Cardiovascular:  Cardiovascular Normal  ECG has been reviewed.    Pulmonary:   Pneumonia Acute respiratory failure  BLL Pneumonia  Empyema    Chest CT     Dense consolidation seen throughout bilateral lower lobes.  Multiloculated left pleural effusion which is small to moderate.   Renal/:  Renal/ Normal     Hepatic/GI:  Hepatic/GI Normal    Musculoskeletal:  Musculoskeletal Normal    Neurological:  Neurology Normal    Endocrine:  Endocrine Normal    Dermatological:  Skin Normal    Psych:  Psychiatric Normal           Physical Exam  General: Alert and Oriented    Airway:  Mallampati: II   Mouth Opening: Normal  TM Distance: Normal  Tongue: Normal  Neck ROM: Normal ROM    Dental:Terrible dentition  Carious and broken teeth      Anesthesia Plan  Type of Anesthesia, risks & benefits discussed:    Anesthesia Type: Gen ETT  Intra-op Monitoring Plan: Standard ASA Monitors  Induction:  IV  Informed Consent: Informed consent signed with the Patient and all parties understand the risks and agree with anesthesia plan.  All questions answered.   ASA Score: 3  Day of Surgery Review of History & Physical: I have interviewed and examined the  patient. I have reviewed the patient's H&P dated:     Ready For Surgery From Anesthesia Perspective.     .

## 2022-11-30 NOTE — PLAN OF CARE
VSS overnight. Pt had one episode of emesis, and maintained a temp of 99 throughout the shift. Pt only intermittently wearing Venti mask, and refuses to keep on face, sats >95%. Fall and safety precautions in place. Call light within reach. POC reviewed with pt and spouse.

## 2022-12-01 LAB
ALBUMIN SERPL BCP-MCNC: 2.7 G/DL (ref 3.5–5.2)
ALP SERPL-CCNC: 145 U/L (ref 55–135)
ALT SERPL W/O P-5'-P-CCNC: 136 U/L (ref 10–44)
ANION GAP SERPL CALC-SCNC: 14 MMOL/L (ref 8–16)
AST SERPL-CCNC: 84 U/L (ref 10–40)
BASOPHILS # BLD AUTO: 0.03 K/UL (ref 0–0.2)
BASOPHILS NFR BLD: 0.2 % (ref 0–1.9)
BILIRUB DIRECT SERPL-MCNC: 0.6 MG/DL (ref 0.1–0.3)
BILIRUB SERPL-MCNC: 1.1 MG/DL (ref 0.1–1)
BUN SERPL-MCNC: 9 MG/DL (ref 6–20)
CALCIUM SERPL-MCNC: 8.2 MG/DL (ref 8.7–10.5)
CHLORIDE SERPL-SCNC: 102 MMOL/L (ref 95–110)
CO2 SERPL-SCNC: 20 MMOL/L (ref 23–29)
CREAT SERPL-MCNC: 0.8 MG/DL (ref 0.5–1.4)
DIFFERENTIAL METHOD: ABNORMAL
EOSINOPHIL # BLD AUTO: 0.1 K/UL (ref 0–0.5)
EOSINOPHIL NFR BLD: 1 % (ref 0–8)
ERYTHROCYTE [DISTWIDTH] IN BLOOD BY AUTOMATED COUNT: 12.4 % (ref 11.5–14.5)
EST. GFR  (NO RACE VARIABLE): >60 ML/MIN/1.73 M^2
GLUCOSE FLD-MCNC: 37 MG/DL
GLUCOSE SERPL-MCNC: 119 MG/DL (ref 70–110)
HCT VFR BLD AUTO: 35.4 % (ref 40–54)
HGB BLD-MCNC: 12 G/DL (ref 14–18)
IMM GRANULOCYTES # BLD AUTO: 0.07 K/UL (ref 0–0.04)
IMM GRANULOCYTES NFR BLD AUTO: 0.5 % (ref 0–0.5)
LACTATE DEHYDROGENASE FLUID: 1256 U/L
LYMPHOCYTES # BLD AUTO: 1.8 K/UL (ref 1–4.8)
LYMPHOCYTES NFR BLD: 13.3 % (ref 18–48)
MAGNESIUM SERPL-MCNC: 2.1 MG/DL (ref 1.6–2.6)
MCH RBC QN AUTO: 27.3 PG (ref 27–31)
MCHC RBC AUTO-ENTMCNC: 33.9 G/DL (ref 32–36)
MCV RBC AUTO: 81 FL (ref 82–98)
MONOCYTES # BLD AUTO: 0.9 K/UL (ref 0.3–1)
MONOCYTES NFR BLD: 6.6 % (ref 4–15)
NEUTROPHILS # BLD AUTO: 10.6 K/UL (ref 1.8–7.7)
NEUTROPHILS NFR BLD: 78.4 % (ref 38–73)
NRBC BLD-RTO: 0 /100 WBC
PLATELET # BLD AUTO: 336 K/UL (ref 150–450)
PMV BLD AUTO: 10.3 FL (ref 9.2–12.9)
POTASSIUM SERPL-SCNC: 4 MMOL/L (ref 3.5–5.1)
PROCALCITONIN SERPL IA-MCNC: 0.08 NG/ML
PROT FLD-MCNC: 5.8 G/DL
PROT SERPL-MCNC: 6.5 G/DL (ref 6–8.4)
RBC # BLD AUTO: 4.4 M/UL (ref 4.6–6.2)
SODIUM SERPL-SCNC: 136 MMOL/L (ref 136–145)
WBC # BLD AUTO: 13.58 K/UL (ref 3.9–12.7)

## 2022-12-01 PROCEDURE — 36415 COLL VENOUS BLD VENIPUNCTURE: CPT | Performed by: INTERNAL MEDICINE

## 2022-12-01 PROCEDURE — 25000242 PHARM REV CODE 250 ALT 637 W/ HCPCS: Performed by: THORACIC SURGERY (CARDIOTHORACIC VASCULAR SURGERY)

## 2022-12-01 PROCEDURE — 63600175 PHARM REV CODE 636 W HCPCS: Performed by: NURSE PRACTITIONER

## 2022-12-01 PROCEDURE — 84145 PROCALCITONIN (PCT): CPT | Performed by: INTERNAL MEDICINE

## 2022-12-01 PROCEDURE — 97116 GAIT TRAINING THERAPY: CPT

## 2022-12-01 PROCEDURE — 80048 BASIC METABOLIC PNL TOTAL CA: CPT | Performed by: THORACIC SURGERY (CARDIOTHORACIC VASCULAR SURGERY)

## 2022-12-01 PROCEDURE — 80076 HEPATIC FUNCTION PANEL: CPT | Performed by: THORACIC SURGERY (CARDIOTHORACIC VASCULAR SURGERY)

## 2022-12-01 PROCEDURE — 83735 ASSAY OF MAGNESIUM: CPT | Performed by: THORACIC SURGERY (CARDIOTHORACIC VASCULAR SURGERY)

## 2022-12-01 PROCEDURE — 99900035 HC TECH TIME PER 15 MIN (STAT)

## 2022-12-01 PROCEDURE — 25000003 PHARM REV CODE 250: Performed by: INTERNAL MEDICINE

## 2022-12-01 PROCEDURE — 25000003 PHARM REV CODE 250: Performed by: THORACIC SURGERY (CARDIOTHORACIC VASCULAR SURGERY)

## 2022-12-01 PROCEDURE — 94640 AIRWAY INHALATION TREATMENT: CPT

## 2022-12-01 PROCEDURE — 25000003 PHARM REV CODE 250: Performed by: NURSE PRACTITIONER

## 2022-12-01 PROCEDURE — 63600175 PHARM REV CODE 636 W HCPCS: Performed by: INTERNAL MEDICINE

## 2022-12-01 PROCEDURE — 20000000 HC ICU ROOM

## 2022-12-01 PROCEDURE — 97162 PT EVAL MOD COMPLEX 30 MIN: CPT

## 2022-12-01 PROCEDURE — 85025 COMPLETE CBC W/AUTO DIFF WBC: CPT | Performed by: THORACIC SURGERY (CARDIOTHORACIC VASCULAR SURGERY)

## 2022-12-01 PROCEDURE — 94761 N-INVAS EAR/PLS OXIMETRY MLT: CPT

## 2022-12-01 PROCEDURE — 63600175 PHARM REV CODE 636 W HCPCS: Performed by: THORACIC SURGERY (CARDIOTHORACIC VASCULAR SURGERY)

## 2022-12-01 RX ORDER — KETOROLAC TROMETHAMINE 30 MG/ML
15 INJECTION, SOLUTION INTRAMUSCULAR; INTRAVENOUS EVERY 8 HOURS
Status: COMPLETED | OUTPATIENT
Start: 2022-12-01 | End: 2022-12-03

## 2022-12-01 RX ADMIN — ONDANSETRON 8 MG: 8 TABLET, ORALLY DISINTEGRATING ORAL at 04:12

## 2022-12-01 RX ADMIN — IPRATROPIUM BROMIDE AND ALBUTEROL SULFATE 3 ML: 2.5; .5 SOLUTION RESPIRATORY (INHALATION) at 03:12

## 2022-12-01 RX ADMIN — PIPERACILLIN SODIUM AND TAZOBACTAM SODIUM 4.5 G: 4; .5 INJECTION, POWDER, LYOPHILIZED, FOR SOLUTION INTRAVENOUS at 06:12

## 2022-12-01 RX ADMIN — SODIUM CHLORIDE 3 G: 9 INJECTION, SOLUTION INTRAVENOUS at 08:12

## 2022-12-01 RX ADMIN — ACETAMINOPHEN 650 MG: 325 TABLET ORAL at 11:12

## 2022-12-01 RX ADMIN — Medication 6 MG: at 02:12

## 2022-12-01 RX ADMIN — ACETAMINOPHEN 650 MG: 325 TABLET ORAL at 04:12

## 2022-12-01 RX ADMIN — MORPHINE SULFATE 1 MG: 2 INJECTION, SOLUTION INTRAMUSCULAR; INTRAVENOUS at 09:12

## 2022-12-01 RX ADMIN — SERTRALINE HYDROCHLORIDE 50 MG: 50 TABLET ORAL at 08:12

## 2022-12-01 RX ADMIN — IPRATROPIUM BROMIDE AND ALBUTEROL SULFATE 3 ML: 2.5; .5 SOLUTION RESPIRATORY (INHALATION) at 07:12

## 2022-12-01 RX ADMIN — METOCLOPRAMIDE 5 MG: 5 INJECTION, SOLUTION INTRAMUSCULAR; INTRAVENOUS at 08:12

## 2022-12-01 RX ADMIN — KETOROLAC TROMETHAMINE 15 MG: 30 INJECTION, SOLUTION INTRAMUSCULAR; INTRAVENOUS at 01:12

## 2022-12-01 RX ADMIN — OXYCODONE HYDROCHLORIDE 5 MG: 5 TABLET ORAL at 08:12

## 2022-12-01 RX ADMIN — DOCUSATE SODIUM 100 MG: 100 CAPSULE, LIQUID FILLED ORAL at 08:12

## 2022-12-01 RX ADMIN — MUPIROCIN: 20 OINTMENT TOPICAL at 08:12

## 2022-12-01 RX ADMIN — IPRATROPIUM BROMIDE AND ALBUTEROL SULFATE 3 ML: 2.5; .5 SOLUTION RESPIRATORY (INHALATION) at 08:12

## 2022-12-01 RX ADMIN — OXYCODONE HYDROCHLORIDE 5 MG: 5 TABLET ORAL at 12:12

## 2022-12-01 RX ADMIN — MORPHINE SULFATE 1 MG: 2 INJECTION, SOLUTION INTRAMUSCULAR; INTRAVENOUS at 11:12

## 2022-12-01 RX ADMIN — KETOROLAC TROMETHAMINE 15 MG: 30 INJECTION, SOLUTION INTRAMUSCULAR; INTRAVENOUS at 09:12

## 2022-12-01 RX ADMIN — ONDANSETRON 8 MG: 8 TABLET, ORALLY DISINTEGRATING ORAL at 08:12

## 2022-12-01 RX ADMIN — ALPRAZOLAM 0.25 MG: 0.25 TABLET ORAL at 04:12

## 2022-12-01 RX ADMIN — Medication 6 MG: at 08:12

## 2022-12-01 RX ADMIN — SODIUM CHLORIDE 3 G: 9 INJECTION, SOLUTION INTRAVENOUS at 04:12

## 2022-12-01 RX ADMIN — MORPHINE SULFATE 1 MG: 2 INJECTION, SOLUTION INTRAMUSCULAR; INTRAVENOUS at 05:12

## 2022-12-01 RX ADMIN — POLYETHYLENE GLYCOL 3350 17 G: 17 POWDER, FOR SOLUTION ORAL at 09:12

## 2022-12-01 RX ADMIN — OXYCODONE HYDROCHLORIDE 5 MG: 5 TABLET ORAL at 02:12

## 2022-12-01 RX ADMIN — ACETAMINOPHEN 650 MG: 325 TABLET ORAL at 05:12

## 2022-12-01 RX ADMIN — OXYCODONE HYDROCHLORIDE 5 MG: 5 TABLET ORAL at 04:12

## 2022-12-01 NOTE — PROGRESS NOTES
[facility] - Intensive Care ([facility])  Pulmonology  Progress Note    Patient Name: Zane Horne Jr.  MRN: [id number]  Admission Date: 11/28/2022  Hospital Length of Stay: 3 days  Code Status: Full Code  Attending Provider: Marco Nguyễn, *  Primary Care Provider: Primary Doctor No   Principal Problem: Empyema    Subjective:     Interval History:    ROS complete and negative unless stated in the interval HPI      Objective:     Vital Signs (Most Recent):  Temp: 99.3 °F (37.4 °C) (12/01/22 0305)  Pulse: 91 (12/01/22 0808)  Resp: 20 (12/01/22 0812)  BP: (!) 149/85 (12/01/22 0400)  SpO2: 95 % (12/01/22 0808)   Vital Signs (24h Range):  Temp:  [97 °F (36.1 °C)-99.3 °F (37.4 °C)] 99.3 °F (37.4 °C)  Pulse:  [] 91  Resp:  [16-34] 20  SpO2:  [93 %-100 %] 95 %  BP: (122-153)/(70-87) 149/85  Arterial Line BP: (168)/(80) 168/80     Weight: 89 kg (196 lb 3.4 oz)  Body mass index is 24.52 kg/m².      Intake/Output Summary (Last 24 hours) at 12/1/2022 0856  Last data filed at 12/1/2022 0600  Gross per 24 hour   Intake 1957.98 ml   Output 1610 ml   Net 347.98 ml       Physical Exam  Constitutional:       General: He is awake. He is not in acute distress.     Appearance: He is well-developed.   HENT:      Mouth/Throat:      Dentition: Abnormal dentition. Dental caries present.   Cardiovascular:      Rate and Rhythm: Normal rate and regular rhythm.      Pulses:           Radial pulses are 2+ on the right side and 2+ on the left side.   Pulmonary:      Effort: Pulmonary effort is normal.      Comments: On RA, L CT in place  Abdominal:      General: There is no distension.      Palpations: Abdomen is soft.   Musculoskeletal:      Right lower leg: No edema.      Left lower leg: No edema.   Skin:     General: Skin is dry.   Neurological:      General: No focal deficit present.      Mental Status: He is alert.   Psychiatric:         Behavior: Behavior is cooperative.       Vents:  Oxygen Concentration (%): 98 (11/30/22  1230)    Lines/Drains/Airways       Drain  Duration                  Y Chest Tube 1 and 2 11/30/22 1143 1 Left Pleural 32 Fr. 2 Left Pleural 32 Fr. <1 day              Peripheral Intravenous Line  Duration                  Peripheral IV - Single Lumen 18 G Left Forearm -- days         Peripheral IV - Single Lumen 11/29/22 0035 20 G Anterior;Right Wrist 2 days                    Significant Labs:    CBC/Anemia Profile:  Recent Labs   Lab 12/01/22  0329   WBC 13.58*   HGB 12.0*   HCT 35.4*      MCV 81*   RDW 12.4        Chemistries:  Recent Labs   Lab 11/29/22  1642 11/30/22  0325 12/01/22  0329    134* 136   K 3.7 3.9 4.0    106 102   CO2 18* 18* 20*   BUN 12 11 9   CREATININE 0.8 0.7 0.8   CALCIUM 9.2 9.2 8.2*   MG 1.9  --  2.1       All pertinent labs within the past 24 hours have been reviewed.    Significant Imaging:  I have reviewed all pertinent imaging results/findings within the past 24 hours.      ABG  No results for input(s): PH, PO2, PCO2, HCO3, BE in the last 168 hours.  Assessment/Plan:     * Empyema  - s/p VATS 11/30, plans and chest tube mgmt per CT surgery  - MRSA swab and cultures neg for MRSA, vanc has been previously d/c'd  - continue zosyn   - tolerating RA  - PT/OT, OOB, and ambualte   - PRN pain meds per primary team      Tobacco use  - cessation education  - nicotine patch if needed     Acute respiratory failure with hypoxia  - resolved and tolerating RA      Pulmonary team will remain available. Please call if we can be of assistance or if questions should arise.       Forest Wan NP  Pulmonology  [facility] ([facility])

## 2022-12-01 NOTE — SUBJECTIVE & OBJECTIVE
Interval History:  Patient is postop day 1 status post left VATS procedure with evacuation of empyema and decortication.    ROS  Medications:  Continuous Infusions:   dextrose 5 % and 0.45 % NaCl with KCl 20 mEq 25 mL/hr at 12/01/22 0600     Scheduled Meds:   acetaminophen  650 mg Oral Q6H    albuterol-ipratropium  3 mL Nebulization Q6H WAKE    docusate sodium  100 mg Oral BID    ketorolac  15 mg Intravenous Q8H    mupirocin   Nasal BID    piperacillin-tazobactam (ZOSYN) IVPB  4.5 g Intravenous Q8H    polyethylene glycol  17 g Oral Daily    sertraline  50 mg Oral QHS    traZODone  50 mg Oral QHS     PRN Meds:albuterol-ipratropium, ALPRAZolam, diphenhydrAMINE, hydrOXYzine pamoate, melatonin, metoclopramide HCl, morphine, ondansetron, oxyCODONE, sodium chloride 0.9%, sodium chloride 0.9%     Objective:     Vital Signs (Most Recent):  Temp: 99.3 °F (37.4 °C) (12/01/22 0305)  Pulse: 91 (12/01/22 0808)  Resp: (!) 24 (12/01/22 0928)  BP: (!) 149/85 (12/01/22 0400)  SpO2: 95 % (12/01/22 0808)   Vital Signs (24h Range):  Temp:  [97 °F (36.1 °C)-99.3 °F (37.4 °C)] 99.3 °F (37.4 °C)  Pulse:  [] 91  Resp:  [16-34] 24  SpO2:  [93 %-100 %] 95 %  BP: (122-153)/(70-87) 149/85  Arterial Line BP: (168)/(80) 168/80     Weight: 89 kg (196 lb 3.4 oz)  Body mass index is 24.52 kg/m².    SpO2: 95 %  O2 Device (Oxygen Therapy): room air    Intake/Output - Last 3 Shifts         11/29 0700 11/30 0659 11/30 0700 12/01 0659 12/01 0700 12/02 0659    P.O. 240 240     I.V. (mL/kg)  194.7 (2.2)     IV Piggyback 782 1523.3     Total Intake(mL/kg) 1022 (11.5) 1958 (22)     Urine (mL/kg/hr)  1350 (0.6)     Stool       Chest Tube  260     Total Output  1610     Net +1022 +348            Urine Occurrence 4 x      Emesis Occurrence 1 x              Lines/Drains/Airways       Drain  Duration                  Y Chest Tube 1 and 2 11/30/22 1143 1 Left Pleural 32 Fr. 2 Left Pleural 32 Fr. <1 day              Peripheral Intravenous Line  Duration                   Peripheral IV - Single Lumen 18 G Left Forearm -- days         Peripheral IV - Single Lumen 11/29/22 0035 20 G Anterior;Right Wrist 2 days                    Physical Exam  Constitutional:       Appearance: Normal appearance.   HENT:      Head: Normocephalic and atraumatic.   Cardiovascular:      Rate and Rhythm: Normal rate and regular rhythm.      Heart sounds: Normal heart sounds.   Pulmonary:      Effort: Pulmonary effort is normal.   Abdominal:      General: Abdomen is flat.      Palpations: Abdomen is soft.   Musculoskeletal:      Right lower leg: No edema.      Left lower leg: No edema.   Skin:     General: Skin is warm and dry.   Neurological:      General: No focal deficit present.      Mental Status: He is alert and oriented to person, place, and time.   Psychiatric:         Behavior: Behavior normal.       Significant Labs:  All pertinent labs from the last 24 hours have been reviewed.    Significant Diagnostics:  I have reviewed all pertinent imaging results/findings within the past 24 hours.

## 2022-12-01 NOTE — HOSPITAL COURSE
12/01/2022   The patient is postop day 1 status post VATS procedure with evacuation of empyema and decortication.    12/02/2022   The patient is postop day 2 status post VATS procedure with evacuation of empyema and decortication.    12/03/2022   The patient is postop day 3 status post VATS procedure with evacuation of empyema and decortication.    12/04/2022   The patient is postop day 4 status post VATS procedure with evacuation of empyema and decortication.    12/05/2022   The patient is postop day 5 status post VATS procedure with evacuation of empyema and decortication.

## 2022-12-01 NOTE — PLAN OF CARE
EVAL AND TX COMPLETED: facilitated transfers with SPV. Ambulated 400 ft x 3 with SBA/CGA with no AD. Recommend HHPT

## 2022-12-01 NOTE — PROGRESS NOTES
[facility] - Intensive Care [facility])  Cardiothoracic Surgery  Progress Note    Patient Name: Zane Horne Jr.  MRN: [id number]  Admission Date: 11/28/2022  Hospital Length of Stay: 3 days  Code Status: Full Code   Attending Physician: Marco Nguyễn, *   Referring Provider: Self, Aaareferral  Principal Problem:Empyema            Subjective:     Post-Op Info:  Procedure(s) (LRB):  VATS, WITH DECORTICATION, LUNG (Left)  BLOCK, NERVE, INTERCOSTAL, 2 OR MORE (Left)   1 Day Post-Op     Interval History:  Patient is postop day 1 status post left VATS procedure with evacuation of empyema and decortication.    ROS  Medications:  Continuous Infusions:   dextrose 5 % and 0.45 % NaCl with KCl 20 mEq 25 mL/hr at 12/01/22 0600     Scheduled Meds:   acetaminophen  650 mg Oral Q6H    albuterol-ipratropium  3 mL Nebulization Q6H WAKE    docusate sodium  100 mg Oral BID    ketorolac  15 mg Intravenous Q8H    mupirocin   Nasal BID    piperacillin-tazobactam (ZOSYN) IVPB  4.5 g Intravenous Q8H    polyethylene glycol  17 g Oral Daily    sertraline  50 mg Oral QHS    traZODone  50 mg Oral QHS     PRN Meds:albuterol-ipratropium, ALPRAZolam, diphenhydrAMINE, hydrOXYzine pamoate, melatonin, metoclopramide HCl, morphine, ondansetron, oxyCODONE, sodium chloride 0.9%, sodium chloride 0.9%     Objective:     Vital Signs (Most Recent):  Temp: 99.3 °F (37.4 °C) (12/01/22 0305)  Pulse: 91 (12/01/22 0808)  Resp: (!) 24 (12/01/22 0928)  BP: (!) 149/85 (12/01/22 0400)  SpO2: 95 % (12/01/22 0808)   Vital Signs (24h Range):  Temp:  [97 °F (36.1 °C)-99.3 °F (37.4 °C)] 99.3 °F (37.4 °C)  Pulse:  [] 91  Resp:  [16-34] 24  SpO2:  [93 %-100 %] 95 %  BP: (122-153)/(70-87) 149/85  Arterial Line BP: (168)/(80) 168/80     Weight: 89 kg (196 lb 3.4 oz)  Body mass index is 24.52 kg/m².    SpO2: 95 %  O2 Device (Oxygen Therapy): room air    Intake/Output - Last 3 Shifts         11/29 0700  11/30 0659 11/30 0700  12/01 0659 12/01  0700  12/02 0659    P.O. 240 240     I.V. (mL/kg)  194.7 (2.2)     IV Piggyback 782 1523.3     Total Intake(mL/kg) 1022 (11.5) 1958 (22)     Urine (mL/kg/hr)  1350 (0.6)     Stool       Chest Tube  260     Total Output  1610     Net +1022 +348            Urine Occurrence 4 x      Emesis Occurrence 1 x              Lines/Drains/Airways       Drain  Duration                  Y Chest Tube 1 and 2 11/30/22 1143 1 Left Pleural 32 Fr. 2 Left Pleural 32 Fr. <1 day              Peripheral Intravenous Line  Duration                  Peripheral IV - Single Lumen 18 G Left Forearm -- days         Peripheral IV - Single Lumen 11/29/22 0035 20 G Anterior;Right Wrist 2 days                    Physical Exam  Constitutional:       Appearance: Normal appearance.   HENT:      Head: Normocephalic and atraumatic.   Cardiovascular:      Rate and Rhythm: Normal rate and regular rhythm.      Heart sounds: Normal heart sounds.   Pulmonary:      Effort: Pulmonary effort is normal.   Abdominal:      General: Abdomen is flat.      Palpations: Abdomen is soft.   Musculoskeletal:      Right lower leg: No edema.      Left lower leg: No edema.   Skin:     General: Skin is warm and dry.   Neurological:      General: No focal deficit present.      Mental Status: He is alert and oriented to person, place, and time.   Psychiatric:         Behavior: Behavior normal.       Significant Labs:  All pertinent labs from the last 24 hours have been reviewed.    Significant Diagnostics:  I have reviewed all pertinent imaging results/findings within the past 24 hours.    Assessment/Plan:     * Empyema  The patient is a 37-year-old male with history of tobacco abuse who presented with shortness of breath.  CT scan shows left lung consolidation and a loculated parapneumonic collection.  Thoracentesis shows that the pleural fluid has a turbid appearance and has a markedly elevated WBC at 62226.cu mm.    The the findings of  loculated pleural effusion on CT, in  the setting of consolidated left lower lobe, with an elevated WBC in turbid appearing fluid is compatible with a parapneumonic effusion most likely an empyema.  The patient is a candidate for a VATS procedure with evacuation of empyema.  The risks and benefits of the procedure was explained to the patient.  The patient understands the risks and benefits of surgery and has agreed to proceed with VATS with evacuation of empyema for 11/30/2022.    12/01/2022   The patient is postop day 1 status post left VATS procedure with evacuation of empyema and decortication.  Patient is complaining of significant pain at surgical site.  Will add Toradol to his pain management.  Continue chest tube to water seal.  Continue antibiotic treatment with pip-tazo.        Matt Martinez MD  Cardiothoracic Surgery  [facility] - Intensive Care ([facility])

## 2022-12-01 NOTE — SUBJECTIVE & OBJECTIVE
Interval History:    ROS complete and negative unless stated in the interval HPI      Objective:     Vital Signs (Most Recent):  Temp: 99.3 °F (37.4 °C) (12/01/22 0305)  Pulse: 91 (12/01/22 0808)  Resp: 20 (12/01/22 0812)  BP: (!) 149/85 (12/01/22 0400)  SpO2: 95 % (12/01/22 0808)   Vital Signs (24h Range):  Temp:  [97 °F (36.1 °C)-99.3 °F (37.4 °C)] 99.3 °F (37.4 °C)  Pulse:  [] 91  Resp:  [16-34] 20  SpO2:  [93 %-100 %] 95 %  BP: (122-153)/(70-87) 149/85  Arterial Line BP: (168)/(80) 168/80     Weight: 89 kg (196 lb 3.4 oz)  Body mass index is 24.52 kg/m².      Intake/Output Summary (Last 24 hours) at 12/1/2022 0856  Last data filed at 12/1/2022 0600  Gross per 24 hour   Intake 1957.98 ml   Output 1610 ml   Net 347.98 ml       Physical Exam  Constitutional:       General: He is awake. He is not in acute distress.     Appearance: He is well-developed.   HENT:      Mouth/Throat:      Dentition: Abnormal dentition. Dental caries present.   Cardiovascular:      Rate and Rhythm: Normal rate and regular rhythm.      Pulses:           Radial pulses are 2+ on the right side and 2+ on the left side.   Pulmonary:      Effort: Pulmonary effort is normal.      Comments: On RA, L CT in place  Abdominal:      General: There is no distension.      Palpations: Abdomen is soft.   Musculoskeletal:      Right lower leg: No edema.      Left lower leg: No edema.   Skin:     General: Skin is dry.   Neurological:      General: No focal deficit present.      Mental Status: He is alert.   Psychiatric:         Behavior: Behavior is cooperative.       Vents:  Oxygen Concentration (%): 98 (11/30/22 1230)    Lines/Drains/Airways       Drain  Duration                  Y Chest Tube 1 and 2 11/30/22 1143 1 Left Pleural 32 Fr. 2 Left Pleural 32 Fr. <1 day              Peripheral Intravenous Line  Duration                  Peripheral IV - Single Lumen 18 G Left Forearm -- days         Peripheral IV - Single Lumen 11/29/22 0035 20 G  Anterior;Right Wrist 2 days                    Significant Labs:    CBC/Anemia Profile:  Recent Labs   Lab 12/01/22  0329   WBC 13.58*   HGB 12.0*   HCT 35.4*      MCV 81*   RDW 12.4        Chemistries:  Recent Labs   Lab 11/29/22  1642 11/30/22  0325 12/01/22  0329    134* 136   K 3.7 3.9 4.0    106 102   CO2 18* 18* 20*   BUN 12 11 9   CREATININE 0.8 0.7 0.8   CALCIUM 9.2 9.2 8.2*   MG 1.9  --  2.1       All pertinent labs within the past 24 hours have been reviewed.    Significant Imaging:  I have reviewed all pertinent imaging results/findings within the past 24 hours.

## 2022-12-01 NOTE — SUBJECTIVE & OBJECTIVE
Interval History:     Review of Systems   Constitutional:  Positive for activity change.   HENT: Negative.     Eyes: Negative.    Respiratory:  Positive for cough and shortness of breath.    Cardiovascular:  Negative for chest pain.   Gastrointestinal: Negative.    Endocrine: Negative.    Genitourinary: Negative.    Musculoskeletal:  Positive for back pain.   Skin: Negative.    Allergic/Immunologic: Negative.    Neurological: Negative.    Hematological: Negative.    Psychiatric/Behavioral:  Negative for dysphoric mood. The patient is nervous/anxious.    Objective:     Vital Signs (Most Recent):  Temp: 98.1 °F (36.7 °C) (12/01/22 0701)  Pulse: 91 (12/01/22 0808)  Resp: 20 (12/01/22 1254)  BP: (!) 149/85 (12/01/22 0400)  SpO2: 95 % (12/01/22 0808)   Vital Signs (24h Range):  Temp:  [97.9 °F (36.6 °C)-99.3 °F (37.4 °C)] 98.1 °F (36.7 °C)  Pulse:  [] 91  Resp:  [16-34] 20  SpO2:  [95 %-98 %] 95 %  BP: (128-153)/(70-87) 149/85  Arterial Line BP: (168)/(80) 168/80     Weight: 89 kg (196 lb 3.4 oz)  Body mass index is 24.52 kg/m².    Intake/Output Summary (Last 24 hours) at 12/1/2022 1322  Last data filed at 12/1/2022 0600  Gross per 24 hour   Intake 957.98 ml   Output 1250 ml   Net -292.02 ml      Physical Exam    Significant Labs: All pertinent labs within the past 24 hours have been reviewed.    Results for orders placed or performed during the hospital encounter of 11/28/22   Influenza A & B by Molecular    Specimen: Nasopharyngeal Swab   Result Value Ref Range    Influenza A, Molecular Negative Negative    Influenza B, Molecular Negative Negative    Flu A & B Source NP    Blood culture #1 **CANNOT BE ORDERED STAT**    Specimen: Peripheral, Antecubital, Left; Blood   Result Value Ref Range    Blood Culture, Routine No Growth to date     Blood Culture, Routine No Growth to date     Blood Culture, Routine No Growth to date     Blood Culture, Routine No Growth to date    Blood culture #2 **CANNOT BE ORDERED STAT**     Specimen: Peripheral, Antecubital, Right; Blood   Result Value Ref Range    Blood Culture, Routine No Growth to date     Blood Culture, Routine No Growth to date     Blood Culture, Routine No Growth to date     Blood Culture, Routine No Growth to date    Culture, Body Fluid (Aerobic) w/ GS    Specimen: Pleural Fluid   Result Value Ref Range    AEROBIC CULTURE - FLUID No growth     Gram Stain Result Rare WBC's     Gram Stain Result No organisms seen    AFB Culture & Smear    Specimen: Pleural Fluid   Result Value Ref Range    AFB Culture & Smear Culture in progress     AFB CULTURE STAIN No acid fast bacilli seen.    KOH prep    Specimen: Pleural Fluid   Result Value Ref Range    KOH Prep No yeast or fungal elements seen    Culture, MRSA    Specimen: Nares, Right; MRSA source   Result Value Ref Range    MRSA Surveillance Screen No MRSA isolated    AFB Culture & Smear    Specimen: Pleural Fluid   Result Value Ref Range    AFB CULTURE STAIN No acid fast bacilli seen.    CBC auto differential   Result Value Ref Range    WBC 17.76 (H) 3.90 - 12.70 K/uL    RBC 4.93 4.60 - 6.20 M/uL    Hemoglobin 13.7 (L) 14.0 - 18.0 g/dL    Hematocrit 39.2 (L) 40.0 - 54.0 %    MCV 80 (L) 82 - 98 fL    MCH 27.8 27.0 - 31.0 pg    MCHC 34.9 32.0 - 36.0 g/dL    RDW 12.2 11.5 - 14.5 %    Platelets 287 150 - 450 K/uL    MPV 9.5 9.2 - 12.9 fL    Immature Granulocytes 0.4 0.0 - 0.5 %    Gran # (ANC) 15.7 (H) 1.8 - 7.7 K/uL    Immature Grans (Abs) 0.07 (H) 0.00 - 0.04 K/uL    Lymph # 0.7 (L) 1.0 - 4.8 K/uL    Mono # 1.2 (H) 0.3 - 1.0 K/uL    Eos # 0.0 0.0 - 0.5 K/uL    Baso # 0.03 0.00 - 0.20 K/uL    nRBC 0 0 /100 WBC    Gran % 88.3 (H) 38.0 - 73.0 %    Lymph % 4.1 (L) 18.0 - 48.0 %    Mono % 6.9 4.0 - 15.0 %    Eosinophil % 0.1 0.0 - 8.0 %    Basophil % 0.2 0.0 - 1.9 %    Differential Method Automated    Comprehensive metabolic panel   Result Value Ref Range    Sodium 136 136 - 145 mmol/L    Potassium 3.8 3.5 - 5.1 mmol/L    Chloride 104 95 -  110 mmol/L    CO2 17 (L) 23 - 29 mmol/L    Glucose 145 (H) 70 - 110 mg/dL    BUN 12 6 - 20 mg/dL    Creatinine 0.8 0.5 - 1.4 mg/dL    Calcium 9.9 8.7 - 10.5 mg/dL    Total Protein 8.3 6.0 - 8.4 g/dL    Albumin 3.1 (L) 3.5 - 5.2 g/dL    Total Bilirubin 3.4 (H) 0.1 - 1.0 mg/dL    Alkaline Phosphatase 128 55 - 135 U/L    AST 51 (H) 10 - 40 U/L    ALT 65 (H) 10 - 44 U/L    Anion Gap 15 8 - 16 mmol/L    eGFR >60 >60 mL/min/1.73 m^2   Brain natriuretic peptide   Result Value Ref Range    BNP <10 0 - 99 pg/mL   Troponin I   Result Value Ref Range    Troponin I <0.006 0.000 - 0.026 ng/mL   COVID-19 Rapid Screening   Result Value Ref Range    SARS-CoV-2 RNA, Amplification, Qual Negative Negative   Lactic acid, plasma   Result Value Ref Range    Lactate (Lactic Acid) 1.3 0.5 - 2.2 mmol/L   D dimer, quantitative   Result Value Ref Range    D-Dimer 2.68 (H) <0.50 mg/L FEU   Urinalysis, Reflex to Urine Culture Urine, Clean Catch    Specimen: Urine   Result Value Ref Range    Specimen UA Urine, Clean Catch     Color, UA Orange (A) Yellow, Straw, Christina    Appearance, UA Clear Clear    pH, UA 7.0 5.0 - 8.0    Specific Gravity, UA >1.030 (A) 1.005 - 1.030    Protein, UA 2+ (A) Negative    Glucose, UA Trace (A) Negative    Ketones, UA Negative Negative    Bilirubin (UA) 2+ (A) Negative    Occult Blood UA Negative Negative    Nitrite, UA Negative Negative    Urobilinogen, UA >=8.0 (A) <2.0 EU/dL    Leukocytes, UA Negative Negative   Urinalysis Microscopic   Result Value Ref Range    RBC, UA 2 0 - 4 /hpf    WBC, UA 2 0 - 5 /hpf    WBC Clumps, UA Rare None-Rare    Bacteria None None-Occ /hpf    Hyaline Casts, UA 0 0-1/lpf /lpf    Microscopic Comment SEE COMMENT    Protein, Body Fluid (Reference Lab or Non-[facility]) [facility]; Pleural Fluid, Left   Result Value Ref Range    Protein, Fluid 5.8 See Comment g/dL   LDH, Body Fluid (Reference [facility] or Non-[facility]) [facility]; Pleural Fluid, Left   Result Value Ref Range    LD, Fluid 1256 See Comment  U/L   Flow Cytometry Analysis (Body Fluid) Pleural Fluid   Result Value Ref Range    Fluid Type PLEURAL     Fluid Interpretation       No abnormal hematopoietic population is detected in this sample.  Correlate  clinically and with cytology.      Fluid Antibodies Analyzed       All analyzed: CD2, CD3, CD4, CD5, CD7, CD8, CD10, CD13, CD19, CD20, CD34,  , KAPPA, LAMBDA,CD45 and 7AAD.      Fluid Comment       Flow cytometric analysis of pleural fluid shows populations of polyclonal B  lymphocytes and T lymphocytes that are immunophenotypically unremarkable.  The blast gate is not increased.  Flow differential:  Lymphocytes 1.3%, Monocytes 12.6%, Granulocytes  85.1%,  Blast  0.4%, Debris/nRBC 0.1%,  Viability 99.4%.     Glucose, Body Fluid (Reference Lab or Non-[facility]) [facility]; Pleural Fluid, Left   Result Value Ref Range    Glucose, Fluid 37 See Comment mg/dL   WBC & Diff,Body Fluid Pleural Fluid, Left   Result Value Ref Range    Body Fluid Type Pleural Fluid, Left     Fluid Appearance Turbid     Fluid Color Christina     WBC, Body Fluid 33466 /cu mm    Segs, Fluid 30 %    Lymphs, Fluid 2 %    Monocytes/Macrophages, Fluid 9 %    Eos, Fluid 59 %   Comprehensive metabolic panel   Result Value Ref Range    Sodium 136 136 - 145 mmol/L    Potassium 3.6 3.5 - 5.1 mmol/L    Chloride 106 95 - 110 mmol/L    CO2 17 (L) 23 - 29 mmol/L    Glucose 126 (H) 70 - 110 mg/dL    BUN 14 6 - 20 mg/dL    Creatinine 0.8 0.5 - 1.4 mg/dL    Calcium 8.9 8.7 - 10.5 mg/dL    Total Protein 7.3 6.0 - 8.4 g/dL    Albumin 2.6 (L) 3.5 - 5.2 g/dL    Total Bilirubin 2.4 (H) 0.1 - 1.0 mg/dL    Alkaline Phosphatase 118 55 - 135 U/L    AST 57 (H) 10 - 40 U/L    ALT 63 (H) 10 - 44 U/L    Anion Gap 13 8 - 16 mmol/L    eGFR >60 >60 mL/min/1.73 m^2   CBC auto differential   Result Value Ref Range    WBC 16.95 (H) 3.90 - 12.70 K/uL    RBC 4.31 (L) 4.60 - 6.20 M/uL    Hemoglobin 11.7 (L) 14.0 - 18.0 g/dL    Hematocrit 34.8 (L) 40.0 - 54.0 %    MCV 81 (L) 82 -  98 fL    MCH 27.1 27.0 - 31.0 pg    MCHC 33.6 32.0 - 36.0 g/dL    RDW 12.1 11.5 - 14.5 %    Platelets 293 150 - 450 K/uL    MPV 10.3 9.2 - 12.9 fL    Immature Granulocytes 0.8 (H) 0.0 - 0.5 %    Gran # (ANC) 14.3 (H) 1.8 - 7.7 K/uL    Immature Grans (Abs) 0.13 (H) 0.00 - 0.04 K/uL    Lymph # 1.4 1.0 - 4.8 K/uL    Mono # 1.1 (H) 0.3 - 1.0 K/uL    Eos # 0.0 0.0 - 0.5 K/uL    Baso # 0.03 0.00 - 0.20 K/uL    nRBC 0 0 /100 WBC    Gran % 84.4 (H) 38.0 - 73.0 %    Lymph % 8.0 (L) 18.0 - 48.0 %    Mono % 6.5 4.0 - 15.0 %    Eosinophil % 0.1 0.0 - 8.0 %    Basophil % 0.2 0.0 - 1.9 %    Differential Method Automated    Pathologist Review of Laboratory Test   Result Value Ref Range    Pathologist Review, Body Fluid REVIEWED    Magnesium   Result Value Ref Range    Magnesium 1.9 1.6 - 2.6 mg/dL   Basic metabolic panel   Result Value Ref Range    Sodium 136 136 - 145 mmol/L    Potassium 3.7 3.5 - 5.1 mmol/L    Chloride 105 95 - 110 mmol/L    CO2 18 (L) 23 - 29 mmol/L    Glucose 100 70 - 110 mg/dL    BUN 12 6 - 20 mg/dL    Creatinine 0.8 0.5 - 1.4 mg/dL    Calcium 9.2 8.7 - 10.5 mg/dL    Anion Gap 13 8 - 16 mmol/L    eGFR >60 >60 mL/min/1.73 m^2   Drug screen panel, urine emergency   Result Value Ref Range    Benzodiazepines Negative Negative    Methadone metabolites Negative Negative    Cocaine (Metab.) Negative Negative    Opiate Scrn, Ur Presumptive Positive (A) Negative    Barbiturate Screen, Ur Negative Negative    Amphetamine Screen, Ur Negative Negative    THC Presumptive Positive (A) Negative    Phencyclidine Negative Negative    Creatinine, Urine 184.8 23.0 - 375.0 mg/dL    Toxicology Information SEE COMMENT    Protime-INR   Result Value Ref Range    Prothrombin Time 10.9 9.0 - 12.5 sec    INR 1.0 0.8 - 1.2   APTT   Result Value Ref Range    aPTT 30.0 21.0 - 32.0 sec   Hemoglobin A1c   Result Value Ref Range    Hemoglobin A1C 5.0 4.0 - 5.6 %    Estimated Avg Glucose 97 68 - 131 mg/dL   Prealbumin   Result Value Ref Range     Prealbumin 10 (L) 20 - 43 mg/dL   VANCOMYCIN, TROUGH   Result Value Ref Range    Vancomycin-Trough 7.4 (L) 10.0 - 22.0 ug/mL   Basic Metabolic Panel   Result Value Ref Range    Sodium 134 (L) 136 - 145 mmol/L    Potassium 3.9 3.5 - 5.1 mmol/L    Chloride 106 95 - 110 mmol/L    CO2 18 (L) 23 - 29 mmol/L    Glucose 97 70 - 110 mg/dL    BUN 11 6 - 20 mg/dL    Creatinine 0.7 0.5 - 1.4 mg/dL    Calcium 9.2 8.7 - 10.5 mg/dL    Anion Gap 10 8 - 16 mmol/L    eGFR >60 >60 mL/min/1.73 m^2   APTT   Result Value Ref Range    aPTT 28.4 21.0 - 32.0 sec   CBC auto differential   Result Value Ref Range    WBC 13.58 (H) 3.90 - 12.70 K/uL    RBC 4.40 (L) 4.60 - 6.20 M/uL    Hemoglobin 12.0 (L) 14.0 - 18.0 g/dL    Hematocrit 35.4 (L) 40.0 - 54.0 %    MCV 81 (L) 82 - 98 fL    MCH 27.3 27.0 - 31.0 pg    MCHC 33.9 32.0 - 36.0 g/dL    RDW 12.4 11.5 - 14.5 %    Platelets 336 150 - 450 K/uL    MPV 10.3 9.2 - 12.9 fL    Immature Granulocytes 0.5 0.0 - 0.5 %    Gran # (ANC) 10.6 (H) 1.8 - 7.7 K/uL    Immature Grans (Abs) 0.07 (H) 0.00 - 0.04 K/uL    Lymph # 1.8 1.0 - 4.8 K/uL    Mono # 0.9 0.3 - 1.0 K/uL    Eos # 0.1 0.0 - 0.5 K/uL    Baso # 0.03 0.00 - 0.20 K/uL    nRBC 0 0 /100 WBC    Gran % 78.4 (H) 38.0 - 73.0 %    Lymph % 13.3 (L) 18.0 - 48.0 %    Mono % 6.6 4.0 - 15.0 %    Eosinophil % 1.0 0.0 - 8.0 %    Basophil % 0.2 0.0 - 1.9 %    Differential Method Automated    Basic metabolic panel   Result Value Ref Range    Sodium 136 136 - 145 mmol/L    Potassium 4.0 3.5 - 5.1 mmol/L    Chloride 102 95 - 110 mmol/L    CO2 20 (L) 23 - 29 mmol/L    Glucose 119 (H) 70 - 110 mg/dL    BUN 9 6 - 20 mg/dL    Creatinine 0.8 0.5 - 1.4 mg/dL    Calcium 8.2 (L) 8.7 - 10.5 mg/dL    Anion Gap 14 8 - 16 mmol/L    eGFR >60 >60 mL/min/1.73 m^2   Magnesium   Result Value Ref Range    Magnesium 2.1 1.6 - 2.6 mg/dL   Procalcitonin   Result Value Ref Range    Procalcitonin 0.08 <0.25 ng/mL   Hepatic Function Panel   Result Value Ref Range    Total Protein 6.5  6.0 - 8.4 g/dL    Albumin 2.7 (L) 3.5 - 5.2 g/dL    Total Bilirubin 1.1 (H) 0.1 - 1.0 mg/dL    Bilirubin, Direct 0.6 (H) 0.1 - 0.3 mg/dL    AST 84 (H) 10 - 40 U/L     (H) 10 - 44 U/L    Alkaline Phosphatase 145 (H) 55 - 135 U/L   Type & Screen   Result Value Ref Range    Group & Rh O NEG     Indirect Rajendra NEG          Significant Imaging: I have reviewed all pertinent imaging results/findings within the past 24 hours.    X-Ray Chest AP Single View   Final Result      Slightly increased patchy infiltrate left lower lobe which could reflect airspace disease versus aspiration or pulmonary contusion.  Continued follow-up is recommended.         Electronically signed by: Zane Kingston MD   Date:    12/01/2022   Time:    06:59      X-Ray Chest 1 View   Final Result      No acute process seen.         Electronically signed by: Zane Kingston MD   Date:    11/30/2022   Time:    12:34      X-Ray Chest AP Portable   Final Result      In comparison to the prior study, there is no adverse interval changes         Electronically signed by: Zaen Kingston MD   Date:    11/28/2022   Time:    12:46      US Thoracentesis with Imaging, Aspiration Only   Final Result      Left-sided thoracentesis without evidence of immediate complication.         Electronically signed by: Zane Kingston MD   Date:    11/28/2022   Time:    12:45      US Abdomen Limited   Final Result      No acute abnormalities         Electronically signed by: Zane Kingston MD   Date:    11/28/2022   Time:    12:44      US Lower Extremity Veins Bilateral   Final Result      No evidence of deep venous thrombosis in either lower extremity.         Electronically signed by: Garrett Rodríguez   Date:    11/28/2022   Time:    09:40      CTA Chest Non-Coronary (PE Studies)   Final Result      No evidence of pulmonary embolism.  Limited bolus tracking.      Dense consolidation seen throughout bilateral lower lobes.   Multiloculated left pleural effusion which is small to  moderate.      See additional findings above      All CT scans at this facility use dose modulation, iterative reconstruction and/or weight based dosing when appropriate to reduce radiation dose to as low as reasonably achievable.         Electronically signed by: Zane Kingston MD   Date:    11/28/2022   Time:    09:02      X-ray Chest PA And Lateral   Final Result      Small to moderate left-sided pleural effusion with left basilar atelectasis or airspace disease.         Electronically signed by: Zane Kingston MD   Date:    11/28/2022   Time:    08:05      X-Ray Chest AP Portable   ED Interpretation   Left lower lobe infiltrate      Final Result      Small to moderate left-sided pleural effusion with left basilar atelectasis or airspace disease.         Electronically signed by: Zane Kingston MD   Date:    11/28/2022   Time:    07:58

## 2022-12-01 NOTE — ASSESSMENT & PLAN NOTE
- Etiology unknown .   (+) for Rotten  Teeth    With no sign of active infection .    -S/P Thoracentesis which sow a > 30 l wbc   -Cont IVAB  -Blood and fluid Cxs NGTD    -CTS consulted . S/p VATS =  Multiple loculated pockets of purulent fluid with fibrinous purulent adhesions.  Well developed rind over the left lower lobe.

## 2022-12-01 NOTE — ASSESSMENT & PLAN NOTE
- s/p VATS 11/30, plans and chest tube mgmt per CT surgery  - MRSA swab and cultures neg for MRSA, vanc has been previously d/c'd  - continue zosyn   - tolerating RA  - PT/OT, OOB, and ambualte   - PRN pain meds per primary team

## 2022-12-01 NOTE — PROGRESS NOTES
[facility] ([facility])  [facility] Medicine  Progress Note    Patient Name: Zane Horne Jr.  MRN: [id number]  Patient Class: IP- Inpatient   Admission Date: 11/28/2022  Length of Stay: 3 days  Attending Physician: Marco Nguyễn, *  Primary Care Provider: Primary Doctor No        Subjective:     Principal Problem:Empyema        HPI:     Mr. Horne is a 37 year old male with a history of depression and tobacco use (1 pack per day x 15 years) who presents to the ED with complaints of worsening shortness of breath x 3 days assocaited with productive cough and chest pain, symptoms are constant and moderate in nature, no relieving or exacerbating factors.  Upon arrival in the ED, patient tachypneic, tachycardiac, o2 sats mid 80's on room air.  Placed on 2 liters with noted improvement.  Lab work notable for WBC 17, bili 3.4,ALT 65, AST 51, normal troponin and BNP.  EKG showed ST.  Lactic pending.  Chest xray showed PNA.  Patient was given bolus, blood cultures drawn, started on abx and breathing treatments.  He will be admitted to hospital medicine service for acute hypoxic resp failure/sepsis d/t pna.     At assessment, patient lying in bed, o2 sats stable on 2 liters, complaints of yellow tinged productive cough and chest pain that is worse with movement, cough or deep breath. Of note, earlier last month patient did have a lap cr and has since followed up with his surgeon and has been doing well since that time.     Code Status, Full  Surrogate Decision Maker, wife, Mary      Overview/Hospital Course:  36 y/o WM admitted with a DX of PNA  and  Loculated parapneumonic effusion . Started on IV zosyn and vanc . S/P Thoracentesis  -  250 ccs of dark anibal fluid removed  . Pleural effusion cell diff show a wbc > 30 k .PH and glucose pleural fluid pending .  CTS consulted  and performed a VATS on 11/30 ( Multiple loculated pockets of purulent fluid with fibrinous purulent adhesions.  Well developed  rind over the left lower lobe) . The blood cx and Pleural effusion  cx are NGTD .       Interval History:     Review of Systems   Constitutional:  Positive for activity change.   HENT: Negative.     Eyes: Negative.    Respiratory:  Positive for cough and shortness of breath.    Cardiovascular:  Negative for chest pain.   Gastrointestinal: Negative.    Endocrine: Negative.    Genitourinary: Negative.    Musculoskeletal:  Positive for back pain.   Skin: Negative.    Allergic/Immunologic: Negative.    Neurological: Negative.    Hematological: Negative.    Psychiatric/Behavioral:  Negative for dysphoric mood. The patient is nervous/anxious.    Objective:     Vital Signs (Most Recent):  Temp: 98.1 °F (36.7 °C) (12/01/22 0701)  Pulse: 91 (12/01/22 0808)  Resp: 20 (12/01/22 1254)  BP: (!) 149/85 (12/01/22 0400)  SpO2: 95 % (12/01/22 0808)   Vital Signs (24h Range):  Temp:  [97.9 °F (36.6 °C)-99.3 °F (37.4 °C)] 98.1 °F (36.7 °C)  Pulse:  [] 91  Resp:  [16-34] 20  SpO2:  [95 %-98 %] 95 %  BP: (128-153)/(70-87) 149/85  Arterial Line BP: (168)/(80) 168/80      Physical Exam  Constitutional:       Appearance: Normal appearance.   HENT:      Head: Normocephalic and atraumatic.      Mouth/Throat:      Mouth: Mucous membranes are moist.   Eyes:      Extraocular Movements: Extraocular movements intact.      Pupils: Pupils are equal, round, and reactive to light.   Cardiovascular:      Rate and Rhythm: Normal rate.      Pulses: Normal pulses.      Heart sounds: Normal heart sounds.   Pulmonary:      Effort: Pulmonary effort is normal.      Comments: Left side chest tube in place   Abdominal:      General: Abdomen is flat. Bowel sounds are normal.      Palpations: Abdomen is soft.   Musculoskeletal:      Right lower leg: No edema.      Left lower leg: No edema.   Skin:     General: Skin is warm and dry.   Neurological:      Mental Status: He is alert and oriented to person, place, and time.   Psychiatric:         Behavior:  Behavior normal.   Weight: 89 kg (196 lb 3.4 oz)  Body mass index is 24.52 kg/m².    Intake/Output Summary (Last 24 hours) at 12/1/2022 1322  Last data filed at 12/1/2022 0600  Gross per 24 hour   Intake 957.98 ml   Output 1250 ml   Net -292.02 ml      Physical Exam    Significant Labs: All pertinent labs within the past 24 hours have been reviewed.    Results for orders placed or performed during the hospital encounter of 11/28/22   Influenza A & B by Molecular    Specimen: Nasopharyngeal Swab   Result Value Ref Range    Influenza A, Molecular Negative Negative    Influenza B, Molecular Negative Negative    Flu A & B Source NP    Blood culture #1 **CANNOT BE ORDERED STAT**    Specimen: Peripheral, Antecubital, Left; Blood   Result Value Ref Range    Blood Culture, Routine No Growth to date     Blood Culture, Routine No Growth to date     Blood Culture, Routine No Growth to date     Blood Culture, Routine No Growth to date    Blood culture #2 **CANNOT BE ORDERED STAT**    Specimen: Peripheral, Antecubital, Right; Blood   Result Value Ref Range    Blood Culture, Routine No Growth to date     Blood Culture, Routine No Growth to date     Blood Culture, Routine No Growth to date     Blood Culture, Routine No Growth to date    Culture, Body Fluid (Aerobic) w/ GS    Specimen: Pleural Fluid   Result Value Ref Range    AEROBIC CULTURE - FLUID No growth     Gram Stain Result Rare WBC's     Gram Stain Result No organisms seen    AFB Culture & Smear    Specimen: Pleural Fluid   Result Value Ref Range    AFB Culture & Smear Culture in progress     AFB CULTURE STAIN No acid fast bacilli seen.    KOH prep    Specimen: Pleural Fluid   Result Value Ref Range    KOH Prep No yeast or fungal elements seen    Culture, MRSA    Specimen: Nares, Right; MRSA source   Result Value Ref Range    MRSA Surveillance Screen No MRSA isolated    AFB Culture & Smear    Specimen: Pleural Fluid   Result Value Ref Range    AFB CULTURE STAIN No acid  fast bacilli seen.    CBC auto differential   Result Value Ref Range    WBC 17.76 (H) 3.90 - 12.70 K/uL    RBC 4.93 4.60 - 6.20 M/uL    Hemoglobin 13.7 (L) 14.0 - 18.0 g/dL    Hematocrit 39.2 (L) 40.0 - 54.0 %    MCV 80 (L) 82 - 98 fL    MCH 27.8 27.0 - 31.0 pg    MCHC 34.9 32.0 - 36.0 g/dL    RDW 12.2 11.5 - 14.5 %    Platelets 287 150 - 450 K/uL    MPV 9.5 9.2 - 12.9 fL    Immature Granulocytes 0.4 0.0 - 0.5 %    Gran # (ANC) 15.7 (H) 1.8 - 7.7 K/uL    Immature Grans (Abs) 0.07 (H) 0.00 - 0.04 K/uL    Lymph # 0.7 (L) 1.0 - 4.8 K/uL    Mono # 1.2 (H) 0.3 - 1.0 K/uL    Eos # 0.0 0.0 - 0.5 K/uL    Baso # 0.03 0.00 - 0.20 K/uL    nRBC 0 0 /100 WBC    Gran % 88.3 (H) 38.0 - 73.0 %    Lymph % 4.1 (L) 18.0 - 48.0 %    Mono % 6.9 4.0 - 15.0 %    Eosinophil % 0.1 0.0 - 8.0 %    Basophil % 0.2 0.0 - 1.9 %    Differential Method Automated    Comprehensive metabolic panel   Result Value Ref Range    Sodium 136 136 - 145 mmol/L    Potassium 3.8 3.5 - 5.1 mmol/L    Chloride 104 95 - 110 mmol/L    CO2 17 (L) 23 - 29 mmol/L    Glucose 145 (H) 70 - 110 mg/dL    BUN 12 6 - 20 mg/dL    Creatinine 0.8 0.5 - 1.4 mg/dL    Calcium 9.9 8.7 - 10.5 mg/dL    Total Protein 8.3 6.0 - 8.4 g/dL    Albumin 3.1 (L) 3.5 - 5.2 g/dL    Total Bilirubin 3.4 (H) 0.1 - 1.0 mg/dL    Alkaline Phosphatase 128 55 - 135 U/L    AST 51 (H) 10 - 40 U/L    ALT 65 (H) 10 - 44 U/L    Anion Gap 15 8 - 16 mmol/L    eGFR >60 >60 mL/min/1.73 m^2   Brain natriuretic peptide   Result Value Ref Range    BNP <10 0 - 99 pg/mL   Troponin I   Result Value Ref Range    Troponin I <0.006 0.000 - 0.026 ng/mL   COVID-19 Rapid Screening   Result Value Ref Range    SARS-CoV-2 RNA, Amplification, Qual Negative Negative   Lactic acid, plasma   Result Value Ref Range    Lactate (Lactic Acid) 1.3 0.5 - 2.2 mmol/L   D dimer, quantitative   Result Value Ref Range    D-Dimer 2.68 (H) <0.50 mg/L FEU   Urinalysis, Reflex to Urine Culture Urine, Clean Catch    Specimen: Urine   Result Value  Ref Range    Specimen UA Urine, Clean Catch     Color, UA Orange (A) Yellow, Straw, Christina    Appearance, UA Clear Clear    pH, UA 7.0 5.0 - 8.0    Specific Gravity, UA >1.030 (A) 1.005 - 1.030    Protein, UA 2+ (A) Negative    Glucose, UA Trace (A) Negative    Ketones, UA Negative Negative    Bilirubin (UA) 2+ (A) Negative    Occult Blood UA Negative Negative    Nitrite, UA Negative Negative    Urobilinogen, UA >=8.0 (A) <2.0 EU/dL    Leukocytes, UA Negative Negative   Urinalysis Microscopic   Result Value Ref Range    RBC, UA 2 0 - 4 /hpf    WBC, UA 2 0 - 5 /hpf    WBC Clumps, UA Rare None-Rare    Bacteria None None-Occ /hpf    Hyaline Casts, UA 0 0-1/lpf /lpf    Microscopic Comment SEE COMMENT    Protein, Body Fluid (Reference Lab or Non-[facility]) [facility]; Pleural Fluid, Left   Result Value Ref Range    Protein, Fluid 5.8 See Comment g/dL   LDH, Body Fluid (Reference [facility] or Non-[facility]) [facility]; Pleural Fluid, Left   Result Value Ref Range    LD, Fluid 1256 See Comment U/L   Flow Cytometry Analysis (Body Fluid) Pleural Fluid   Result Value Ref Range    Fluid Type PLEURAL     Fluid Interpretation       No abnormal hematopoietic population is detected in this sample.  Correlate  clinically and with cytology.      Fluid Antibodies Analyzed       All analyzed: CD2, CD3, CD4, CD5, CD7, CD8, CD10, CD13, CD19, CD20, CD34,  , KAPPA, LAMBDA,CD45 and 7AAD.      Fluid Comment       Flow cytometric analysis of pleural fluid shows populations of polyclonal B  lymphocytes and T lymphocytes that are immunophenotypically unremarkable.  The blast gate is not increased.  Flow differential:  Lymphocytes 1.3%, Monocytes 12.6%, Granulocytes  85.1%,  Blast  0.4%, Debris/nRBC 0.1%,  Viability 99.4%.     Glucose, Body Fluid (Reference [facility] or Non-[facility]) [facility]; Pleural Fluid, Left   Result Value Ref Range    Glucose, Fluid 37 See Comment mg/dL   WBC & Diff,Body Fluid Pleural Fluid, Left   Result Value Ref Range    Body Fluid Type  Pleural Fluid, Left     Fluid Appearance Turbid     Fluid Color Christina     WBC, Body Fluid 42542 /cu mm    Segs, Fluid 30 %    Lymphs, Fluid 2 %    Monocytes/Macrophages, Fluid 9 %    Eos, Fluid 59 %   Comprehensive metabolic panel   Result Value Ref Range    Sodium 136 136 - 145 mmol/L    Potassium 3.6 3.5 - 5.1 mmol/L    Chloride 106 95 - 110 mmol/L    CO2 17 (L) 23 - 29 mmol/L    Glucose 126 (H) 70 - 110 mg/dL    BUN 14 6 - 20 mg/dL    Creatinine 0.8 0.5 - 1.4 mg/dL    Calcium 8.9 8.7 - 10.5 mg/dL    Total Protein 7.3 6.0 - 8.4 g/dL    Albumin 2.6 (L) 3.5 - 5.2 g/dL    Total Bilirubin 2.4 (H) 0.1 - 1.0 mg/dL    Alkaline Phosphatase 118 55 - 135 U/L    AST 57 (H) 10 - 40 U/L    ALT 63 (H) 10 - 44 U/L    Anion Gap 13 8 - 16 mmol/L    eGFR >60 >60 mL/min/1.73 m^2   CBC auto differential   Result Value Ref Range    WBC 16.95 (H) 3.90 - 12.70 K/uL    RBC 4.31 (L) 4.60 - 6.20 M/uL    Hemoglobin 11.7 (L) 14.0 - 18.0 g/dL    Hematocrit 34.8 (L) 40.0 - 54.0 %    MCV 81 (L) 82 - 98 fL    MCH 27.1 27.0 - 31.0 pg    MCHC 33.6 32.0 - 36.0 g/dL    RDW 12.1 11.5 - 14.5 %    Platelets 293 150 - 450 K/uL    MPV 10.3 9.2 - 12.9 fL    Immature Granulocytes 0.8 (H) 0.0 - 0.5 %    Gran # (ANC) 14.3 (H) 1.8 - 7.7 K/uL    Immature Grans (Abs) 0.13 (H) 0.00 - 0.04 K/uL    Lymph # 1.4 1.0 - 4.8 K/uL    Mono # 1.1 (H) 0.3 - 1.0 K/uL    Eos # 0.0 0.0 - 0.5 K/uL    Baso # 0.03 0.00 - 0.20 K/uL    nRBC 0 0 /100 WBC    Gran % 84.4 (H) 38.0 - 73.0 %    Lymph % 8.0 (L) 18.0 - 48.0 %    Mono % 6.5 4.0 - 15.0 %    Eosinophil % 0.1 0.0 - 8.0 %    Basophil % 0.2 0.0 - 1.9 %    Differential Method Automated    Pathologist Review of Laboratory Test   Result Value Ref Range    Pathologist Review, Body Fluid REVIEWED    Magnesium   Result Value Ref Range    Magnesium 1.9 1.6 - 2.6 mg/dL   Basic metabolic panel   Result Value Ref Range    Sodium 136 136 - 145 mmol/L    Potassium 3.7 3.5 - 5.1 mmol/L    Chloride 105 95 - 110 mmol/L    CO2 18 (L) 23 - 29  mmol/L    Glucose 100 70 - 110 mg/dL    BUN 12 6 - 20 mg/dL    Creatinine 0.8 0.5 - 1.4 mg/dL    Calcium 9.2 8.7 - 10.5 mg/dL    Anion Gap 13 8 - 16 mmol/L    eGFR >60 >60 mL/min/1.73 m^2   Drug screen panel, urine emergency   Result Value Ref Range    Benzodiazepines Negative Negative    Methadone metabolites Negative Negative    Cocaine (Metab.) Negative Negative    Opiate Scrn, Ur Presumptive Positive (A) Negative    Barbiturate Screen, Ur Negative Negative    Amphetamine Screen, Ur Negative Negative    THC Presumptive Positive (A) Negative    Phencyclidine Negative Negative    Creatinine, Urine 184.8 23.0 - 375.0 mg/dL    Toxicology Information SEE COMMENT    Protime-INR   Result Value Ref Range    Prothrombin Time 10.9 9.0 - 12.5 sec    INR 1.0 0.8 - 1.2   APTT   Result Value Ref Range    aPTT 30.0 21.0 - 32.0 sec   Hemoglobin A1c   Result Value Ref Range    Hemoglobin A1C 5.0 4.0 - 5.6 %    Estimated Avg Glucose 97 68 - 131 mg/dL   Prealbumin   Result Value Ref Range    Prealbumin 10 (L) 20 - 43 mg/dL   VANCOMYCIN, TROUGH   Result Value Ref Range    Vancomycin-Trough 7.4 (L) 10.0 - 22.0 ug/mL   Basic Metabolic Panel   Result Value Ref Range    Sodium 134 (L) 136 - 145 mmol/L    Potassium 3.9 3.5 - 5.1 mmol/L    Chloride 106 95 - 110 mmol/L    CO2 18 (L) 23 - 29 mmol/L    Glucose 97 70 - 110 mg/dL    BUN 11 6 - 20 mg/dL    Creatinine 0.7 0.5 - 1.4 mg/dL    Calcium 9.2 8.7 - 10.5 mg/dL    Anion Gap 10 8 - 16 mmol/L    eGFR >60 >60 mL/min/1.73 m^2   APTT   Result Value Ref Range    aPTT 28.4 21.0 - 32.0 sec   CBC auto differential   Result Value Ref Range    WBC 13.58 (H) 3.90 - 12.70 K/uL    RBC 4.40 (L) 4.60 - 6.20 M/uL    Hemoglobin 12.0 (L) 14.0 - 18.0 g/dL    Hematocrit 35.4 (L) 40.0 - 54.0 %    MCV 81 (L) 82 - 98 fL    MCH 27.3 27.0 - 31.0 pg    MCHC 33.9 32.0 - 36.0 g/dL    RDW 12.4 11.5 - 14.5 %    Platelets 336 150 - 450 K/uL    MPV 10.3 9.2 - 12.9 fL    Immature Granulocytes 0.5 0.0 - 0.5 %    Gran #  (ANC) 10.6 (H) 1.8 - 7.7 K/uL    Immature Grans (Abs) 0.07 (H) 0.00 - 0.04 K/uL    Lymph # 1.8 1.0 - 4.8 K/uL    Mono # 0.9 0.3 - 1.0 K/uL    Eos # 0.1 0.0 - 0.5 K/uL    Baso # 0.03 0.00 - 0.20 K/uL    nRBC 0 0 /100 WBC    Gran % 78.4 (H) 38.0 - 73.0 %    Lymph % 13.3 (L) 18.0 - 48.0 %    Mono % 6.6 4.0 - 15.0 %    Eosinophil % 1.0 0.0 - 8.0 %    Basophil % 0.2 0.0 - 1.9 %    Differential Method Automated    Basic metabolic panel   Result Value Ref Range    Sodium 136 136 - 145 mmol/L    Potassium 4.0 3.5 - 5.1 mmol/L    Chloride 102 95 - 110 mmol/L    CO2 20 (L) 23 - 29 mmol/L    Glucose 119 (H) 70 - 110 mg/dL    BUN 9 6 - 20 mg/dL    Creatinine 0.8 0.5 - 1.4 mg/dL    Calcium 8.2 (L) 8.7 - 10.5 mg/dL    Anion Gap 14 8 - 16 mmol/L    eGFR >60 >60 mL/min/1.73 m^2   Magnesium   Result Value Ref Range    Magnesium 2.1 1.6 - 2.6 mg/dL   Procalcitonin   Result Value Ref Range    Procalcitonin 0.08 <0.25 ng/mL   Hepatic Function Panel   Result Value Ref Range    Total Protein 6.5 6.0 - 8.4 g/dL    Albumin 2.7 (L) 3.5 - 5.2 g/dL    Total Bilirubin 1.1 (H) 0.1 - 1.0 mg/dL    Bilirubin, Direct 0.6 (H) 0.1 - 0.3 mg/dL    AST 84 (H) 10 - 40 U/L     (H) 10 - 44 U/L    Alkaline Phosphatase 145 (H) 55 - 135 U/L   Type & Screen   Result Value Ref Range    Group & Rh O NEG     Indirect Rajendra NEG          Significant Imaging: I have reviewed all pertinent imaging results/findings within the past 24 hours.    X-Ray Chest AP Single View   Final Result      Slightly increased patchy infiltrate left lower lobe which could reflect airspace disease versus aspiration or pulmonary contusion.  Continued follow-up is recommended.         Electronically signed by: Zane Kingston MD   Date:    12/01/2022   Time:    06:59      X-Ray Chest 1 View   Final Result      No acute process seen.         Electronically signed by: Zane Kingston MD   Date:    11/30/2022   Time:    12:34      X-Ray Chest AP Portable   Final Result      In comparison  to the prior study, there is no adverse interval changes         Electronically signed by: Zane Kingston MD   Date:    11/28/2022   Time:    12:46      US Thoracentesis with Imaging, Aspiration Only   Final Result      Left-sided thoracentesis without evidence of immediate complication.         Electronically signed by: Zane Kingston MD   Date:    11/28/2022   Time:    12:45      US Abdomen Limited   Final Result      No acute abnormalities         Electronically signed by: Zane Kingston MD   Date:    11/28/2022   Time:    12:44      US Lower Extremity Veins Bilateral   Final Result      No evidence of deep venous thrombosis in either lower extremity.         Electronically signed by: Garrett Rodríguez   Date:    11/28/2022   Time:    09:40      CTA Chest Non-Coronary (PE Studies)   Final Result      No evidence of pulmonary embolism.  Limited bolus tracking.      Dense consolidation seen throughout bilateral lower lobes.   Multiloculated left pleural effusion which is small to moderate.      See additional findings above      All CT scans at this facility use dose modulation, iterative reconstruction and/or weight based dosing when appropriate to reduce radiation dose to as low as reasonably achievable.         Electronically signed by: Zane Kingston MD   Date:    11/28/2022   Time:    09:02      X-ray Chest PA And Lateral   Final Result      Small to moderate left-sided pleural effusion with left basilar atelectasis or airspace disease.         Electronically signed by: Zane Kingston MD   Date:    11/28/2022   Time:    08:05      X-Ray Chest AP Portable   ED Interpretation   Left lower lobe infiltrate      Final Result      Small to moderate left-sided pleural effusion with left basilar atelectasis or airspace disease.         Electronically signed by: Zane Kingston MD   Date:    11/28/2022   Time:    07:58              Assessment/Plan:      * Empyema  - Etiology unknown .   (+) for Rotten  Teeth    With no sign of  active infection .    -S/P Thoracentesis which sow a > 30 l wbc   -Cont IVAB  -Blood and fluid Cxs NGTD    -CTS consulted . S/p VATS =  Multiple loculated pockets of purulent fluid with fibrinous purulent adhesions.  Well developed rind over the left lower lobe.      Tobacco use  -1 pack per day x 15 years approx  -decline nicotine patch  counseled on cessation      Sepsis without acute organ dysfunction  2/2 to PNA an empyema  Cont IVAB  Blood and pleural fluid cx NGTD        Chest pain on breathing  - likely pleuritic in nature, worse with cough/deep breath  - tend troponin, have been negative thus far  - po pain medication prn      PNA (pneumonia)  - IV abx   -Loculated left pleural effusion   -S/P VATS       Acute respiratory failure with hypoxia  Patient with Hypoxic Respiratory failure which is Acute.  he is not on home oxygen. Supplemental oxygen was provided and noted-  .   Signs/symptoms of respiratory failure include- tachypnea and increased work of breathing. Contributing diagnoses includes - Pneumonia Labs and images were reviewed. Patient Has not had a recent ABG. Will treat underlying causes and adjust management of respiratory failure as follows.  - continue iv abx for pna  - duo nebs as needed  - wean o2 to keep sats > 90%        VTE Risk Mitigation (From admission, onward)           Ordered     IP VTE LOW RISK PATIENT  Once         11/29/22 1917     Place SILVINO hose  Until discontinued         11/29/22 1917     Place sequential compression device  Until discontinued         11/28/22 0424                    Discharge Planning   IVY:      Code Status: Full Code   Is the patient medically ready for discharge?:     Reason for patient still in hospital (select all that apply): Treatment  Discharge Plan A: Home, Home with family            .      Marco Nguyễn MD  Department of Hospital Medicine   [facility] (Hospital)

## 2022-12-01 NOTE — NURSING
Patient remained safety and fall prevention measures are in place. Please refer to assessment flowsheets for more details. Current vital signs are in stable. Plan of care discussed with the patient and spouse. Verbalized understanding. Chest tubes remained patent and intact.

## 2022-12-01 NOTE — PT/OT/SLP EVAL
Physical Therapy Evaluation    Patient Name:  Zane Horne Jr.   MRN:  [id number]    Recommendations:     Discharge Recommendations:  home health PT   Discharge Equipment Recommendations: none   Barriers to discharge: None    Assessment:     Zane Horne Jr. is a 37 y.o. male admitted with a medical diagnosis of Empyema.  He presents with the following impairments/functional limitations:  weakness, impaired endurance, gait instability, decreased safety awareness, pain which limits safe functional mobility, and increases risk of falls.    Rehab Prognosis: Good; patient would benefit from acute skilled PT services to address these deficits and reach maximum level of function.    Recent Surgery: Procedure(s) (LRB):  VATS, WITH DECORTICATION, LUNG (Left)  BLOCK, NERVE, INTERCOSTAL, 2 OR MORE (Left) 1 Day Post-Op    Plan:     During this hospitalization, patient to be seen 3 x/week to address the identified rehab impairments via gait training, therapeutic activities, therapeutic exercises, neuromuscular re-education and progress toward the following goals:    Plan of Care Expires:  12/15/22    Subjective     Chief Complaint: chest pain  Patient/Family Comments/goals:   Pain/Comfort:  Pain Rating 1: 7/10 (at chest tube site)  Pain Addressed 1: Reposition, Distraction    Patients cultural, spiritual, Jain conflicts given the current situation: no    Living Environment:  Pt lives in [facility] with 4 steps at entry and bilateral hand rails  Prior to admission, patients level of function was independent and works as a .  Equipment used at home: none.  DME owned (not currently used): none.  Upon discharge, patient will have assistance from family.    Objective:     Communicated with nursing (Irineo) prior to session.  Patient found up in chair with peripheral IV, telemetry, chest tube, blood pressure cuff, pulse ox (continuous)  upon PT entry to room.    General Precautions: Standard, fall    Orthopedic Precautions:N/A   Braces: N/A  Respiratory Status: Room air    Exams:  Cognitive Exam:  Patient is oriented to Person, Place, Time, and Situation  RLE ROM: WFL  RLE Strength: WFL  LLE ROM: WFL  LLE Strength: WFL    Functional Mobility:  Transfers:     Sit to Stand:  supervision with hand-held assist  Bed to Chair: supervision with  no AD  using  Stand Pivot  Gait: gait training 400ft x 3 with SPV and no AD, demonstrates slight unsteadiness on feet, slow pace, decreased stride length overall.      AM-PAC 6 CLICK MOBILITY  Total Score:16       Treatment & Education:  Educated pt on benefits of consistent participation in PT services to meet functional goals. Educated pt on supine/seated therex to promote strength and joint mobility. Exercises included AP, LAQ, marching x 10-15 reps. Educated to perform exercises intermittently throughout day to tolerance. Educated pt on importance of sitting OOB to promote endurance and overall activity tolerance. Pt expressed understanding. Educated pt on call don't fall policy and use of call button to alert nursing staff of needs.      Patient left up in chair with all lines intact, call button in reach, nursing notified, and nursing present.    GOALS:   Multidisciplinary Problems       Physical Therapy Goals          Problem: Physical Therapy    Goal Priority Disciplines Outcome Goal Variances Interventions   Physical Therapy Goal     PT, PT/OT      Description: Pt will perform bed mobility independently in order to participate in EOB activity.  Pt will perform transfers independently in order to participate in OOB activity.   Pt will ambulate 150ft mod I with LRAD in order to participate in daily tasks.                         History:     Past Medical History:   Diagnosis Date    Back pain     Bulging disc     L5       Past Surgical History:   Procedure Laterality Date    INJECTION OF ANESTHETIC AGENT AROUND MULTIPLE INTERCOSTAL NERVES Left 11/30/2022    Procedure:  BLOCK, NERVE, INTERCOSTAL, 2 OR MORE;  Surgeon: Matt Martinez MD;  Location: [facility];  Service: Cardiothoracic;  Laterality: Left;    THORACOSCOPIC DECORTICATION OF LUNG Left 11/30/2022    Procedure: VATS, WITH DECORTICATION, LUNG;  Surgeon: Matt Martinez MD;  Location: [facility];  Service: Cardiothoracic;  Laterality: Left;  EMPYEMA       Time Tracking:     PT Received On: 12/01/22  PT Start Time: 0940     PT Stop Time: 1005  PT Total Time (min): 25 min     Billable Minutes: Evaluation 10 and Gait Training 15      12/01/2022

## 2022-12-01 NOTE — PLAN OF CARE
VSS overnight. Pt up to chair. CT output noted- see flowsheets for info. PRN pain meds given, see MAR. Fall and safety precautions in place. Call light within reach.

## 2022-12-01 NOTE — ASSESSMENT & PLAN NOTE
The patient is a 37-year-old male with history of tobacco abuse who presented with shortness of breath.  CT scan shows left lung consolidation and a loculated parapneumonic collection.  Thoracentesis shows that the pleural fluid has a turbid appearance and has a markedly elevated WBC at 17906.cu mm.    The the findings of  loculated pleural effusion on CT, in the setting of consolidated left lower lobe, with an elevated WBC in turbid appearing fluid is compatible with a parapneumonic effusion most likely an empyema.  The patient is a candidate for a VATS procedure with evacuation of empyema.  The risks and benefits of the procedure was explained to the patient.  The patient understands the risks and benefits of surgery and has agreed to proceed with VATS with evacuation of empyema for 11/30/2022.    12/01/2022   The patient is postop day 1 status post left VATS procedure with evacuation of empyema and decortication.  Patient is complaining of significant pain at surgical site.  Will add Toradol to his pain management.  Continue chest tube to water seal.  Continue antibiotic treatment with pip-tazo.

## 2022-12-02 LAB
ALBUMIN SERPL BCP-MCNC: 2.5 G/DL (ref 3.5–5.2)
ALP SERPL-CCNC: 127 U/L (ref 55–135)
ALT SERPL W/O P-5'-P-CCNC: 118 U/L (ref 10–44)
ANION GAP SERPL CALC-SCNC: 13 MMOL/L (ref 8–16)
AST SERPL-CCNC: 52 U/L (ref 10–40)
BILIRUB DIRECT SERPL-MCNC: 0.4 MG/DL (ref 0.1–0.3)
BILIRUB SERPL-MCNC: 0.6 MG/DL (ref 0.1–1)
BUN SERPL-MCNC: 11 MG/DL (ref 6–20)
CALCIUM SERPL-MCNC: 8.8 MG/DL (ref 8.7–10.5)
CHLORIDE SERPL-SCNC: 106 MMOL/L (ref 95–110)
CO2 SERPL-SCNC: 21 MMOL/L (ref 23–29)
CREAT SERPL-MCNC: 0.8 MG/DL (ref 0.5–1.4)
EST. GFR  (NO RACE VARIABLE): >60 ML/MIN/1.73 M^2
FINAL PATHOLOGIC DIAGNOSIS: NORMAL
GLUCOSE SERPL-MCNC: 111 MG/DL (ref 70–110)
Lab: NORMAL
MAGNESIUM SERPL-MCNC: 2 MG/DL (ref 1.6–2.6)
POTASSIUM SERPL-SCNC: 3.8 MMOL/L (ref 3.5–5.1)
PROT SERPL-MCNC: 7 G/DL (ref 6–8.4)
SODIUM SERPL-SCNC: 140 MMOL/L (ref 136–145)

## 2022-12-02 PROCEDURE — 94640 AIRWAY INHALATION TREATMENT: CPT

## 2022-12-02 PROCEDURE — 25000003 PHARM REV CODE 250: Performed by: INTERNAL MEDICINE

## 2022-12-02 PROCEDURE — 25000003 PHARM REV CODE 250: Performed by: THORACIC SURGERY (CARDIOTHORACIC VASCULAR SURGERY)

## 2022-12-02 PROCEDURE — 83735 ASSAY OF MAGNESIUM: CPT | Performed by: THORACIC SURGERY (CARDIOTHORACIC VASCULAR SURGERY)

## 2022-12-02 PROCEDURE — 94799 UNLISTED PULMONARY SVC/PX: CPT

## 2022-12-02 PROCEDURE — 63600175 PHARM REV CODE 636 W HCPCS: Performed by: THORACIC SURGERY (CARDIOTHORACIC VASCULAR SURGERY)

## 2022-12-02 PROCEDURE — 94761 N-INVAS EAR/PLS OXIMETRY MLT: CPT

## 2022-12-02 PROCEDURE — 97116 GAIT TRAINING THERAPY: CPT

## 2022-12-02 PROCEDURE — 94760 N-INVAS EAR/PLS OXIMETRY 1: CPT

## 2022-12-02 PROCEDURE — 25000003 PHARM REV CODE 250: Performed by: NURSE PRACTITIONER

## 2022-12-02 PROCEDURE — 25000242 PHARM REV CODE 250 ALT 637 W/ HCPCS: Performed by: THORACIC SURGERY (CARDIOTHORACIC VASCULAR SURGERY)

## 2022-12-02 PROCEDURE — 80076 HEPATIC FUNCTION PANEL: CPT | Performed by: INTERNAL MEDICINE

## 2022-12-02 PROCEDURE — 11000001 HC ACUTE MED/SURG PRIVATE ROOM

## 2022-12-02 PROCEDURE — 63600175 PHARM REV CODE 636 W HCPCS: Performed by: INTERNAL MEDICINE

## 2022-12-02 PROCEDURE — 21400001 HC TELEMETRY ROOM

## 2022-12-02 PROCEDURE — 80048 BASIC METABOLIC PNL TOTAL CA: CPT | Performed by: THORACIC SURGERY (CARDIOTHORACIC VASCULAR SURGERY)

## 2022-12-02 PROCEDURE — 36415 COLL VENOUS BLD VENIPUNCTURE: CPT | Performed by: THORACIC SURGERY (CARDIOTHORACIC VASCULAR SURGERY)

## 2022-12-02 RX ADMIN — KETOROLAC TROMETHAMINE 15 MG: 30 INJECTION, SOLUTION INTRAMUSCULAR; INTRAVENOUS at 10:12

## 2022-12-02 RX ADMIN — IPRATROPIUM BROMIDE AND ALBUTEROL SULFATE 3 ML: 2.5; .5 SOLUTION RESPIRATORY (INHALATION) at 07:12

## 2022-12-02 RX ADMIN — MUPIROCIN: 20 OINTMENT TOPICAL at 08:12

## 2022-12-02 RX ADMIN — OXYCODONE HYDROCHLORIDE 5 MG: 5 TABLET ORAL at 10:12

## 2022-12-02 RX ADMIN — ACETAMINOPHEN 650 MG: 325 TABLET ORAL at 05:12

## 2022-12-02 RX ADMIN — TRAZODONE HYDROCHLORIDE 50 MG: 50 TABLET ORAL at 09:12

## 2022-12-02 RX ADMIN — ACETAMINOPHEN 650 MG: 325 TABLET ORAL at 12:12

## 2022-12-02 RX ADMIN — SODIUM CHLORIDE 3 G: 9 INJECTION, SOLUTION INTRAVENOUS at 07:12

## 2022-12-02 RX ADMIN — SODIUM CHLORIDE 3 G: 9 INJECTION, SOLUTION INTRAVENOUS at 02:12

## 2022-12-02 RX ADMIN — ACETAMINOPHEN 650 MG: 325 TABLET ORAL at 11:12

## 2022-12-02 RX ADMIN — OXYCODONE HYDROCHLORIDE 5 MG: 5 TABLET ORAL at 05:12

## 2022-12-02 RX ADMIN — KETOROLAC TROMETHAMINE 15 MG: 30 INJECTION, SOLUTION INTRAMUSCULAR; INTRAVENOUS at 05:12

## 2022-12-02 RX ADMIN — KETOROLAC TROMETHAMINE 15 MG: 30 INJECTION, SOLUTION INTRAMUSCULAR; INTRAVENOUS at 01:12

## 2022-12-02 RX ADMIN — ONDANSETRON 8 MG: 8 TABLET, ORALLY DISINTEGRATING ORAL at 12:12

## 2022-12-02 RX ADMIN — OXYCODONE HYDROCHLORIDE 5 MG: 5 TABLET ORAL at 12:12

## 2022-12-02 RX ADMIN — SODIUM CHLORIDE 3 G: 9 INJECTION, SOLUTION INTRAVENOUS at 01:12

## 2022-12-02 RX ADMIN — IPRATROPIUM BROMIDE AND ALBUTEROL SULFATE 3 ML: 2.5; .5 SOLUTION RESPIRATORY (INHALATION) at 01:12

## 2022-12-02 RX ADMIN — OXYCODONE HYDROCHLORIDE 5 MG: 5 TABLET ORAL at 06:12

## 2022-12-02 RX ADMIN — SERTRALINE HYDROCHLORIDE 50 MG: 50 TABLET ORAL at 09:12

## 2022-12-02 NOTE — PROGRESS NOTES
[facility] - Intensive Care ([facility])  [facility] Medicine  Progress Note    Patient Name: Zane Horne Jr.  MRN: [id number]  Patient Class: IP- Inpatient   Admission Date: 11/28/2022  Length of Stay: 4 days  Attending Physician: Marco Nguyễn, *  Primary Care Provider: Primary Doctor No        Subjective:     Principal Problem:Empyema        HPI:     Mr. Horne is a 37 year old male with a history of depression and tobacco use (1 pack per day x 15 years) who presents to the ED with complaints of worsening shortness of breath x 3 days assocaited with productive cough and chest pain, symptoms are constant and moderate in nature, no relieving or exacerbating factors.  Upon arrival in the ED, patient tachypneic, tachycardiac, o2 sats mid 80's on room air.  Placed on 2 liters with noted improvement.  Lab work notable for WBC 17, bili 3.4,ALT 65, AST 51, normal troponin and BNP.  EKG showed ST.  Lactic pending.  Chest xray showed PNA.  Patient was given bolus, blood cultures drawn, started on abx and breathing treatments.  He will be admitted to hospital medicine service for acute hypoxic resp failure/sepsis d/t pna.     At assessment, patient lying in bed, o2 sats stable on 2 liters, complaints of yellow tinged productive cough and chest pain that is worse with movement, cough or deep breath. Of note, earlier last month patient did have a lap cr and has since followed up with his surgeon and has been doing well since that time.     Code Status, Full  Surrogate Decision Maker, wife, Mary      Overview/Hospital Course:  36 y/o WM admitted with a DX of PNA  and  Loculated parapneumonic effusion . Started on IV zosyn and vanc . S/P Thoracentesis  -  250 ccs of dark anibal fluid removed  . Pleural effusion cell diff show a wbc > 30 k .PH and glucose pleural fluid pending .  CTS consulted  and performed a VATS on 11/30 ( Multiple loculated pockets of purulent fluid with fibrinous purulent adhesions.  Well developed  rind over the left lower lobe) . The blood cx and Pleural effusion  cx are NGTD .       Interval History:  NAEON . CT in place with more than 500 cc outpt .  The blood and pleural fluid cx NGTD .  The IVAB changed to Unasyn     Review of Systems   Constitutional:  Positive for activity change.   HENT: Negative.     Eyes: Negative.    Respiratory:  Positive for cough and shortness of breath.    Cardiovascular:  Negative for chest pain.   Gastrointestinal: Negative.    Endocrine: Negative.    Genitourinary: Negative.    Musculoskeletal:  Positive for back pain.   Skin: Negative.    Allergic/Immunologic: Negative.    Neurological: Negative.    Hematological: Negative.    Psychiatric/Behavioral:  Negative for dysphoric mood. The patient is nervous/anxious.    Objective:     Vital Signs (Most Recent):  Temp: 97.6 °F (36.4 °C) (12/02/22 0715)  Pulse: 84 (12/02/22 0805)  Resp: (!) 35 (12/02/22 1011)  BP: (!) 153/84 (12/02/22 0805)  SpO2: 96 % (12/02/22 0805)   Vital Signs (24h Range):  Temp:  [97.6 °F (36.4 °C)-98.5 °F (36.9 °C)] 97.6 °F (36.4 °C)  Pulse:  [] 84  Resp:  [16-35] 35  SpO2:  [93 %-98 %] 96 %  BP: (149-174)/(70-94) 153/84     Weight: 85.7 kg (188 lb 15 oz)  Body mass index is 23.62 kg/m².    Intake/Output Summary (Last 24 hours) at 12/2/2022 1013  Last data filed at 12/2/2022 0701  Gross per 24 hour   Intake 962.73 ml   Output 580 ml   Net 382.73 ml      Physical Exam  Constitutional:       Appearance: Normal appearance.   HENT:      Head: Normocephalic and atraumatic.      Mouth/Throat:      Mouth: Mucous membranes are moist.   Eyes:      Extraocular Movements: Extraocular movements intact.      Pupils: Pupils are equal, round, and reactive to light.   Cardiovascular:      Rate and Rhythm: Normal rate.      Pulses: Normal pulses.      Heart sounds: Normal heart sounds.   Pulmonary:      Effort: Pulmonary effort is normal.      Comments: Left side chest tube in place   Abdominal:      General: Abdomen is  flat. Bowel sounds are normal.      Palpations: Abdomen is soft.   Musculoskeletal:      Right lower leg: No edema.      Left lower leg: No edema.   Skin:     General: Skin is warm and dry.   Neurological:      Mental Status: He is alert and oriented to person, place, and time.   Psychiatric:         Behavior: Behavior normal.       Significant Labs: All pertinent labs within the past 24 hours have been reviewed.      Significant Imaging: I have reviewed all pertinent imaging results/findings within the past 24 hours.        Assessment/Plan:      * Empyema  - Etiology unknown .   (+) for Rotten  Teeth    With no sign of active infection .    -S/P Thoracentesis which sow a > 30 l wbc   -Cont IVAB  -Blood and fluid Cxs NGTD    -CTS consulted . S/p VATS =  Multiple loculated pockets of purulent fluid with fibrinous purulent adhesions.  Well developed rind over the left lower lobe.  -CT in place     Tobacco use  -1 pack per day x 15 years approx  -decline nicotine patch  counseled on cessation      Sepsis without acute organ dysfunction  2/2 to PNA an empyema  Cont IVAB  Blood and pleural fluid cx NGTD        Chest pain on breathing  - likely pleuritic in nature, worse with cough/deep breath  - tend troponin, have been negative thus far  - po pain medication prn      PNA (pneumonia)  - IV abx   -Loculated left pleural effusion   -S/P VATS       Acute respiratory failure with hypoxia  Patient with Hypoxic Respiratory failure which is Acute.  he is not on home oxygen. Supplemental oxygen was provided and noted-  .   Signs/symptoms of respiratory failure include- tachypnea and increased work of breathing. Contributing diagnoses includes - Pneumonia Labs and images were reviewed. Patient Has not had a recent ABG. Will treat underlying causes and adjust management of respiratory failure as follows.  - continue iv abx for pna  - duo nebs as needed  - wean o2 to keep sats > 90%        VTE Risk Mitigation (From admission,  onward)         Ordered     IP VTE LOW RISK PATIENT  Once         11/29/22 1917     Place SILVINO hose  Until discontinued         11/29/22 1917     Place sequential compression device  Until discontinued         11/28/22 0424                Discharge Planning   IVY:      Code Status: Full Code   Is the patient medically ready for discharge?:     Reason for patient still in hospital (select all that apply): Treatment  Discharge Plan A: Home, Home with family                Marco Nguyễn MD  Department of Hospital Medicine   [facility] - Intensive Care ([facility])

## 2022-12-02 NOTE — SUBJECTIVE & OBJECTIVE
Interval History:  The patient is postop day 2 status post left VATS procedure with evacuation of empyema and decortication.    ROS  Medications:  Continuous Infusions:  Scheduled Meds:   acetaminophen  650 mg Oral Q6H    albuterol-ipratropium  3 mL Nebulization Q6H WAKE    ampicillin-sulbactim (UNASYN) IVPB  3 g Intravenous Q6H    docusate sodium  100 mg Oral BID    ketorolac  15 mg Intravenous Q8H    mupirocin   Nasal BID    polyethylene glycol  17 g Oral Daily    sertraline  50 mg Oral QHS    traZODone  50 mg Oral QHS     PRN Meds:albuterol-ipratropium, ALPRAZolam, diphenhydrAMINE, hydrOXYzine pamoate, influenza, melatonin, metoclopramide HCl, morphine, ondansetron, oxyCODONE, pneumoc 20-panfilo conj-dip cr(PF), sodium chloride 0.9%, sodium chloride 0.9%     Objective:     Vital Signs (Most Recent):  Temp: 97.6 °F (36.4 °C) (12/02/22 0715)  Pulse: 84 (12/02/22 0805)  Resp: (!) 35 (12/02/22 1011)  BP: (!) 153/84 (12/02/22 0805)  SpO2: 96 % (12/02/22 0805)   Vital Signs (24h Range):  Temp:  [97.6 °F (36.4 °C)-98.5 °F (36.9 °C)] 97.6 °F (36.4 °C)  Pulse:  [] 84  Resp:  [16-35] 35  SpO2:  [93 %-98 %] 96 %  BP: (149-174)/(70-94) 153/84     Weight: 85.7 kg (188 lb 15 oz)  Body mass index is 23.62 kg/m².    SpO2: 96 %  O2 Device (Oxygen Therapy): room air    Intake/Output - Last 3 Shifts         11/30 0700 12/01 0659 12/01 0700 12/02 0659 12/02 0700 12/03 0659    P.O. 240 500     I.V. (mL/kg) 194.7 (2.2) 64.7 (0.8)     IV Piggyback 1523.3 398     Total Intake(mL/kg) 1958 (22) 962.7 (11.2)     Urine (mL/kg/hr) 1350 (0.6) 500 (0.2)     Stool  0     Chest Tube 260 90 20    Total Output 1610 590 20    Net +348 +372.7 -20           Urine Occurrence  2 x     Stool Occurrence  1 x             Lines/Drains/Airways       Drain  Duration                  Y Chest Tube 1 and 2 11/30/22 1143 1 Left Pleural 32 Fr. 2 Left Pleural 32 Fr. 1 day              Peripheral Intravenous Line  Duration                  Peripheral IV -  Single Lumen 18 G Left Forearm -- days         Peripheral IV - Single Lumen 11/29/22 0035 20 G Anterior;Right Wrist 3 days                    Physical Exam  Constitutional:       Appearance: Normal appearance.   Cardiovascular:      Rate and Rhythm: Normal rate and regular rhythm.      Heart sounds: Normal heart sounds.   Pulmonary:      Effort: Pulmonary effort is normal.      Breath sounds: Normal breath sounds.   Abdominal:      General: Abdomen is flat. Bowel sounds are normal.      Palpations: Abdomen is soft.   Musculoskeletal:      Right lower leg: No edema.      Left lower leg: No edema.   Skin:     General: Skin is warm and dry.   Neurological:      General: No focal deficit present.      Mental Status: He is alert and oriented to person, place, and time.   Psychiatric:         Behavior: Behavior normal.       Significant Labs:  All pertinent labs from the last 24 hours have been reviewed.    Significant Diagnostics:  I have reviewed all pertinent imaging results/findings within the past 24 hours.

## 2022-12-02 NOTE — ASSESSMENT & PLAN NOTE
- Etiology unknown .   (+) for Rotten  Teeth    With no sign of active infection .    -S/P Thoracentesis which sow a > 30 l wbc   -Cont IVAB  -Blood and fluid Cxs NGTD    -CTS consulted . S/p VATS =  Multiple loculated pockets of purulent fluid with fibrinous purulent adhesions.  Well developed rind over the left lower lobe.  -CT in place

## 2022-12-02 NOTE — PROGRESS NOTES
[facility] - Intensive Care [facility])  Cardiothoracic Surgery  Progress Note    Patient Name: Zane Horne Jr.  MRN: [id number]  Admission Date: 11/28/2022  Hospital Length of Stay: 4 days  Code Status: Full Code   Attending Physician: Marco Nguyễn, *   Referring Provider: Self, Aaareferral  Principal Problem:Empyema            Subjective:     Post-Op Info:  Procedure(s) (LRB):  VATS, WITH DECORTICATION, LUNG (Left)  BLOCK, NERVE, INTERCOSTAL, 2 OR MORE (Left)   2 Days Post-Op     Interval History:  The patient is postop day 2 status post left VATS procedure with evacuation of empyema and decortication.    ROS  Medications:  Continuous Infusions:  Scheduled Meds:   acetaminophen  650 mg Oral Q6H    albuterol-ipratropium  3 mL Nebulization Q6H WAKE    ampicillin-sulbactim (UNASYN) IVPB  3 g Intravenous Q6H    docusate sodium  100 mg Oral BID    ketorolac  15 mg Intravenous Q8H    mupirocin   Nasal BID    polyethylene glycol  17 g Oral Daily    sertraline  50 mg Oral QHS    traZODone  50 mg Oral QHS     PRN Meds:albuterol-ipratropium, ALPRAZolam, diphenhydrAMINE, hydrOXYzine pamoate, influenza, melatonin, metoclopramide HCl, morphine, ondansetron, oxyCODONE, pneumoc 20-panfilo conj-dip cr(PF), sodium chloride 0.9%, sodium chloride 0.9%     Objective:     Vital Signs (Most Recent):  Temp: 97.6 °F (36.4 °C) (12/02/22 0715)  Pulse: 84 (12/02/22 0805)  Resp: (!) 35 (12/02/22 1011)  BP: (!) 153/84 (12/02/22 0805)  SpO2: 96 % (12/02/22 0805)   Vital Signs (24h Range):  Temp:  [97.6 °F (36.4 °C)-98.5 °F (36.9 °C)] 97.6 °F (36.4 °C)  Pulse:  [] 84  Resp:  [16-35] 35  SpO2:  [93 %-98 %] 96 %  BP: (149-174)/(70-94) 153/84     Weight: 85.7 kg (188 lb 15 oz)  Body mass index is 23.62 kg/m².    SpO2: 96 %  O2 Device (Oxygen Therapy): room air    Intake/Output - Last 3 Shifts         11/30 0700 12/01 0659 12/01 0700 12/02 0659 12/02 0700  12/03 0659    P.O. 240 500     I.V. (mL/kg) 194.7 (2.2) 64.7 (0.8)      IV Piggyback 1523.3 398     Total Intake(mL/kg) 1958 (22) 962.7 (11.2)     Urine (mL/kg/hr) 1350 (0.6) 500 (0.2)     Stool  0     Chest Tube 260 90 20    Total Output 1610 590 20    Net +348 +372.7 -20           Urine Occurrence  2 x     Stool Occurrence  1 x             Lines/Drains/Airways       Drain  Duration                  Y Chest Tube 1 and 2 11/30/22 1143 1 Left Pleural 32 Fr. 2 Left Pleural 32 Fr. 1 day              Peripheral Intravenous Line  Duration                  Peripheral IV - Single Lumen 18 G Left Forearm -- days         Peripheral IV - Single Lumen 11/29/22 0035 20 G Anterior;Right Wrist 3 days                    Physical Exam  Constitutional:       Appearance: Normal appearance.   Cardiovascular:      Rate and Rhythm: Normal rate and regular rhythm.      Heart sounds: Normal heart sounds.   Pulmonary:      Effort: Pulmonary effort is normal.      Breath sounds: Normal breath sounds.   Abdominal:      General: Abdomen is flat. Bowel sounds are normal.      Palpations: Abdomen is soft.   Musculoskeletal:      Right lower leg: No edema.      Left lower leg: No edema.   Skin:     General: Skin is warm and dry.   Neurological:      General: No focal deficit present.      Mental Status: He is alert and oriented to person, place, and time.   Psychiatric:         Behavior: Behavior normal.       Significant Labs:  All pertinent labs from the last 24 hours have been reviewed.    Significant Diagnostics:  I have reviewed all pertinent imaging results/findings within the past 24 hours.    Assessment/Plan:     * Empyema  The patient is a 37-year-old male with history of tobacco abuse who presented with shortness of breath.  CT scan shows left lung consolidation and a loculated parapneumonic collection.  Thoracentesis shows that the pleural fluid has a turbid appearance and has a markedly elevated WBC at 62766.cu mm.    The the findings of  loculated pleural effusion on CT, in the setting of consolidated  left lower lobe, with an elevated WBC in turbid appearing fluid is compatible with a parapneumonic effusion most likely an empyema.  The patient is a candidate for a VATS procedure with evacuation of empyema.  The risks and benefits of the procedure was explained to the patient.  The patient understands the risks and benefits of surgery and has agreed to proceed with VATS with evacuation of empyema for 11/30/2022.    12/01/2022   The patient is postop day 1 status post left VATS procedure with evacuation of empyema and decortication.  Patient is complaining of significant pain at surgical site.  Will add Toradol to his pain management.  Continue chest tube to water seal.  Continue antibiotic treatment with pip-tazo.    12/02/2022   The patient is postop day 2 status post left VATS procedure and evacuation of empyema with decortication.  Patient has much better pain control.  Continue chest tube salt water seal.  Continue antibiotics.  Will discontinue chest tube when drainage is less than 50 mL in a 24 hour period.        Matt Martinez MD  Cardiothoracic Surgery  [facility] - Intensive Care ([facility])

## 2022-12-02 NOTE — ASSESSMENT & PLAN NOTE
The patient is a 37-year-old male with history of tobacco abuse who presented with shortness of breath.  CT scan shows left lung consolidation and a loculated parapneumonic collection.  Thoracentesis shows that the pleural fluid has a turbid appearance and has a markedly elevated WBC at 32931.cu mm.    The the findings of  loculated pleural effusion on CT, in the setting of consolidated left lower lobe, with an elevated WBC in turbid appearing fluid is compatible with a parapneumonic effusion most likely an empyema.  The patient is a candidate for a VATS procedure with evacuation of empyema.  The risks and benefits of the procedure was explained to the patient.  The patient understands the risks and benefits of surgery and has agreed to proceed with VATS with evacuation of empyema for 11/30/2022.    12/01/2022   The patient is postop day 1 status post left VATS procedure with evacuation of empyema and decortication.  Patient is complaining of significant pain at surgical site.  Will add Toradol to his pain management.  Continue chest tube to water seal.  Continue antibiotic treatment with pip-tazo.    12/02/2022   The patient is postop day 2 status post left VATS procedure and evacuation of empyema with decortication.  Patient has much better pain control.  Continue chest tube salt water seal.  Continue antibiotics.  Will discontinue chest tube when drainage is less than 50 mL in a 24 hour period.

## 2022-12-02 NOTE — PT/OT/SLP PROGRESS
"Physical Therapy  Treatment and discharge    Zane Horne Jr.   MRN: [id number]   Admitting Diagnosis: Empyema    PT Received On: 12/01/22  PT Start Time: 0926     PT Stop Time: 0953    PT Total Time (min): 27 min       Billable Minutes:  Gait Training 27    Treatment Type: Treatment  PT/PTA: PT     PTA Visit Number: 0       General Precautions: Standard, fall  Orthopedic Precautions: N/A   Braces: N/A  Respiratory Status: Room air    Spiritual, Cultural Beliefs, Confucianism Practices, Values that Affect Care: no    Subjective:  Communicated with nursing prior to session.  Pt agreeable to PT services "I've been waiting on you"    Pain/Comfort  Pain Rating 1: 0/10    Objective:   Patient found with: telemetry, peripheral IV, blood pressure cuff, pulse ox (continuous)    Functional Mobility:  Bed Mobility:    independent    Transfers:   independent    Gait:    independent    Treatment and Education:  Educated pt on benefits of consistent participation in PT services to meet functional goals. Educated pt on seated therex to promote strength and joint mobility. Educated to perform exercises intermittently throughout day to tolerance. Educated pt on importance of sitting OOB to promote endurance and overall activity tolerance. Pt expressed understanding. Educated pt on call don't fall policy and use of call button to alert nursing staff of needs.       AM-PAC 6 CLICK MOBILITY  How much help from another person does this patient currently need?   1 = Unable, Total/Dependent Assistance  2 = A lot, Maximum/Moderate Assistance  3 = A little, Minimum/Contact Guard/Supervision  4 = None, Modified [place]/Independent    Turning over in bed (including adjusting bedclothes, sheets and blankets)?: 4  Sitting down on and standing up from a chair with arms (e.g., wheelchair, bedside commode, etc.): 4  Moving from lying on back to sitting on the side of the bed?: 4  Moving to and from a bed to a chair (including a wheelchair)?: " 4  Need to walk in hospital room?: 4  Climbing 3-5 steps with a railing?: 1  Basic Mobility Total Score: 21    AM-PAC Raw Score CMS G-Code Modifier Level of Impairment Assistance   6 % Total / Unable   7 - 9 CM 80 - 100% Maximal Assist   10 - 14 CL 60 - 80% Moderate Assist   15 - 19 CK 40 - 60% Moderate Assist   20 - 22 CJ 20 - 40% Minimal Assist   23 CI 1-20% SBA / CGA   24 CH 0% Independent/ Mod I     Patient left up in chair with all lines intact, call button in reach, and nursing notified.    Assessment:  Zane Horne Jr. is a 37 y.o. male with a medical diagnosis of Empyema and presented with deficits in strength, balance, and coordination secondary to empyema. Pt doing very well and able to tolerate gait activity with no AD at community distances. Pt appears well motivated to continued ambulating in hallways with [facility] staff. Pt has no acute PT issues at this time and will be discharged from PT services.    Rehab identified problem list/impairments: n/a    Discharge recommendations: Discharge Facility/Level of Care Needs: home     Barriers to discharge:      Equipment recommendations: Equipment Needed After Discharge: none     GOALS:   Multidisciplinary Problems       Physical Therapy Goals          Problem: Physical Therapy    Goal Priority Disciplines Outcome Goal Variances Interventions   Physical Therapy Goal     PT, PT/OT Ongoing, Progressing     Description: Pt will perform bed mobility independently in order to participate in EOB activity.  Pt will perform transfers independently in order to participate in OOB activity.   Pt will ambulate 150ft mod I with LRAD in order to participate in daily tasks.                         PLAN:    Patient to be seen: no further PT needs  Plan of Care expires: 12/02/22  Plan of Care reviewed with: patient         12/02/2022

## 2022-12-02 NOTE — PLAN OF CARE
TX COMPLETED: facilitated bed mobility with independence, transfers independently, ambulated 400 ft x 5 with no AD. Pt doing well and ambulating at increased distance with no LOB or s/s of distress. No further PT needs at this time

## 2022-12-02 NOTE — PLAN OF CARE
No acute events overnight. VSS. CT intact with no subcutaneous emphysema; 90 mL total output for 24 hours. PRN treatment of pain, nausea, insomnia per order, see MAR. POC reviewed with patient. Safety and fall precautions maintained.

## 2022-12-02 NOTE — SUBJECTIVE & OBJECTIVE
Interval History:  NAEON . CT in place with more than 500 cc outpt .  The blood and pleural fluid cx NGTD .  The IVAB changed to Unasyn     Review of Systems   Constitutional:  Positive for activity change.   HENT: Negative.     Eyes: Negative.    Respiratory:  Positive for cough and shortness of breath.    Cardiovascular:  Negative for chest pain.   Gastrointestinal: Negative.    Endocrine: Negative.    Genitourinary: Negative.    Musculoskeletal:  Positive for back pain.   Skin: Negative.    Allergic/Immunologic: Negative.    Neurological: Negative.    Hematological: Negative.    Psychiatric/Behavioral:  Negative for dysphoric mood. The patient is nervous/anxious.    Objective:     Vital Signs (Most Recent):  Temp: 97.6 °F (36.4 °C) (12/02/22 0715)  Pulse: 84 (12/02/22 0805)  Resp: (!) 35 (12/02/22 1011)  BP: (!) 153/84 (12/02/22 0805)  SpO2: 96 % (12/02/22 0805)   Vital Signs (24h Range):  Temp:  [97.6 °F (36.4 °C)-98.5 °F (36.9 °C)] 97.6 °F (36.4 °C)  Pulse:  [] 84  Resp:  [16-35] 35  SpO2:  [93 %-98 %] 96 %  BP: (149-174)/(70-94) 153/84     Weight: 85.7 kg (188 lb 15 oz)  Body mass index is 23.62 kg/m².    Intake/Output Summary (Last 24 hours) at 12/2/2022 1013  Last data filed at 12/2/2022 0701  Gross per 24 hour   Intake 962.73 ml   Output 580 ml   Net 382.73 ml      Physical Exam  Constitutional:       Appearance: Normal appearance.   HENT:      Head: Normocephalic and atraumatic.      Mouth/Throat:      Mouth: Mucous membranes are moist.   Eyes:      Extraocular Movements: Extraocular movements intact.      Pupils: Pupils are equal, round, and reactive to light.   Cardiovascular:      Rate and Rhythm: Normal rate.      Pulses: Normal pulses.      Heart sounds: Normal heart sounds.   Pulmonary:      Effort: Pulmonary effort is normal.      Comments: Left side chest tube in place   Abdominal:      General: Abdomen is flat. Bowel sounds are normal.      Palpations: Abdomen is soft.   Musculoskeletal:       Right lower leg: No edema.      Left lower leg: No edema.   Skin:     General: Skin is warm and dry.   Neurological:      Mental Status: He is alert and oriented to person, place, and time.   Psychiatric:         Behavior: Behavior normal.       Significant Labs: All pertinent labs within the past 24 hours have been reviewed.      Significant Imaging: I have reviewed all pertinent imaging results/findings within the past 24 hours.

## 2022-12-03 LAB
ANION GAP SERPL CALC-SCNC: 11 MMOL/L (ref 8–16)
BACTERIA BLD CULT: NORMAL
BACTERIA BLD CULT: NORMAL
BACTERIA FLD AEROBE CULT: NO GROWTH
BASOPHILS # BLD AUTO: 0.06 K/UL (ref 0–0.2)
BASOPHILS NFR BLD: 0.4 % (ref 0–1.9)
BUN SERPL-MCNC: 10 MG/DL (ref 6–20)
CALCIUM SERPL-MCNC: 8.9 MG/DL (ref 8.7–10.5)
CHLORIDE SERPL-SCNC: 107 MMOL/L (ref 95–110)
CO2 SERPL-SCNC: 21 MMOL/L (ref 23–29)
CREAT SERPL-MCNC: 0.8 MG/DL (ref 0.5–1.4)
DIFFERENTIAL METHOD: ABNORMAL
EOSINOPHIL # BLD AUTO: 1.2 K/UL (ref 0–0.5)
EOSINOPHIL NFR BLD: 7.6 % (ref 0–8)
ERYTHROCYTE [DISTWIDTH] IN BLOOD BY AUTOMATED COUNT: 12.5 % (ref 11.5–14.5)
EST. GFR  (NO RACE VARIABLE): >60 ML/MIN/1.73 M^2
GLUCOSE SERPL-MCNC: 100 MG/DL (ref 70–110)
GRAM STN SPEC: NORMAL
GRAM STN SPEC: NORMAL
HCT VFR BLD AUTO: 36.2 % (ref 40–54)
HGB BLD-MCNC: 12 G/DL (ref 14–18)
IMM GRANULOCYTES # BLD AUTO: 0.12 K/UL (ref 0–0.04)
IMM GRANULOCYTES NFR BLD AUTO: 0.8 % (ref 0–0.5)
LYMPHOCYTES # BLD AUTO: 2.3 K/UL (ref 1–4.8)
LYMPHOCYTES NFR BLD: 14.7 % (ref 18–48)
MAGNESIUM SERPL-MCNC: 2 MG/DL (ref 1.6–2.6)
MCH RBC QN AUTO: 27.5 PG (ref 27–31)
MCHC RBC AUTO-ENTMCNC: 33.1 G/DL (ref 32–36)
MCV RBC AUTO: 83 FL (ref 82–98)
MONOCYTES # BLD AUTO: 0.7 K/UL (ref 0.3–1)
MONOCYTES NFR BLD: 4.3 % (ref 4–15)
NEUTROPHILS # BLD AUTO: 11.1 K/UL (ref 1.8–7.7)
NEUTROPHILS NFR BLD: 72.2 % (ref 38–73)
NRBC BLD-RTO: 0 /100 WBC
PLATELET # BLD AUTO: 386 K/UL (ref 150–450)
PMV BLD AUTO: 9.6 FL (ref 9.2–12.9)
POTASSIUM SERPL-SCNC: 3.5 MMOL/L (ref 3.5–5.1)
RBC # BLD AUTO: 4.37 M/UL (ref 4.6–6.2)
SODIUM SERPL-SCNC: 139 MMOL/L (ref 136–145)
WBC # BLD AUTO: 15.38 K/UL (ref 3.9–12.7)

## 2022-12-03 PROCEDURE — 25000003 PHARM REV CODE 250: Performed by: INTERNAL MEDICINE

## 2022-12-03 PROCEDURE — 21400001 HC TELEMETRY ROOM

## 2022-12-03 PROCEDURE — 85025 COMPLETE CBC W/AUTO DIFF WBC: CPT | Performed by: INTERNAL MEDICINE

## 2022-12-03 PROCEDURE — 94761 N-INVAS EAR/PLS OXIMETRY MLT: CPT

## 2022-12-03 PROCEDURE — 94640 AIRWAY INHALATION TREATMENT: CPT

## 2022-12-03 PROCEDURE — 25000242 PHARM REV CODE 250 ALT 637 W/ HCPCS: Performed by: THORACIC SURGERY (CARDIOTHORACIC VASCULAR SURGERY)

## 2022-12-03 PROCEDURE — 83735 ASSAY OF MAGNESIUM: CPT | Performed by: THORACIC SURGERY (CARDIOTHORACIC VASCULAR SURGERY)

## 2022-12-03 PROCEDURE — 36415 COLL VENOUS BLD VENIPUNCTURE: CPT | Performed by: THORACIC SURGERY (CARDIOTHORACIC VASCULAR SURGERY)

## 2022-12-03 PROCEDURE — 63600175 PHARM REV CODE 636 W HCPCS: Performed by: THORACIC SURGERY (CARDIOTHORACIC VASCULAR SURGERY)

## 2022-12-03 PROCEDURE — 11000001 HC ACUTE MED/SURG PRIVATE ROOM

## 2022-12-03 PROCEDURE — 25000003 PHARM REV CODE 250: Performed by: NURSE PRACTITIONER

## 2022-12-03 PROCEDURE — 80048 BASIC METABOLIC PNL TOTAL CA: CPT | Performed by: THORACIC SURGERY (CARDIOTHORACIC VASCULAR SURGERY)

## 2022-12-03 PROCEDURE — 25000003 PHARM REV CODE 250: Performed by: THORACIC SURGERY (CARDIOTHORACIC VASCULAR SURGERY)

## 2022-12-03 PROCEDURE — 36415 COLL VENOUS BLD VENIPUNCTURE: CPT | Performed by: INTERNAL MEDICINE

## 2022-12-03 PROCEDURE — 94760 N-INVAS EAR/PLS OXIMETRY 1: CPT

## 2022-12-03 PROCEDURE — 63600175 PHARM REV CODE 636 W HCPCS: Performed by: INTERNAL MEDICINE

## 2022-12-03 PROCEDURE — 94799 UNLISTED PULMONARY SVC/PX: CPT

## 2022-12-03 RX ADMIN — OXYCODONE HYDROCHLORIDE 5 MG: 5 TABLET ORAL at 06:12

## 2022-12-03 RX ADMIN — IPRATROPIUM BROMIDE AND ALBUTEROL SULFATE 3 ML: 2.5; .5 SOLUTION RESPIRATORY (INHALATION) at 02:12

## 2022-12-03 RX ADMIN — KETOROLAC TROMETHAMINE 15 MG: 30 INJECTION, SOLUTION INTRAMUSCULAR; INTRAVENOUS at 02:12

## 2022-12-03 RX ADMIN — SODIUM CHLORIDE 3 G: 9 INJECTION, SOLUTION INTRAVENOUS at 12:12

## 2022-12-03 RX ADMIN — MUPIROCIN: 20 OINTMENT TOPICAL at 09:12

## 2022-12-03 RX ADMIN — SODIUM CHLORIDE 3 G: 9 INJECTION, SOLUTION INTRAVENOUS at 05:12

## 2022-12-03 RX ADMIN — ONDANSETRON 8 MG: 8 TABLET, ORALLY DISINTEGRATING ORAL at 05:12

## 2022-12-03 RX ADMIN — IPRATROPIUM BROMIDE AND ALBUTEROL SULFATE 3 ML: 2.5; .5 SOLUTION RESPIRATORY (INHALATION) at 07:12

## 2022-12-03 RX ADMIN — ACETAMINOPHEN 650 MG: 325 TABLET ORAL at 11:12

## 2022-12-03 RX ADMIN — SODIUM CHLORIDE 3 G: 9 INJECTION, SOLUTION INTRAVENOUS at 11:12

## 2022-12-03 RX ADMIN — SERTRALINE HYDROCHLORIDE 50 MG: 50 TABLET ORAL at 09:12

## 2022-12-03 RX ADMIN — OXYCODONE HYDROCHLORIDE 5 MG: 5 TABLET ORAL at 03:12

## 2022-12-03 RX ADMIN — MORPHINE SULFATE 1 MG: 2 INJECTION, SOLUTION INTRAMUSCULAR; INTRAVENOUS at 11:12

## 2022-12-03 RX ADMIN — MORPHINE SULFATE 1 MG: 2 INJECTION, SOLUTION INTRAMUSCULAR; INTRAVENOUS at 02:12

## 2022-12-03 RX ADMIN — KETOROLAC TROMETHAMINE 15 MG: 30 INJECTION, SOLUTION INTRAMUSCULAR; INTRAVENOUS at 09:12

## 2022-12-03 RX ADMIN — KETOROLAC TROMETHAMINE 15 MG: 30 INJECTION, SOLUTION INTRAMUSCULAR; INTRAVENOUS at 06:12

## 2022-12-03 RX ADMIN — ACETAMINOPHEN 650 MG: 325 TABLET ORAL at 05:12

## 2022-12-03 NOTE — PROGRESS NOTES
[facility]  Cardiothoracic Surgery  Progress Note    Patient Name: Zane Horne Jr.  MRN: [id number]  Admission Date: 11/28/2022  Hospital Length of Stay: 5 days  Code Status: Full Code   Attending Physician: Marco Nguyễn, *   Referring Provider: Self, Aaareferral  Principal Problem:Empyema            Subjective:     Post-Op Info:  Procedure(s) (LRB):  VATS, WITH DECORTICATION, LUNG (Left)  BLOCK, NERVE, INTERCOSTAL, 2 OR MORE (Left)   3 Days Post-Op     Interval History:  The patient is postop day 3 status post left VATS procedure with evacuation of empyema and decortication.    ROS  Medications:  Continuous Infusions:  Scheduled Meds:   acetaminophen  650 mg Oral Q6H    albuterol-ipratropium  3 mL Nebulization Q6H WAKE    ampicillin-sulbactim (UNASYN) IVPB  3 g Intravenous Q6H    docusate sodium  100 mg Oral BID    ketorolac  15 mg Intravenous Q8H    mupirocin   Nasal BID    polyethylene glycol  17 g Oral Daily    sertraline  50 mg Oral QHS    traZODone  50 mg Oral QHS     PRN Meds:albuterol-ipratropium, ALPRAZolam, diphenhydrAMINE, hydrOXYzine pamoate, influenza, melatonin, metoclopramide HCl, morphine, ondansetron, oxyCODONE, pneumoc 20-panfilo conj-dip cr(PF), sodium chloride 0.9%, sodium chloride 0.9%     Objective:     Vital Signs (Most Recent):  Temp: 98.2 °F (36.8 °C) (12/03/22 0732)  Pulse: 73 (12/03/22 0732)  Resp: 18 (12/03/22 0732)  BP: (!) 156/90 (12/03/22 0732)  SpO2: 97 % (12/03/22 0732)   Vital Signs (24h Range):  Temp:  [97.7 °F (36.5 °C)-98.6 °F (37 °C)] 98.2 °F (36.8 °C)  Pulse:  [65-91] 73  Resp:  [16-27] 18  SpO2:  [95 %-98 %] 97 %  BP: (134-156)/(77-90) 156/90     Weight: 87.9 kg (193 lb 12.6 oz)  Body mass index is 24.22 kg/m².    SpO2: 97 %  O2 Device (Oxygen Therapy): room air    Intake/Output - Last 3 Shifts         12/01 0700 12/02 0659 12/02 0700 12/03 0659 12/03 0700  12/04 0659    P.O. 500      I.V. (mL/kg) 64.7 (0.8)      IV Piggyback 398 296.4     Total  Intake(mL/kg) 962.7 (11.2) 296.4 (3.4)     Urine (mL/kg/hr) 500 (0.2) 350 (0.2)     Stool 0 0     Chest Tube 90 75     Total Output 590 425     Net +372.7 -128.6            Urine Occurrence 2 x 2 x     Stool Occurrence 1 x 3 x             Lines/Drains/Airways       Drain  Duration                  Y Chest Tube 1 and 2 11/30/22 1143 1 Left Pleural 32 Fr. 2 Left Pleural 32 Fr. 2 days              Peripheral Intravenous Line  Duration                  Peripheral IV - Single Lumen 11/29/22 0035 20 G Anterior;Right Wrist 4 days                    Physical Exam  Constitutional:       Appearance: Normal appearance.   HENT:      Head: Normocephalic and atraumatic.   Cardiovascular:      Rate and Rhythm: Normal rate and regular rhythm.   Pulmonary:      Effort: Pulmonary effort is normal.      Breath sounds: Normal breath sounds.   Abdominal:      General: Abdomen is flat.      Palpations: Abdomen is soft.   Musculoskeletal:      Right lower leg: No edema.      Left lower leg: No edema.   Skin:     General: Skin is warm and dry.   Neurological:      Mental Status: He is alert and oriented to person, place, and time.   Psychiatric:         Behavior: Behavior normal.       Significant Labs:  All pertinent labs from the last 24 hours have been reviewed.    Significant Diagnostics:  I have reviewed all pertinent imaging results/findings within the past 24 hours.    Assessment/Plan:     * Empyema  The patient is a 37-year-old male with history of tobacco abuse who presented with shortness of breath.  CT scan shows left lung consolidation and a loculated parapneumonic collection.  Thoracentesis shows that the pleural fluid has a turbid appearance and has a markedly elevated WBC at 98663.cu mm.    The the findings of  loculated pleural effusion on CT, in the setting of consolidated left lower lobe, with an elevated WBC in turbid appearing fluid is compatible with a parapneumonic effusion most likely an empyema.  The patient is a  candidate for a VATS procedure with evacuation of empyema.  The risks and benefits of the procedure was explained to the patient.  The patient understands the risks and benefits of surgery and has agreed to proceed with VATS with evacuation of empyema for 11/30/2022.    12/01/2022   The patient is postop day 1 status post left VATS procedure with evacuation of empyema and decortication.  Patient is complaining of significant pain at surgical site.  Will add Toradol to his pain management.  Continue chest tube to water seal.  Continue antibiotic treatment with pip-tazo.    12/02/2022   The patient is postop day 2 status post left VATS procedure and evacuation of empyema with decortication.  Patient has much better pain control.  Continue chest tube salt water seal.  Continue antibiotics.  Will discontinue chest tube when drainage is less than 50 mL in a 24 hour period.    12/03/2022   The patient is postop day 3 status post left VATS procedure with evacuation of empyema decortication.  Chest tube drainage is trending down.  Chest tube output was about 100 mL over the past 24 hours.  Continue antibiotics.  Continue chest tube to water seal.        Matt Martinez MD  Cardiothoracic Surgery  [facility]

## 2022-12-03 NOTE — ASSESSMENT & PLAN NOTE
- Etiology unknown .   (+) for Rotten  Teeth    With no sign of active infection .    -S/P Thoracentesis which sow a > 30 l wbc   -Cont IVAB  -Blood and fluid Cxs NGTD    -CTS consulted . S/p VATS =  Multiple loculated pockets of purulent fluid with fibrinous purulent adhesions.  Well developed rind over the left lower lobe.  -CT in place . Will be d/c when outpt is less than 500 daily

## 2022-12-03 NOTE — SUBJECTIVE & OBJECTIVE
Interval History:  CT output n 24 hrs 75 cc . NAEON . The blood and pleural fluid cx are NGTD .     Review of Systems   Constitutional:  Positive for activity change.   HENT: Negative.     Eyes: Negative.    Respiratory:  Negative for cough and shortness of breath.    Cardiovascular:  Negative for chest pain.   Gastrointestinal: Negative.    Endocrine: Negative.    Genitourinary: Negative.    Musculoskeletal:  Positive for back pain.   Skin: Negative.    Allergic/Immunologic: Negative.    Neurological: Negative.    Hematological: Negative.    Psychiatric/Behavioral:  Negative for dysphoric mood. The patient is nervous/anxious.    Objective:     Vital Signs (Most Recent):  Temp: 98.2 °F (36.8 °C) (12/03/22 0732)  Pulse: 73 (12/03/22 0732)  Resp: 18 (12/03/22 0732)  BP: (!) 156/90 (12/03/22 0732)  SpO2: 97 % (12/03/22 0732)   Vital Signs (24h Range):  Temp:  [97.7 °F (36.5 °C)-98.6 °F (37 °C)] 98.2 °F (36.8 °C)  Pulse:  [65-91] 73  Resp:  [16-35] 18  SpO2:  [95 %-98 %] 97 %  BP: (134-156)/(77-90) 156/90     Weight: 87.9 kg (193 lb 12.6 oz)  Body mass index is 24.22 kg/m².    Intake/Output Summary (Last 24 hours) at 12/3/2022 0948  Last data filed at 12/3/2022 0637  Gross per 24 hour   Intake 196.71 ml   Output 405 ml   Net -208.29 ml      Physical Exam  Constitutional:       Appearance: Normal appearance.   HENT:      Head: Normocephalic and atraumatic.      Mouth/Throat:      Mouth: Mucous membranes are moist.   Eyes:      Extraocular Movements: Extraocular movements intact.      Pupils: Pupils are equal, round, and reactive to light.   Cardiovascular:      Rate and Rhythm: Normal rate.      Pulses: Normal pulses.      Heart sounds: Normal heart sounds.   Pulmonary:      Effort: Pulmonary effort is normal.      Comments: Left side chest tube in place   Abdominal:      General: Abdomen is flat. Bowel sounds are normal.      Palpations: Abdomen is soft.   Musculoskeletal:      Right lower leg: No edema.      Left lower  leg: No edema.   Skin:     General: Skin is warm and dry.   Neurological:      Mental Status: He is alert and oriented to person, place, and time.   Psychiatric:         Behavior: Behavior normal.       Significant Labs: All pertinent labs within the past 24 hours have been reviewed.    Results for orders placed or performed during the hospital encounter of 11/28/22   Influenza A & B by Molecular    Specimen: Nasopharyngeal Swab   Result Value Ref Range    Influenza A, Molecular Negative Negative    Influenza B, Molecular Negative Negative    Flu A & B Source NP    Blood culture #1 **CANNOT BE ORDERED STAT**    Specimen: Peripheral, Antecubital, Left; Blood   Result Value Ref Range    Blood Culture, Routine No Growth to date     Blood Culture, Routine No Growth to date     Blood Culture, Routine No Growth to date     Blood Culture, Routine No Growth to date     Blood Culture, Routine No Growth to date    Blood culture #2 **CANNOT BE ORDERED STAT**    Specimen: Peripheral, Antecubital, Right; Blood   Result Value Ref Range    Blood Culture, Routine No Growth to date     Blood Culture, Routine No Growth to date     Blood Culture, Routine No Growth to date     Blood Culture, Routine No Growth to date     Blood Culture, Routine No Growth to date    Culture, Body Fluid (Aerobic) w/ GS    Specimen: Pleural Fluid   Result Value Ref Range    AEROBIC CULTURE - FLUID No growth     Gram Stain Result Rare WBC's     Gram Stain Result No organisms seen    AFB Culture & Smear    Specimen: Pleural Fluid   Result Value Ref Range    AFB Culture & Smear Culture in progress     AFB CULTURE STAIN No acid fast bacilli seen.    KOH prep    Specimen: Pleural Fluid   Result Value Ref Range    KOH Prep No yeast or fungal elements seen    Culture, MRSA    Specimen: Nares, Right; MRSA source   Result Value Ref Range    MRSA Surveillance Screen No MRSA isolated    Culture, Anaerobe    Specimen: Pleural Fluid   Result Value Ref Range     Anaerobic Culture Culture in progress    AFB Culture & Smear    Specimen: Pleural Fluid   Result Value Ref Range    AFB Culture & Smear Culture in progress     AFB CULTURE STAIN No acid fast bacilli seen.    Aerobic culture    Specimen: Pleural Fluid   Result Value Ref Range    Aerobic Bacterial Culture No growth    CBC auto differential   Result Value Ref Range    WBC 17.76 (H) 3.90 - 12.70 K/uL    RBC 4.93 4.60 - 6.20 M/uL    Hemoglobin 13.7 (L) 14.0 - 18.0 g/dL    Hematocrit 39.2 (L) 40.0 - 54.0 %    MCV 80 (L) 82 - 98 fL    MCH 27.8 27.0 - 31.0 pg    MCHC 34.9 32.0 - 36.0 g/dL    RDW 12.2 11.5 - 14.5 %    Platelets 287 150 - 450 K/uL    MPV 9.5 9.2 - 12.9 fL    Immature Granulocytes 0.4 0.0 - 0.5 %    Gran # (ANC) 15.7 (H) 1.8 - 7.7 K/uL    Immature Grans (Abs) 0.07 (H) 0.00 - 0.04 K/uL    Lymph # 0.7 (L) 1.0 - 4.8 K/uL    Mono # 1.2 (H) 0.3 - 1.0 K/uL    Eos # 0.0 0.0 - 0.5 K/uL    Baso # 0.03 0.00 - 0.20 K/uL    nRBC 0 0 /100 WBC    Gran % 88.3 (H) 38.0 - 73.0 %    Lymph % 4.1 (L) 18.0 - 48.0 %    Mono % 6.9 4.0 - 15.0 %    Eosinophil % 0.1 0.0 - 8.0 %    Basophil % 0.2 0.0 - 1.9 %    Differential Method Automated    Comprehensive metabolic panel   Result Value Ref Range    Sodium 136 136 - 145 mmol/L    Potassium 3.8 3.5 - 5.1 mmol/L    Chloride 104 95 - 110 mmol/L    CO2 17 (L) 23 - 29 mmol/L    Glucose 145 (H) 70 - 110 mg/dL    BUN 12 6 - 20 mg/dL    Creatinine 0.8 0.5 - 1.4 mg/dL    Calcium 9.9 8.7 - 10.5 mg/dL    Total Protein 8.3 6.0 - 8.4 g/dL    Albumin 3.1 (L) 3.5 - 5.2 g/dL    Total Bilirubin 3.4 (H) 0.1 - 1.0 mg/dL    Alkaline Phosphatase 128 55 - 135 U/L    AST 51 (H) 10 - 40 U/L    ALT 65 (H) 10 - 44 U/L    Anion Gap 15 8 - 16 mmol/L    eGFR >60 >60 mL/min/1.73 m^2   Brain natriuretic peptide   Result Value Ref Range    BNP <10 0 - 99 pg/mL   Troponin I   Result Value Ref Range    Troponin I <0.006 0.000 - 0.026 ng/mL   COVID-19 Rapid Screening   Result Value Ref Range    SARS-CoV-2 RNA,  Amplification, Qual Negative Negative   Lactic acid, plasma   Result Value Ref Range    Lactate (Lactic Acid) 1.3 0.5 - 2.2 mmol/L   D dimer, quantitative   Result Value Ref Range    D-Dimer 2.68 (H) <0.50 mg/L FEU   Urinalysis, Reflex to Urine Culture Urine, Clean Catch    Specimen: Urine   Result Value Ref Range    Specimen UA Urine, Clean Catch     Color, UA Orange (A) Yellow, Straw, Christina    Appearance, UA Clear Clear    pH, UA 7.0 5.0 - 8.0    Specific Gravity, UA >1.030 (A) 1.005 - 1.030    Protein, UA 2+ (A) Negative    Glucose, UA Trace (A) Negative    Ketones, UA Negative Negative    Bilirubin (UA) 2+ (A) Negative    Occult Blood UA Negative Negative    Nitrite, UA Negative Negative    Urobilinogen, UA >=8.0 (A) <2.0 EU/dL    Leukocytes, UA Negative Negative   Urinalysis Microscopic   Result Value Ref Range    RBC, UA 2 0 - 4 /hpf    WBC, UA 2 0 - 5 /hpf    WBC Clumps, UA Rare None-Rare    Bacteria None None-Occ /hpf    Hyaline Casts, UA 0 0-1/lpf /lpf    Microscopic Comment SEE COMMENT    Protein, Body Fluid (Reference Lab or Non-[facility]) [facility]; Pleural Fluid, Left   Result Value Ref Range    Protein, Fluid 5.8 See Comment g/dL   LDH, Body Fluid ([facility] or Non[facility]) [facility]; Pleural Fluid, Left   Result Value Ref Range    LD, Fluid 1256 See Comment U/L   Flow Cytometry Analysis (Body Fluid) Pleural Fluid   Result Value Ref Range    Fluid Type PLEURAL     Fluid Interpretation       No abnormal hematopoietic population is detected in this sample.  Correlate  clinically and with cytology.      Fluid Antibodies Analyzed       All analyzed: CD2, CD3, CD4, CD5, CD7, CD8, CD10, CD13, CD19, CD20, CD34,  , KAPPA, LAMBDA,CD45 and 7AAD.      Fluid Comment       Flow cytometric analysis of pleural fluid shows populations of polyclonal B  lymphocytes and T lymphocytes that are immunophenotypically unremarkable.  The blast gate is not increased.  Flow differential:  Lymphocytes 1.3%, Monocytes 12.6%,  Granulocytes  85.1%,  Blast  0.4%, Debris/nRBC 0.1%,  Viability 99.4%.     Glucose, Body Fluid ([facility] or Non[facility]) [facility]; Pleural Fluid, Left   Result Value Ref Range    Glucose, Fluid 37 See Comment mg/dL   WBC & Diff,Body Fluid Pleural Fluid, Left   Result Value Ref Range    Body Fluid Type Pleural Fluid, Left     Fluid Appearance Turbid     Fluid Color Christina     WBC, Body Fluid 53123 /cu mm    Segs, Fluid 30 %    Lymphs, Fluid 2 %    Monocytes/Macrophages, Fluid 9 %    Eos, Fluid 59 %   Comprehensive metabolic panel   Result Value Ref Range    Sodium 136 136 - 145 mmol/L    Potassium 3.6 3.5 - 5.1 mmol/L    Chloride 106 95 - 110 mmol/L    CO2 17 (L) 23 - 29 mmol/L    Glucose 126 (H) 70 - 110 mg/dL    BUN 14 6 - 20 mg/dL    Creatinine 0.8 0.5 - 1.4 mg/dL    Calcium 8.9 8.7 - 10.5 mg/dL    Total Protein 7.3 6.0 - 8.4 g/dL    Albumin 2.6 (L) 3.5 - 5.2 g/dL    Total Bilirubin 2.4 (H) 0.1 - 1.0 mg/dL    Alkaline Phosphatase 118 55 - 135 U/L    AST 57 (H) 10 - 40 U/L    ALT 63 (H) 10 - 44 U/L    Anion Gap 13 8 - 16 mmol/L    eGFR >60 >60 mL/min/1.73 m^2   CBC auto differential   Result Value Ref Range    WBC 16.95 (H) 3.90 - 12.70 K/uL    RBC 4.31 (L) 4.60 - 6.20 M/uL    Hemoglobin 11.7 (L) 14.0 - 18.0 g/dL    Hematocrit 34.8 (L) 40.0 - 54.0 %    MCV 81 (L) 82 - 98 fL    MCH 27.1 27.0 - 31.0 pg    MCHC 33.6 32.0 - 36.0 g/dL    RDW 12.1 11.5 - 14.5 %    Platelets 293 150 - 450 K/uL    MPV 10.3 9.2 - 12.9 fL    Immature Granulocytes 0.8 (H) 0.0 - 0.5 %    Gran # (ANC) 14.3 (H) 1.8 - 7.7 K/uL    Immature Grans (Abs) 0.13 (H) 0.00 - 0.04 K/uL    Lymph # 1.4 1.0 - 4.8 K/uL    Mono # 1.1 (H) 0.3 - 1.0 K/uL    Eos # 0.0 0.0 - 0.5 K/uL    Baso # 0.03 0.00 - 0.20 K/uL    nRBC 0 0 /100 WBC    Gran % 84.4 (H) 38.0 - 73.0 %    Lymph % 8.0 (L) 18.0 - 48.0 %    Mono % 6.5 4.0 - 15.0 %    Eosinophil % 0.1 0.0 - 8.0 %    Basophil % 0.2 0.0 - 1.9 %    Differential Method Automated    Pathologist Review of Laboratory Test    Result Value Ref Range    Pathologist Review, Body Fluid REVIEWED    Magnesium   Result Value Ref Range    Magnesium 1.9 1.6 - 2.6 mg/dL   Basic metabolic panel   Result Value Ref Range    Sodium 136 136 - 145 mmol/L    Potassium 3.7 3.5 - 5.1 mmol/L    Chloride 105 95 - 110 mmol/L    CO2 18 (L) 23 - 29 mmol/L    Glucose 100 70 - 110 mg/dL    BUN 12 6 - 20 mg/dL    Creatinine 0.8 0.5 - 1.4 mg/dL    Calcium 9.2 8.7 - 10.5 mg/dL    Anion Gap 13 8 - 16 mmol/L    eGFR >60 >60 mL/min/1.73 m^2   Drug screen panel, urine emergency   Result Value Ref Range    Benzodiazepines Negative Negative    Methadone metabolites Negative Negative    Cocaine (Metab.) Negative Negative    Opiate Scrn, Ur Presumptive Positive (A) Negative    Barbiturate Screen, Ur Negative Negative    Amphetamine Screen, Ur Negative Negative    THC Presumptive Positive (A) Negative    Phencyclidine Negative Negative    Creatinine, Urine 184.8 23.0 - 375.0 mg/dL    Toxicology Information SEE COMMENT    Protime-INR   Result Value Ref Range    Prothrombin Time 10.9 9.0 - 12.5 sec    INR 1.0 0.8 - 1.2   APTT   Result Value Ref Range    aPTT 30.0 21.0 - 32.0 sec   Hemoglobin A1c   Result Value Ref Range    Hemoglobin A1C 5.0 4.0 - 5.6 %    Estimated Avg Glucose 97 68 - 131 mg/dL   Prealbumin   Result Value Ref Range    Prealbumin 10 (L) 20 - 43 mg/dL   VANCOMYCIN, TROUGH   Result Value Ref Range    Vancomycin-Trough 7.4 (L) 10.0 - 22.0 ug/mL   Basic Metabolic Panel   Result Value Ref Range    Sodium 134 (L) 136 - 145 mmol/L    Potassium 3.9 3.5 - 5.1 mmol/L    Chloride 106 95 - 110 mmol/L    CO2 18 (L) 23 - 29 mmol/L    Glucose 97 70 - 110 mg/dL    BUN 11 6 - 20 mg/dL    Creatinine 0.7 0.5 - 1.4 mg/dL    Calcium 9.2 8.7 - 10.5 mg/dL    Anion Gap 10 8 - 16 mmol/L    eGFR >60 >60 mL/min/1.73 m^2   APTT   Result Value Ref Range    aPTT 28.4 21.0 - 32.0 sec   CBC auto differential   Result Value Ref Range    WBC 13.58 (H) 3.90 - 12.70 K/uL    RBC 4.40 (L) 4.60 -  6.20 M/uL    Hemoglobin 12.0 (L) 14.0 - 18.0 g/dL    Hematocrit 35.4 (L) 40.0 - 54.0 %    MCV 81 (L) 82 - 98 fL    MCH 27.3 27.0 - 31.0 pg    MCHC 33.9 32.0 - 36.0 g/dL    RDW 12.4 11.5 - 14.5 %    Platelets 336 150 - 450 K/uL    MPV 10.3 9.2 - 12.9 fL    Immature Granulocytes 0.5 0.0 - 0.5 %    Gran # (ANC) 10.6 (H) 1.8 - 7.7 K/uL    Immature Grans (Abs) 0.07 (H) 0.00 - 0.04 K/uL    Lymph # 1.8 1.0 - 4.8 K/uL    Mono # 0.9 0.3 - 1.0 K/uL    Eos # 0.1 0.0 - 0.5 K/uL    Baso # 0.03 0.00 - 0.20 K/uL    nRBC 0 0 /100 WBC    Gran % 78.4 (H) 38.0 - 73.0 %    Lymph % 13.3 (L) 18.0 - 48.0 %    Mono % 6.6 4.0 - 15.0 %    Eosinophil % 1.0 0.0 - 8.0 %    Basophil % 0.2 0.0 - 1.9 %    Differential Method Automated    Basic metabolic panel   Result Value Ref Range    Sodium 136 136 - 145 mmol/L    Potassium 4.0 3.5 - 5.1 mmol/L    Chloride 102 95 - 110 mmol/L    CO2 20 (L) 23 - 29 mmol/L    Glucose 119 (H) 70 - 110 mg/dL    BUN 9 6 - 20 mg/dL    Creatinine 0.8 0.5 - 1.4 mg/dL    Calcium 8.2 (L) 8.7 - 10.5 mg/dL    Anion Gap 14 8 - 16 mmol/L    eGFR >60 >60 mL/min/1.73 m^2   Magnesium   Result Value Ref Range    Magnesium 2.1 1.6 - 2.6 mg/dL   Procalcitonin   Result Value Ref Range    Procalcitonin 0.08 <0.25 ng/mL   Hepatic Function Panel   Result Value Ref Range    Total Protein 6.5 6.0 - 8.4 g/dL    Albumin 2.7 (L) 3.5 - 5.2 g/dL    Total Bilirubin 1.1 (H) 0.1 - 1.0 mg/dL    Bilirubin, Direct 0.6 (H) 0.1 - 0.3 mg/dL    AST 84 (H) 10 - 40 U/L     (H) 10 - 44 U/L    Alkaline Phosphatase 145 (H) 55 - 135 U/L   Basic metabolic panel   Result Value Ref Range    Sodium 140 136 - 145 mmol/L    Potassium 3.8 3.5 - 5.1 mmol/L    Chloride 106 95 - 110 mmol/L    CO2 21 (L) 23 - 29 mmol/L    Glucose 111 (H) 70 - 110 mg/dL    BUN 11 6 - 20 mg/dL    Creatinine 0.8 0.5 - 1.4 mg/dL    Calcium 8.8 8.7 - 10.5 mg/dL    Anion Gap 13 8 - 16 mmol/L    eGFR >60 >60 mL/min/1.73 m^2   Magnesium   Result Value Ref Range    Magnesium 2.0 1.6 -  2.6 mg/dL   Hepatic function panel   Result Value Ref Range    Total Protein 7.0 6.0 - 8.4 g/dL    Albumin 2.5 (L) 3.5 - 5.2 g/dL    Total Bilirubin 0.6 0.1 - 1.0 mg/dL    Bilirubin, Direct 0.4 (H) 0.1 - 0.3 mg/dL    AST 52 (H) 10 - 40 U/L     (H) 10 - 44 U/L    Alkaline Phosphatase 127 55 - 135 U/L   Basic metabolic panel   Result Value Ref Range    Sodium 139 136 - 145 mmol/L    Potassium 3.5 3.5 - 5.1 mmol/L    Chloride 107 95 - 110 mmol/L    CO2 21 (L) 23 - 29 mmol/L    Glucose 100 70 - 110 mg/dL    BUN 10 6 - 20 mg/dL    Creatinine 0.8 0.5 - 1.4 mg/dL    Calcium 8.9 8.7 - 10.5 mg/dL    Anion Gap 11 8 - 16 mmol/L    eGFR >60 >60 mL/min/1.73 m^2   Magnesium   Result Value Ref Range    Magnesium 2.0 1.6 - 2.6 mg/dL   CBC auto differential   Result Value Ref Range    WBC 15.38 (H) 3.90 - 12.70 K/uL    RBC 4.37 (L) 4.60 - 6.20 M/uL    Hemoglobin 12.0 (L) 14.0 - 18.0 g/dL    Hematocrit 36.2 (L) 40.0 - 54.0 %    MCV 83 82 - 98 fL    MCH 27.5 27.0 - 31.0 pg    MCHC 33.1 32.0 - 36.0 g/dL    RDW 12.5 11.5 - 14.5 %    Platelets 386 150 - 450 K/uL    MPV 9.6 9.2 - 12.9 fL    Immature Granulocytes 0.8 (H) 0.0 - 0.5 %    Gran # (ANC) 11.1 (H) 1.8 - 7.7 K/uL    Immature Grans (Abs) 0.12 (H) 0.00 - 0.04 K/uL    Lymph # 2.3 1.0 - 4.8 K/uL    Mono # 0.7 0.3 - 1.0 K/uL    Eos # 1.2 (H) 0.0 - 0.5 K/uL    Baso # 0.06 0.00 - 0.20 K/uL    nRBC 0 0 /100 WBC    Gran % 72.2 38.0 - 73.0 %    Lymph % 14.7 (L) 18.0 - 48.0 %    Mono % 4.3 4.0 - 15.0 %    Eosinophil % 7.6 0.0 - 8.0 %    Basophil % 0.4 0.0 - 1.9 %    Differential Method Automated    Type & Screen   Result Value Ref Range    Group & Rh O NEG     Indirect Rajendra NEG    Cytology, Fluid/Wash/Brush   Result Value Ref Range    Final Pathologic Diagnosis       Pleural fluid, left, cytology:  Negative for malignant cells.  Acute inflammation.  Blood present.      Disclaimer       Screening was performed at [facility], [address] [place] [address].           Significant Imaging: I have reviewed all pertinent imaging results/findings within the past 24 hours.    X-Ray Chest AP Single View   Final Result      No adverse change.  Tiny residual left apical pneumothorax.         Electronically signed by: Jairo Combs Jr., MD   Date:    12/03/2022   Time:    08:21      X-Ray Chest AP Single View   Final Result      No adverse interval change compared to 12/01/2022.         Electronically signed by: Garrett Rodríguez   Date:    12/02/2022   Time:    08:12      X-Ray Chest AP Single View   Final Result      Slightly increased patchy infiltrate left lower lobe which could reflect airspace disease versus aspiration or pulmonary contusion.  Continued follow-up is recommended.         Electronically signed by: Zane Kingston MD   Date:    12/01/2022   Time:    06:59      X-Ray Chest 1 View   Final Result      No acute process seen.         Electronically signed by: Zane Kingston MD   Date:    11/30/2022   Time:    12:34      X-Ray Chest AP Portable   Final Result      In comparison to the prior study, there is no adverse interval changes         Electronically signed by: Zane Kingston MD   Date:    11/28/2022   Time:    12:46      US Thoracentesis with Imaging, Aspiration Only   Final Result      Left-sided thoracentesis without evidence of immediate complication.         Electronically signed by: Zane Kingston MD   Date:    11/28/2022   Time:    12:45      US Abdomen Limited   Final Result      No acute abnormalities         Electronically signed by: Zane Kingston MD   Date:    11/28/2022   Time:    12:44      US Lower Extremity Veins Bilateral   Final Result      No evidence of deep venous thrombosis in either lower extremity.         Electronically signed by: Garrett Rodríguez   Date:    11/28/2022   Time:    09:40      CTA Chest Non-Coronary (PE Studies)   Final Result      No evidence of pulmonary embolism.  Limited bolus tracking.      Dense  consolidation seen throughout bilateral lower lobes.   Multiloculated left pleural effusion which is small to moderate.      See additional findings above      All CT scans at this facility use dose modulation, iterative reconstruction and/or weight based dosing when appropriate to reduce radiation dose to as low as reasonably achievable.         Electronically signed by: Zane Kingston MD   Date:    11/28/2022   Time:    09:02      X-ray Chest PA And Lateral   Final Result      Small to moderate left-sided pleural effusion with left basilar atelectasis or airspace disease.         Electronically signed by: Zane Kingston MD   Date:    11/28/2022   Time:    08:05      X-Ray Chest AP Portable   ED Interpretation   Left lower lobe infiltrate      Final Result      Small to moderate left-sided pleural effusion with left basilar atelectasis or airspace disease.         Electronically signed by: Zane Kingston MD   Date:    11/28/2022   Time:    07:58

## 2022-12-03 NOTE — ASSESSMENT & PLAN NOTE
The patient is a 37-year-old male with history of tobacco abuse who presented with shortness of breath.  CT scan shows left lung consolidation and a loculated parapneumonic collection.  Thoracentesis shows that the pleural fluid has a turbid appearance and has [place] at [address].cu mm.    The the findings of  loculated pleural effusion on CT, in the setting of consolidated left lower lobe, with an elevated WBC in turbid appearing fluid is compatible with a parapneumonic effusion most likely an empyema.  The patient is a candidate for a VATS procedure with evacuation of empyema.  The risks and benefits of the procedure was explained to the patient.  The patient understands the risks and benefits of surgery and has agreed to proceed with VATS with evacuation of empyema for 11/30/2022.    12/01/2022   The patient is postop day 1 status post left VATS procedure with evacuation of empyema and decortication.  Patient is complaining of significant pain at surgical site.  Will add Toradol to his pain management.  Continue chest tube to water seal.  Continue antibiotic treatment with pip-tazo.    12/02/2022   The patient is postop day 2 status post left VATS procedure and evacuation of empyema with decortication.  Patient has much better pain control.  Continue chest tube salt water seal.  Continue antibiotics.  Will discontinue chest tube when drainage is less than 50 mL in a 24 hour period.    12/03/2022   The patient is postop day 3 status post left VATS procedure with evacuation of empyema decortication.  Chest tube drainage is trending down.  Chest tube output was about 100 mL over the past 24 hours.  Continue antibiotics.  Continue chest tube to water seal.

## 2022-12-03 NOTE — SUBJECTIVE & OBJECTIVE
Interval History:  The patient is postop day 3 status post left VATS procedure with evacuation of empyema and decortication.    ROS  Medications:  Continuous Infusions:  Scheduled Meds:   acetaminophen  650 mg Oral Q6H    albuterol-ipratropium  3 mL Nebulization Q6H WAKE    ampicillin-sulbactim (UNASYN) IVPB  3 g Intravenous Q6H    docusate sodium  100 mg Oral BID    ketorolac  15 mg Intravenous Q8H    mupirocin   Nasal BID    polyethylene glycol  17 g Oral Daily    sertraline  50 mg Oral QHS    traZODone  50 mg Oral QHS     PRN Meds:albuterol-ipratropium, ALPRAZolam, diphenhydrAMINE, hydrOXYzine pamoate, influenza, melatonin, metoclopramide HCl, morphine, ondansetron, oxyCODONE, pneumoc 20-panfilo conj-dip cr(PF), sodium chloride 0.9%, sodium chloride 0.9%     Objective:     Vital Signs (Most Recent):  Temp: 98.2 °F (36.8 °C) (12/03/22 0732)  Pulse: 73 (12/03/22 0732)  Resp: 18 (12/03/22 0732)  BP: (!) 156/90 (12/03/22 0732)  SpO2: 97 % (12/03/22 0732)   Vital Signs (24h Range):  Temp:  [97.7 °F (36.5 °C)-98.6 °F (37 °C)] 98.2 °F (36.8 °C)  Pulse:  [65-91] 73  Resp:  [16-27] 18  SpO2:  [95 %-98 %] 97 %  BP: (134-156)/(77-90) 156/90     Weight: 87.9 kg (193 lb 12.6 oz)  Body mass index is 24.22 kg/m².    SpO2: 97 %  O2 Device (Oxygen Therapy): room air    Intake/Output - Last 3 Shifts         12/01 0700 12/02 0659 12/02 0700 12/03 0659 12/03 0700 12/04 0659    P.O. 500      I.V. (mL/kg) 64.7 (0.8)      IV Piggyback 398 296.4     Total Intake(mL/kg) 962.7 (11.2) 296.4 (3.4)     Urine (mL/kg/hr) 500 (0.2) 350 (0.2)     Stool 0 0     Chest Tube 90 75     Total Output 590 425     Net +372.7 -128.6            Urine Occurrence 2 x 2 x     Stool Occurrence 1 x 3 x             Lines/Drains/Airways       Drain  Duration                  Y Chest Tube 1 and 2 11/30/22 1143 1 Left Pleural 32 Fr. 2 Left Pleural 32 Fr. 2 days              Peripheral Intravenous Line  Duration                  Peripheral IV - Single Lumen 11/29/22  0035 20 G Anterior;Right Wrist 4 days                    Physical Exam  Constitutional:       Appearance: Normal appearance.   HENT:      Head: Normocephalic and atraumatic.   Cardiovascular:      Rate and Rhythm: Normal rate and regular rhythm.   Pulmonary:      Effort: Pulmonary effort is normal.      Breath sounds: Normal breath sounds.   Abdominal:      General: Abdomen is flat.      Palpations: Abdomen is soft.   Musculoskeletal:      Right lower leg: No edema.      Left lower leg: No edema.   Skin:     General: Skin is warm and dry.   Neurological:      Mental Status: He is alert and oriented to person, place, and time.   Psychiatric:         Behavior: Behavior normal.       Significant Labs:  All pertinent labs from the last 24 hours have been reviewed.    Significant Diagnostics:  I have reviewed all pertinent imaging results/findings within the past 24 hours.

## 2022-12-03 NOTE — PROGRESS NOTES
[facility] Medicine  Progress Note    Patient Name: Zane Hrone Jr.  MRN: [id number]  Patient Class: IP- Inpatient   Admission Date: 11/28/2022  Length of Stay: 5 days  Attending Physician: Marco Nguyễn, *  Primary Care Provider: Primary Doctor No        Subjective:     Principal Problem:Empyema        HPI:     Mr. Horne is a 37 year old male with a history of depression and tobacco use (1 pack per day x 15 years) who presents to the ED with complaints of worsening shortness of breath x 3 days assocaited with productive cough and chest pain, symptoms are constant and moderate in nature, no relieving or exacerbating factors.  Upon arrival in the ED, patient tachypneic, tachycardiac, o2 sats mid 80's on room air.  Placed on 2 liters with noted improvement.  Lab work notable for WBC 17, bili 3.4,ALT 65, AST 51, normal troponin and BNP.  EKG showed ST.  Lactic pending.  Chest xray showed PNA.  Patient was given bolus, blood cultures drawn, started on abx and breathing treatments.  He will be admitted to hospital medicine service for acute hypoxic resp failure/sepsis d/t pna.     At assessment, patient lying in bed, o2 sats stable on 2 liters, complaints of yellow tinged productive cough and chest pain that is worse with movement, cough or deep breath. Of note, earlier last month patient did have a lap cr and has since followed up with his surgeon and has been doing well since that time.     Code Status, Full  Surrogate Decision Maker, wife, Mary      Overview/Hospital Course:  36 y/o WM admitted with a DX of PNA  and  Loculated parapneumonic effusion . Started on IV zosyn and vanc . S/P Thoracentesis  -  250 ccs of dark anibal fluid removed  . Pleural effusion cell diff show a wbc > 30 k .PH and glucose pleural fluid pending .  CTS consulted  and performed a VATS on 11/30 ( Multiple loculated pockets of purulent fluid with fibrinous purulent adhesions.  Well developed rind over the  left lower lobe) . The blood cx and Pleural effusion  cx are NGTD .       Interval History:  CT output n 24 hrs 75 cc . NAEON . The blood and pleural fluid cx are NGTD .     Review of Systems   Constitutional:  Positive for activity change.   HENT: Negative.     Eyes: Negative.    Respiratory:  Negative for cough and shortness of breath.    Cardiovascular:  Negative for chest pain.   Gastrointestinal: Negative.    Endocrine: Negative.    Genitourinary: Negative.    Musculoskeletal:  Positive for back pain.   Skin: Negative.    Allergic/Immunologic: Negative.    Neurological: Negative.    Hematological: Negative.    Psychiatric/Behavioral:  Negative for dysphoric mood. The patient is nervous/anxious.    Objective:     Vital Signs (Most Recent):  Temp: 98.2 °F (36.8 °C) (12/03/22 0732)  Pulse: 73 (12/03/22 0732)  Resp: 18 (12/03/22 0732)  BP: (!) 156/90 (12/03/22 0732)  SpO2: 97 % (12/03/22 0732)   Vital Signs (24h Range):  Temp:  [97.7 °F (36.5 °C)-98.6 °F (37 °C)] 98.2 °F (36.8 °C)  Pulse:  [65-91] 73  Resp:  [16-35] 18  SpO2:  [95 %-98 %] 97 %  BP: (134-156)/(77-90) 156/90     Weight: 87.9 kg (193 lb 12.6 oz)  Body mass index is 24.22 kg/m².    Intake/Output Summary (Last 24 hours) at 12/3/2022 0948  Last data filed at 12/3/2022 0637  Gross per 24 hour   Intake 196.71 ml   Output 405 ml   Net -208.29 ml      Physical Exam  Constitutional:       Appearance: Normal appearance.   HENT:      Head: Normocephalic and atraumatic.      Mouth/Throat:      Mouth: Mucous membranes are moist.   Eyes:      Extraocular Movements: Extraocular movements intact.      Pupils: Pupils are equal, round, and reactive to light.   Cardiovascular:      Rate and Rhythm: Normal rate.      Pulses: Normal pulses.      Heart sounds: Normal heart sounds.   Pulmonary:      Effort: Pulmonary effort is normal.      Comments: Left side chest tube in place   Abdominal:      General: Abdomen is flat. Bowel sounds are normal.      Palpations: Abdomen  is soft.   Musculoskeletal:      Right lower leg: No edema.      Left lower leg: No edema.   Skin:     General: Skin is warm and dry.   Neurological:      Mental Status: He is alert and oriented to person, place, and time.   Psychiatric:         Behavior: Behavior normal.       Significant Labs: All pertinent labs within the past 24 hours have been reviewed.    Results for orders placed or performed during the hospital encounter of 11/28/22   Influenza A & B by Molecular    Specimen: Nasopharyngeal Swab   Result Value Ref Range    Influenza A, Molecular Negative Negative    Influenza B, Molecular Negative Negative    Flu A & B Source NP    Blood culture #1 **CANNOT BE ORDERED STAT**    Specimen: Peripheral, Antecubital, Left; Blood   Result Value Ref Range    Blood Culture, Routine No Growth to date     Blood Culture, Routine No Growth to date     Blood Culture, Routine No Growth to date     Blood Culture, Routine No Growth to date     Blood Culture, Routine No Growth to date    Blood culture #2 **CANNOT BE ORDERED STAT**    Specimen: Peripheral, Antecubital, Right; Blood   Result Value Ref Range    Blood Culture, Routine No Growth to date     Blood Culture, Routine No Growth to date     Blood Culture, Routine No Growth to date     Blood Culture, Routine No Growth to date     Blood Culture, Routine No Growth to date    Culture, Body Fluid (Aerobic) w/ GS    Specimen: Pleural Fluid   Result Value Ref Range    AEROBIC CULTURE - FLUID No growth     Gram Stain Result Rare WBC's     Gram Stain Result No organisms seen    AFB Culture & Smear    Specimen: Pleural Fluid   Result Value Ref Range    AFB Culture & Smear Culture in progress     AFB CULTURE STAIN No acid fast bacilli seen.    KOH prep    Specimen: Pleural Fluid   Result Value Ref Range    KOH Prep No yeast or fungal elements seen    Culture, MRSA    Specimen: Nares, Right; MRSA source   Result Value Ref Range    MRSA Surveillance Screen No MRSA isolated     Culture, Anaerobe    Specimen: Pleural Fluid   Result Value Ref Range    Anaerobic Culture Culture in progress    AFB Culture & Smear    Specimen: Pleural Fluid   Result Value Ref Range    AFB Culture & Smear Culture in progress     AFB CULTURE STAIN No acid fast bacilli seen.    Aerobic culture    Specimen: Pleural Fluid   Result Value Ref Range    Aerobic Bacterial Culture No growth    CBC auto differential   Result Value Ref Range    WBC 17.76 (H) 3.90 - 12.70 K/uL    RBC 4.93 4.60 - 6.20 M/uL    Hemoglobin 13.7 (L) 14.0 - 18.0 g/dL    Hematocrit 39.2 (L) 40.0 - 54.0 %    MCV 80 (L) 82 - 98 fL    MCH 27.8 27.0 - 31.0 pg    MCHC 34.9 32.0 - 36.0 g/dL    RDW 12.2 11.5 - 14.5 %    Platelets 287 150 - 450 K/uL    MPV 9.5 9.2 - 12.9 fL    Immature Granulocytes 0.4 0.0 - 0.5 %    Gran # (ANC) 15.7 (H) 1.8 - 7.7 K/uL    Immature Grans (Abs) 0.07 (H) 0.00 - 0.04 K/uL    Lymph # 0.7 (L) 1.0 - 4.8 K/uL    Mono # 1.2 (H) 0.3 - 1.0 K/uL    Eos # 0.0 0.0 - 0.5 K/uL    Baso # 0.03 0.00 - 0.20 K/uL    nRBC 0 0 /100 WBC    Gran % 88.3 (H) 38.0 - 73.0 %    Lymph % 4.1 (L) 18.0 - 48.0 %    Mono % 6.9 4.0 - 15.0 %    Eosinophil % 0.1 0.0 - 8.0 %    Basophil % 0.2 0.0 - 1.9 %    Differential Method Automated    Comprehensive metabolic panel   Result Value Ref Range    Sodium 136 136 - 145 mmol/L    Potassium 3.8 3.5 - 5.1 mmol/L    Chloride 104 95 - 110 mmol/L    CO2 17 (L) 23 - 29 mmol/L    Glucose 145 (H) 70 - 110 mg/dL    BUN 12 6 - 20 mg/dL    Creatinine 0.8 0.5 - 1.4 mg/dL    Calcium 9.9 8.7 - 10.5 mg/dL    Total Protein 8.3 6.0 - 8.4 g/dL    Albumin 3.1 (L) 3.5 - 5.2 g/dL    Total Bilirubin 3.4 (H) 0.1 - 1.0 mg/dL    Alkaline Phosphatase 128 55 - 135 U/L    AST 51 (H) 10 - 40 U/L    ALT 65 (H) 10 - 44 U/L    Anion Gap 15 8 - 16 mmol/L    eGFR >60 >60 mL/min/1.73 m^2   Brain natriuretic peptide   Result Value Ref Range    BNP <10 0 - 99 pg/mL   Troponin I   Result Value Ref Range    Troponin I <0.006 0.000 - 0.026 ng/mL    COVID-19 Rapid Screening   Result Value Ref Range    SARS-CoV-2 RNA, Amplification, Qual Negative Negative   Lactic acid, plasma   Result Value Ref Range    Lactate (Lactic Acid) 1.3 0.5 - 2.2 mmol/L   D dimer, quantitative   Result Value Ref Range    D-Dimer 2.68 (H) <0.50 mg/L FEU   Urinalysis, Reflex to Urine Culture Urine, Clean Catch    Specimen: Urine   Result Value Ref Range    Specimen UA Urine, Clean Catch     Color, UA Orange (A) Yellow, Straw, Christina    Appearance, UA Clear Clear    pH, UA 7.0 5.0 - 8.0    Specific Gravity, UA >1.030 (A) 1.005 - 1.030    Protein, UA 2+ (A) Negative    Glucose, UA Trace (A) Negative    Ketones, UA Negative Negative    Bilirubin (UA) 2+ (A) Negative    Occult Blood UA Negative Negative    Nitrite, UA Negative Negative    Urobilinogen, UA >=8.0 (A) <2.0 EU/dL    Leukocytes, UA Negative Negative   Urinalysis Microscopic   Result Value Ref Range    RBC, UA 2 0 - 4 /hpf    WBC, UA 2 0 - 5 /hpf    WBC Clumps, UA Rare None-Rare    Bacteria None None-Occ /hpf    Hyaline Casts, UA 0 0-1/lpf /lpf    Microscopic Comment SEE COMMENT    Protein, Body Fluid (Reference Lab or Non-[facility]) [facility]; Pleural Fluid, Left   Result Value Ref Range    Protein, Fluid 5.8 See Comment g/dL   LDH, Body Fluid (Reference Lab or Non[facility]) [facility]; Pleural Fluid, Left   Result Value Ref Range    LD, Fluid 1256 See Comment U/L   Flow Cytometry Analysis (Body Fluid) Pleural Fluid   Result Value Ref Range    Fluid Type PLEURAL     Fluid Interpretation       No abnormal hematopoietic population is detected in this sample.  Correlate  clinically and with cytology.      Fluid Antibodies Analyzed       All analyzed: CD2, CD3, CD4, CD5, CD7, CD8, CD10, CD13, CD19, CD20, CD34,  , KAPPA, LAMBDA,CD45 and 7AAD.      Fluid Comment       Flow cytometric analysis of pleural fluid shows populations of polyclonal B  lymphocytes and T lymphocytes that are immunophenotypically unremarkable.  The blast gate is  not increased.  Flow differential:  Lymphocytes 1.3%, Monocytes 12.6%, Granulocytes  85.1%,  Blast  0.4%, Debris/nRBC 0.1%,  Viability 99.4%.     Glucose, Body Fluid (Reference Lab or Non[facility]) [facility]; Pleural Fluid, Left   Result Value Ref Range    Glucose, Fluid 37 See Comment mg/dL   WBC & Diff,Body Fluid Pleural Fluid, Left   Result Value Ref Range    Body Fluid Type Pleural Fluid, Left     Fluid Appearance Turbid     Fluid Color Christina     WBC, Body Fluid 00202 /cu mm    Segs, Fluid 30 %    Lymphs, Fluid 2 %    Monocytes/Macrophages, Fluid 9 %    Eos, Fluid 59 %   Comprehensive metabolic panel   Result Value Ref Range    Sodium 136 136 - 145 mmol/L    Potassium 3.6 3.5 - 5.1 mmol/L    Chloride 106 95 - 110 mmol/L    CO2 17 (L) 23 - 29 mmol/L    Glucose 126 (H) 70 - 110 mg/dL    BUN 14 6 - 20 mg/dL    Creatinine 0.8 0.5 - 1.4 mg/dL    Calcium 8.9 8.7 - 10.5 mg/dL    Total Protein 7.3 6.0 - 8.4 g/dL    Albumin 2.6 (L) 3.5 - 5.2 g/dL    Total Bilirubin 2.4 (H) 0.1 - 1.0 mg/dL    Alkaline Phosphatase 118 55 - 135 U/L    AST 57 (H) 10 - 40 U/L    ALT 63 (H) 10 - 44 U/L    Anion Gap 13 8 - 16 mmol/L    eGFR >60 >60 mL/min/1.73 m^2   CBC auto differential   Result Value Ref Range    WBC 16.95 (H) 3.90 - 12.70 K/uL    RBC 4.31 (L) 4.60 - 6.20 M/uL    Hemoglobin 11.7 (L) 14.0 - 18.0 g/dL    Hematocrit 34.8 (L) 40.0 - 54.0 %    MCV 81 (L) 82 - 98 fL    MCH 27.1 27.0 - 31.0 pg    MCHC 33.6 32.0 - 36.0 g/dL    RDW 12.1 11.5 - 14.5 %    Platelets 293 150 - 450 K/uL    MPV 10.3 9.2 - 12.9 fL    Immature Granulocytes 0.8 (H) 0.0 - 0.5 %    Gran # (ANC) 14.3 (H) 1.8 - 7.7 K/uL    Immature Grans (Abs) 0.13 (H) 0.00 - 0.04 K/uL    Lymph # 1.4 1.0 - 4.8 K/uL    Mono # 1.1 (H) 0.3 - 1.0 K/uL    Eos # 0.0 0.0 - 0.5 K/uL    Baso # 0.03 0.00 - 0.20 K/uL    nRBC 0 0 /100 WBC    Gran % 84.4 (H) 38.0 - 73.0 %    Lymph % 8.0 (L) 18.0 - 48.0 %    Mono % 6.5 4.0 - 15.0 %    Eosinophil % 0.1 0.0 - 8.0 %    Basophil % 0.2 0.0 - 1.9 %     Differential Method Automated    Pathologist Review of Laboratory Test   Result Value Ref Range    Pathologist Review, Body Fluid REVIEWED    Magnesium   Result Value Ref Range    Magnesium 1.9 1.6 - 2.6 mg/dL   Basic metabolic panel   Result Value Ref Range    Sodium 136 136 - 145 mmol/L    Potassium 3.7 3.5 - 5.1 mmol/L    Chloride 105 95 - 110 mmol/L    CO2 18 (L) 23 - 29 mmol/L    Glucose 100 70 - 110 mg/dL    BUN 12 6 - 20 mg/dL    Creatinine 0.8 0.5 - 1.4 mg/dL    Calcium 9.2 8.7 - 10.5 mg/dL    Anion Gap 13 8 - 16 mmol/L    eGFR >60 >60 mL/min/1.73 m^2   Drug screen panel, urine emergency   Result Value Ref Range    Benzodiazepines Negative Negative    Methadone metabolites Negative Negative    Cocaine (Metab.) Negative Negative    Opiate Scrn, Ur Presumptive Positive (A) Negative    Barbiturate Screen, Ur Negative Negative    Amphetamine Screen, Ur Negative Negative    THC Presumptive Positive (A) Negative    Phencyclidine Negative Negative    Creatinine, Urine 184.8 23.0 - 375.0 mg/dL    Toxicology Information SEE COMMENT    Protime-INR   Result Value Ref Range    Prothrombin Time 10.9 9.0 - 12.5 sec    INR 1.0 0.8 - 1.2   APTT   Result Value Ref Range    aPTT 30.0 21.0 - 32.0 sec   Hemoglobin A1c   Result Value Ref Range    Hemoglobin A1C 5.0 4.0 - 5.6 %    Estimated Avg Glucose 97 68 - 131 mg/dL   Prealbumin   Result Value Ref Range    Prealbumin 10 (L) 20 - 43 mg/dL   VANCOMYCIN, TROUGH   Result Value Ref Range    Vancomycin-Trough 7.4 (L) 10.0 - 22.0 ug/mL   Basic Metabolic Panel   Result Value Ref Range    Sodium 134 (L) 136 - 145 mmol/L    Potassium 3.9 3.5 - 5.1 mmol/L    Chloride 106 95 - 110 mmol/L    CO2 18 (L) 23 - 29 mmol/L    Glucose 97 70 - 110 mg/dL    BUN 11 6 - 20 mg/dL    Creatinine 0.7 0.5 - 1.4 mg/dL    Calcium 9.2 8.7 - 10.5 mg/dL    Anion Gap 10 8 - 16 mmol/L    eGFR >60 >60 mL/min/1.73 m^2   APTT   Result Value Ref Range    aPTT 28.4 21.0 - 32.0 sec   CBC auto differential   Result  Value Ref Range    WBC 13.58 (H) 3.90 - 12.70 K/uL    RBC 4.40 (L) 4.60 - 6.20 M/uL    Hemoglobin 12.0 (L) 14.0 - 18.0 g/dL    Hematocrit 35.4 (L) 40.0 - 54.0 %    MCV 81 (L) 82 - 98 fL    MCH 27.3 27.0 - 31.0 pg    MCHC 33.9 32.0 - 36.0 g/dL    RDW 12.4 11.5 - 14.5 %    Platelets 336 150 - 450 K/uL    MPV 10.3 9.2 - 12.9 fL    Immature Granulocytes 0.5 0.0 - 0.5 %    Gran # (ANC) 10.6 (H) 1.8 - 7.7 K/uL    Immature Grans (Abs) 0.07 (H) 0.00 - 0.04 K/uL    Lymph # 1.8 1.0 - 4.8 K/uL    Mono # 0.9 0.3 - 1.0 K/uL    Eos # 0.1 0.0 - 0.5 K/uL    Baso # 0.03 0.00 - 0.20 K/uL    nRBC 0 0 /100 WBC    Gran % 78.4 (H) 38.0 - 73.0 %    Lymph % 13.3 (L) 18.0 - 48.0 %    Mono % 6.6 4.0 - 15.0 %    Eosinophil % 1.0 0.0 - 8.0 %    Basophil % 0.2 0.0 - 1.9 %    Differential Method Automated    Basic metabolic panel   Result Value Ref Range    Sodium 136 136 - 145 mmol/L    Potassium 4.0 3.5 - 5.1 mmol/L    Chloride 102 95 - 110 mmol/L    CO2 20 (L) 23 - 29 mmol/L    Glucose 119 (H) 70 - 110 mg/dL    BUN 9 6 - 20 mg/dL    Creatinine 0.8 0.5 - 1.4 mg/dL    Calcium 8.2 (L) 8.7 - 10.5 mg/dL    Anion Gap 14 8 - 16 mmol/L    eGFR >60 >60 mL/min/1.73 m^2   Magnesium   Result Value Ref Range    Magnesium 2.1 1.6 - 2.6 mg/dL   Procalcitonin   Result Value Ref Range    Procalcitonin 0.08 <0.25 ng/mL   Hepatic Function Panel   Result Value Ref Range    Total Protein 6.5 6.0 - 8.4 g/dL    Albumin 2.7 (L) 3.5 - 5.2 g/dL    Total Bilirubin 1.1 (H) 0.1 - 1.0 mg/dL    Bilirubin, Direct 0.6 (H) 0.1 - 0.3 mg/dL    AST 84 (H) 10 - 40 U/L     (H) 10 - 44 U/L    Alkaline Phosphatase 145 (H) 55 - 135 U/L   Basic metabolic panel   Result Value Ref Range    Sodium 140 136 - 145 mmol/L    Potassium 3.8 3.5 - 5.1 mmol/L    Chloride 106 95 - 110 mmol/L    CO2 21 (L) 23 - 29 mmol/L    Glucose 111 (H) 70 - 110 mg/dL    BUN 11 6 - 20 mg/dL    Creatinine 0.8 0.5 - 1.4 mg/dL    Calcium 8.8 8.7 - 10.5 mg/dL    Anion Gap 13 8 - 16 mmol/L    eGFR >60 >60  mL/min/1.73 m^2   Magnesium   Result Value Ref Range    Magnesium 2.0 1.6 - 2.6 mg/dL   Hepatic function panel   Result Value Ref Range    Total Protein 7.0 6.0 - 8.4 g/dL    Albumin 2.5 (L) 3.5 - 5.2 g/dL    Total Bilirubin 0.6 0.1 - 1.0 mg/dL    Bilirubin, Direct 0.4 (H) 0.1 - 0.3 mg/dL    AST 52 (H) 10 - 40 U/L     (H) 10 - 44 U/L    Alkaline Phosphatase 127 55 - 135 U/L   Basic metabolic panel   Result Value Ref Range    Sodium 139 136 - 145 mmol/L    Potassium 3.5 3.5 - 5.1 mmol/L    Chloride 107 95 - 110 mmol/L    CO2 21 (L) 23 - 29 mmol/L    Glucose 100 70 - 110 mg/dL    BUN 10 6 - 20 mg/dL    Creatinine 0.8 0.5 - 1.4 mg/dL    Calcium 8.9 8.7 - 10.5 mg/dL    Anion Gap 11 8 - 16 mmol/L    eGFR >60 >60 mL/min/1.73 m^2   Magnesium   Result Value Ref Range    Magnesium 2.0 1.6 - 2.6 mg/dL   CBC auto differential   Result Value Ref Range    WBC 15.38 (H) 3.90 - 12.70 K/uL    RBC 4.37 (L) 4.60 - 6.20 M/uL    Hemoglobin 12.0 (L) 14.0 - 18.0 g/dL    Hematocrit 36.2 (L) 40.0 - 54.0 %    MCV 83 82 - 98 fL    MCH 27.5 27.0 - 31.0 pg    MCHC 33.1 32.0 - 36.0 g/dL    RDW 12.5 11.5 - 14.5 %    Platelets 386 150 - 450 K/uL    MPV 9.6 9.2 - 12.9 fL    Immature Granulocytes 0.8 (H) 0.0 - 0.5 %    Gran # (ANC) 11.1 (H) 1.8 - 7.7 K/uL    Immature Grans (Abs) 0.12 (H) 0.00 - 0.04 K/uL    Lymph # 2.3 1.0 - 4.8 K/uL    Mono # 0.7 0.3 - 1.0 K/uL    Eos # 1.2 (H) 0.0 - 0.5 K/uL    Baso # 0.06 0.00 - 0.20 K/uL    nRBC 0 0 /100 WBC    Gran % 72.2 38.0 - 73.0 %    Lymph % 14.7 (L) 18.0 - 48.0 %    Mono % 4.3 4.0 - 15.0 %    Eosinophil % 7.6 0.0 - 8.0 %    Basophil % 0.4 0.0 - 1.9 %    Differential Method Automated    Type & Screen   Result Value Ref Range    Group & Rh O NEG     Indirect Rajendra NEG    Cytology, Fluid/Wash/Brush   Result Value Ref Range    Final Pathologic Diagnosis       Pleural fluid, left, cytology:  Negative for malignant cells.  Acute inflammation.  Blood present.      Disclaimer       Screening was  performed at [facility], [address] [place] [address].           Significant Imaging: I have reviewed all pertinent imaging results/findings within the past 24 hours.    X-Ray Chest AP Single View   Final Result      No adverse change.  Tiny residual left apical pneumothorax.         Electronically signed by: Jairo Combs Jr., MD   Date:    12/03/2022   Time:    08:21      X-Ray Chest AP Single View   Final Result      No adverse interval change compared to 12/01/2022.         Electronically signed by: Garrett Rodríguez   Date:    12/02/2022   Time:    08:12      X-Ray Chest AP Single View   Final Result      Slightly increased patchy infiltrate left lower lobe which could reflect airspace disease versus aspiration or pulmonary contusion.  Continued follow-up is recommended.         Electronically signed by: Zane Kingston MD   Date:    12/01/2022   Time:    06:59      X-Ray Chest 1 View   Final Result      No acute process seen.         Electronically signed by: Zane Kingston MD   Date:    11/30/2022   Time:    12:34      X-Ray Chest AP Portable   Final Result      In comparison to the prior study, there is no adverse interval changes         Electronically signed by: Zane Kingstno MD   Date:    11/28/2022   Time:    12:46      US Thoracentesis with Imaging, Aspiration Only   Final Result      Left-sided thoracentesis without evidence of immediate complication.         Electronically signed by: Zane Kingston MD   Date:    11/28/2022   Time:    12:45      US Abdomen Limited   Final Result      No acute abnormalities         Electronically signed by: Zane Kingston MD   Date:    11/28/2022   Time:    12:44      US Lower Extremity Veins Bilateral   Final Result      No evidence of deep venous thrombosis in either lower extremity.         Electronically signed by: Garrett Rodríguez   Date:    11/28/2022   Time:    09:40      CTA Chest Non-Coronary (PE Studies)   Final Result       No evidence of pulmonary embolism.  Limited bolus tracking.      Dense consolidation seen throughout bilateral lower lobes.   Multiloculated left pleural effusion which is small to moderate.      See additional findings above      All CT scans at this facility use dose modulation, iterative reconstruction and/or weight based dosing when appropriate to reduce radiation dose to as low as reasonably achievable.         Electronically signed by: Zane Kingston MD   Date:    11/28/2022   Time:    09:02      X-ray Chest PA And Lateral   Final Result      Small to moderate left-sided pleural effusion with left basilar atelectasis or airspace disease.         Electronically signed by: Zane Kingston MD   Date:    11/28/2022   Time:    08:05      X-Ray Chest AP Portable   ED Interpretation   Left lower lobe infiltrate      Final Result      Small to moderate left-sided pleural effusion with left basilar atelectasis or airspace disease.         Electronically signed by: Zane Kingston MD   Date:    11/28/2022   Time:    07:58              Assessment/Plan:      * Empyema  - Etiology unknown .   (+) for Rotten  Teeth    With no sign of active infection .    -S/P Thoracentesis which sow a > 30 l wbc   -Cont IVAB  -Blood and fluid Cxs NGTD    -CTS consulted . S/p VATS =  Multiple loculated pockets of purulent fluid with fibrinous purulent adhesions.  Well developed rind over the left lower lobe.  -CT in place . Will be d/c when outpt is less than 500 daily     Tobacco use  -1 pack per day x 15 years approx  -decline nicotine patch  counseled on cessation      Sepsis without acute organ dysfunction  2/2 to PNA an empyema  Cont IVAB  Blood and pleural fluid cx NGTD        Chest pain on breathing  - likely pleuritic in nature, worse with cough/deep breath  - tend troponin, have been negative thus far  - po pain medication prn      PNA (pneumonia)  - IV abx   -Loculated left pleural effusion   -S/P VATS       Acute respiratory  failure with hypoxia  Patient with Hypoxic Respiratory failure which is Acute.  he is not on home oxygen. Supplemental oxygen was provided and noted-  .   Signs/symptoms of respiratory failure include- tachypnea and increased work of breathing. Contributing diagnoses includes - Pneumonia Labs and images were reviewed. Patient Has not had a recent ABG. Will treat underlying causes and adjust management of respiratory failure as follows.  - continue iv abx for pna  - duo nebs as needed  - wean o2 to keep sats > 90%        VTE Risk Mitigation (From admission, onward)         Ordered     IP VTE LOW RISK PATIENT  Once         11/29/22 1917     Place SILVINO hose  Until discontinued         11/29/22 1917     Place sequential compression device  Until discontinued         11/28/22 0424                Discharge Planning   IVY:      Code Status: Full Code   Is the patient medically ready for discharge?:     Reason for patient still in hospital (select all that apply): Treatment  Discharge Plan A: Home, Home with family                  Marco Nguyễn MD  Department of Hospital Medicine   [facility] - Med Surg

## 2022-12-03 NOTE — PLAN OF CARE
AAOx4. Family @ bedside. Chest tube to gravity water seal, intact, clean, and dry. Pain is being managed. Pt remained free from fall and injury. No sign of any distress noted. Safety precautions alert. Pt asked to call for assistance if needed. IV access clean dry and intact. Chart checked reviewed. VSS. Plan of care continued. Chart and orders reviewed.

## 2022-12-04 LAB
ANION GAP SERPL CALC-SCNC: 11 MMOL/L (ref 8–16)
BASOPHILS # BLD AUTO: 0.05 K/UL (ref 0–0.2)
BASOPHILS NFR BLD: 0.4 % (ref 0–1.9)
BUN SERPL-MCNC: 11 MG/DL (ref 6–20)
CALCIUM SERPL-MCNC: 8.6 MG/DL (ref 8.7–10.5)
CHLORIDE SERPL-SCNC: 106 MMOL/L (ref 95–110)
CO2 SERPL-SCNC: 22 MMOL/L (ref 23–29)
CREAT SERPL-MCNC: 0.7 MG/DL (ref 0.5–1.4)
DIFFERENTIAL METHOD: ABNORMAL
EOSINOPHIL # BLD AUTO: 1.1 K/UL (ref 0–0.5)
EOSINOPHIL NFR BLD: 9.6 % (ref 0–8)
ERYTHROCYTE [DISTWIDTH] IN BLOOD BY AUTOMATED COUNT: 12.6 % (ref 11.5–14.5)
EST. GFR  (NO RACE VARIABLE): >60 ML/MIN/1.73 M^2
GLUCOSE SERPL-MCNC: 90 MG/DL (ref 70–110)
HCT VFR BLD AUTO: 35.4 % (ref 40–54)
HGB BLD-MCNC: 11.7 G/DL (ref 14–18)
IMM GRANULOCYTES # BLD AUTO: 0.14 K/UL (ref 0–0.04)
IMM GRANULOCYTES NFR BLD AUTO: 1.2 % (ref 0–0.5)
LYMPHOCYTES # BLD AUTO: 1.8 K/UL (ref 1–4.8)
LYMPHOCYTES NFR BLD: 16 % (ref 18–48)
MAGNESIUM SERPL-MCNC: 2 MG/DL (ref 1.6–2.6)
MCH RBC QN AUTO: 27.3 PG (ref 27–31)
MCHC RBC AUTO-ENTMCNC: 33.1 G/DL (ref 32–36)
MCV RBC AUTO: 83 FL (ref 82–98)
MONOCYTES # BLD AUTO: 0.6 K/UL (ref 0.3–1)
MONOCYTES NFR BLD: 4.8 % (ref 4–15)
NEUTROPHILS # BLD AUTO: 7.8 K/UL (ref 1.8–7.7)
NEUTROPHILS NFR BLD: 68 % (ref 38–73)
NRBC BLD-RTO: 0 /100 WBC
PLATELET # BLD AUTO: 384 K/UL (ref 150–450)
PMV BLD AUTO: 9.8 FL (ref 9.2–12.9)
POTASSIUM SERPL-SCNC: 4.7 MMOL/L (ref 3.5–5.1)
RBC # BLD AUTO: 4.28 M/UL (ref 4.6–6.2)
SODIUM SERPL-SCNC: 139 MMOL/L (ref 136–145)
WBC # BLD AUTO: 11.43 K/UL (ref 3.9–12.7)

## 2022-12-04 PROCEDURE — 83735 ASSAY OF MAGNESIUM: CPT | Performed by: THORACIC SURGERY (CARDIOTHORACIC VASCULAR SURGERY)

## 2022-12-04 PROCEDURE — 36415 COLL VENOUS BLD VENIPUNCTURE: CPT | Performed by: THORACIC SURGERY (CARDIOTHORACIC VASCULAR SURGERY)

## 2022-12-04 PROCEDURE — 80048 BASIC METABOLIC PNL TOTAL CA: CPT | Performed by: THORACIC SURGERY (CARDIOTHORACIC VASCULAR SURGERY)

## 2022-12-04 PROCEDURE — 63600175 PHARM REV CODE 636 W HCPCS: Performed by: THORACIC SURGERY (CARDIOTHORACIC VASCULAR SURGERY)

## 2022-12-04 PROCEDURE — 25000003 PHARM REV CODE 250: Performed by: NURSE PRACTITIONER

## 2022-12-04 PROCEDURE — 94640 AIRWAY INHALATION TREATMENT: CPT

## 2022-12-04 PROCEDURE — 25000242 PHARM REV CODE 250 ALT 637 W/ HCPCS: Performed by: THORACIC SURGERY (CARDIOTHORACIC VASCULAR SURGERY)

## 2022-12-04 PROCEDURE — 85025 COMPLETE CBC W/AUTO DIFF WBC: CPT | Performed by: INTERNAL MEDICINE

## 2022-12-04 PROCEDURE — 11000001 HC ACUTE MED/SURG PRIVATE ROOM

## 2022-12-04 PROCEDURE — 25000003 PHARM REV CODE 250: Performed by: THORACIC SURGERY (CARDIOTHORACIC VASCULAR SURGERY)

## 2022-12-04 PROCEDURE — 25000003 PHARM REV CODE 250: Performed by: INTERNAL MEDICINE

## 2022-12-04 PROCEDURE — 21400001 HC TELEMETRY ROOM

## 2022-12-04 PROCEDURE — 94760 N-INVAS EAR/PLS OXIMETRY 1: CPT

## 2022-12-04 PROCEDURE — 63600175 PHARM REV CODE 636 W HCPCS: Performed by: INTERNAL MEDICINE

## 2022-12-04 PROCEDURE — 94761 N-INVAS EAR/PLS OXIMETRY MLT: CPT

## 2022-12-04 RX ORDER — HYDRALAZINE HYDROCHLORIDE 20 MG/ML
10 INJECTION INTRAMUSCULAR; INTRAVENOUS EVERY 6 HOURS PRN
Status: DISCONTINUED | OUTPATIENT
Start: 2022-12-04 | End: 2022-12-05 | Stop reason: HOSPADM

## 2022-12-04 RX ORDER — AMLODIPINE BESYLATE 5 MG/1
5 TABLET ORAL DAILY
Status: DISCONTINUED | OUTPATIENT
Start: 2022-12-04 | End: 2022-12-05 | Stop reason: HOSPADM

## 2022-12-04 RX ADMIN — SODIUM CHLORIDE 3 G: 9 INJECTION, SOLUTION INTRAVENOUS at 12:12

## 2022-12-04 RX ADMIN — SERTRALINE HYDROCHLORIDE 50 MG: 50 TABLET ORAL at 09:12

## 2022-12-04 RX ADMIN — OXYCODONE HYDROCHLORIDE 5 MG: 5 TABLET ORAL at 07:12

## 2022-12-04 RX ADMIN — MORPHINE SULFATE 1 MG: 2 INJECTION, SOLUTION INTRAMUSCULAR; INTRAVENOUS at 01:12

## 2022-12-04 RX ADMIN — IPRATROPIUM BROMIDE AND ALBUTEROL SULFATE 3 ML: 2.5; .5 SOLUTION RESPIRATORY (INHALATION) at 01:12

## 2022-12-04 RX ADMIN — SODIUM CHLORIDE 3 G: 9 INJECTION, SOLUTION INTRAVENOUS at 06:12

## 2022-12-04 RX ADMIN — OXYCODONE HYDROCHLORIDE 5 MG: 5 TABLET ORAL at 02:12

## 2022-12-04 RX ADMIN — SODIUM CHLORIDE 3 G: 9 INJECTION, SOLUTION INTRAVENOUS at 11:12

## 2022-12-04 RX ADMIN — OXYCODONE HYDROCHLORIDE 5 MG: 5 TABLET ORAL at 09:12

## 2022-12-04 RX ADMIN — TRAZODONE HYDROCHLORIDE 50 MG: 50 TABLET ORAL at 09:12

## 2022-12-04 RX ADMIN — IPRATROPIUM BROMIDE AND ALBUTEROL SULFATE 3 ML: 2.5; .5 SOLUTION RESPIRATORY (INHALATION) at 08:12

## 2022-12-04 RX ADMIN — AMLODIPINE BESYLATE 5 MG: 5 TABLET ORAL at 04:12

## 2022-12-04 RX ADMIN — MORPHINE SULFATE 1 MG: 2 INJECTION, SOLUTION INTRAMUSCULAR; INTRAVENOUS at 06:12

## 2022-12-04 RX ADMIN — IPRATROPIUM BROMIDE AND ALBUTEROL SULFATE 3 ML: 2.5; .5 SOLUTION RESPIRATORY (INHALATION) at 07:12

## 2022-12-04 NOTE — ASSESSMENT & PLAN NOTE
- Etiology unknown .   (+) for Rotten  Teeth    With no sign of active infection .    -S/P Thoracentesis which sow a > 30 l wbc   -Cont IVAB  -Blood and fluid Cxs NGTD    -CTS consulted . S/p VATS =  Multiple loculated pockets of purulent fluid with fibrinous purulent adhesions.  Well developed rind over the left lower lobe.  -CT in place . Will be d/c when outpt is less than 50 cc daily

## 2022-12-04 NOTE — SUBJECTIVE & OBJECTIVE
Interval History:     Review of Systems   Constitutional:  Positive for activity change.   HENT: Negative.     Eyes: Negative.    Respiratory:  Negative for cough and shortness of breath.    Cardiovascular:  Negative for chest pain.   Gastrointestinal: Negative.    Endocrine: Negative.    Genitourinary: Negative.    Musculoskeletal:  Positive for back pain.   Skin: Negative.    Allergic/Immunologic: Negative.    Neurological: Negative.    Hematological: Negative.    Psychiatric/Behavioral:  Negative for dysphoric mood. The patient is nervous/anxious.    Objective:     Vital Signs (Most Recent):  Temp: 98.3 °F (36.8 °C) (12/04/22 1038)  Pulse: 97 (12/04/22 1333)  Resp: 16 (12/04/22 1333)  BP: (!) 143/88 (12/04/22 1038)  SpO2: 98 % (12/04/22 1333)   Vital Signs (24h Range):  Temp:  [97.6 °F (36.4 °C)-98.7 °F (37.1 °C)] 98.3 °F (36.8 °C)  Pulse:  [73-97] 97  Resp:  [] 16  SpO2:  [95 %-99 %] 98 %  BP: (143-177)/() 143/88     Weight: 88.6 kg (195 lb 5.2 oz)  Body mass index is 24.41 kg/m².    Intake/Output Summary (Last 24 hours) at 12/4/2022 1533  Last data filed at 12/4/2022 1256  Gross per 24 hour   Intake 951.89 ml   Output 345 ml   Net 606.89 ml      Physical Exam  Constitutional:       Appearance: Normal appearance.   HENT:      Head: Normocephalic and atraumatic.      Mouth/Throat:      Mouth: Mucous membranes are moist.   Eyes:      Extraocular Movements: Extraocular movements intact.      Pupils: Pupils are equal, round, and reactive to light.   Cardiovascular:      Rate and Rhythm: Normal rate.      Pulses: Normal pulses.      Heart sounds: Normal heart sounds.   Pulmonary:      Effort: Pulmonary effort is normal.      Comments: Left side chest tube in place   Abdominal:      General: Abdomen is flat. Bowel sounds are normal.      Palpations: Abdomen is soft.   Musculoskeletal:      Right lower leg: No edema.      Left lower leg: No edema.   Skin:     General: Skin is warm and dry.   Neurological:       Mental Status: He is alert and oriented to person, place, and time.   Psychiatric:         Behavior: Behavior normal.       Significant Labs: All pertinent labs within the past 24 hours have been reviewed.    Results for orders placed or performed during the hospital encounter of 11/28/22   Influenza A & B by Molecular    Specimen: Nasopharyngeal Swab   Result Value Ref Range    Influenza A, Molecular Negative Negative    Influenza B, Molecular Negative Negative    Flu A & B Source NP    Blood culture #1 **CANNOT BE ORDERED STAT**    Specimen: Peripheral, Antecubital, Left; Blood   Result Value Ref Range    Blood Culture, Routine No growth after 5 days.    Blood culture #2 **CANNOT BE ORDERED STAT**    Specimen: Peripheral, Antecubital, Right; Blood   Result Value Ref Range    Blood Culture, Routine No growth after 5 days.    Culture, Body Fluid (Aerobic) w/ GS    Specimen: Pleural Fluid   Result Value Ref Range    AEROBIC CULTURE - FLUID No growth     Gram Stain Result Rare WBC's     Gram Stain Result No organisms seen    AFB Culture & Smear    Specimen: Pleural Fluid   Result Value Ref Range    AFB Culture & Smear Culture in progress     AFB CULTURE STAIN No acid fast bacilli seen.    KOH prep    Specimen: Pleural Fluid   Result Value Ref Range    KOH Prep No yeast or fungal elements seen    Culture, MRSA    Specimen: Nares, Right; MRSA source   Result Value Ref Range    MRSA Surveillance Screen No MRSA isolated    Culture, Anaerobe    Specimen: Pleural Fluid   Result Value Ref Range    Anaerobic Culture Culture in progress    AFB Culture & Smear    Specimen: Pleural Fluid   Result Value Ref Range    AFB Culture & Smear Culture in progress     AFB CULTURE STAIN No acid fast bacilli seen.    Aerobic culture    Specimen: Pleural Fluid   Result Value Ref Range    Aerobic Bacterial Culture No growth    CBC auto differential   Result Value Ref Range    WBC 17.76 (H) 3.90 - 12.70 K/uL    RBC 4.93 4.60 - 6.20 M/uL     Hemoglobin 13.7 (L) 14.0 - 18.0 g/dL    Hematocrit 39.2 (L) 40.0 - 54.0 %    MCV 80 (L) 82 - 98 fL    MCH 27.8 27.0 - 31.0 pg    MCHC 34.9 32.0 - 36.0 g/dL    RDW 12.2 11.5 - 14.5 %    Platelets 287 150 - 450 K/uL    MPV 9.5 9.2 - 12.9 fL    Immature Granulocytes 0.4 0.0 - 0.5 %    Gran # (ANC) 15.7 (H) 1.8 - 7.7 K/uL    Immature Grans (Abs) 0.07 (H) 0.00 - 0.04 K/uL    Lymph # 0.7 (L) 1.0 - 4.8 K/uL    Mono # 1.2 (H) 0.3 - 1.0 K/uL    Eos # 0.0 0.0 - 0.5 K/uL    Baso # 0.03 0.00 - 0.20 K/uL    nRBC 0 0 /100 WBC    Gran % 88.3 (H) 38.0 - 73.0 %    Lymph % 4.1 (L) 18.0 - 48.0 %    Mono % 6.9 4.0 - 15.0 %    Eosinophil % 0.1 0.0 - 8.0 %    Basophil % 0.2 0.0 - 1.9 %    Differential Method Automated    Comprehensive metabolic panel   Result Value Ref Range    Sodium 136 136 - 145 mmol/L    Potassium 3.8 3.5 - 5.1 mmol/L    Chloride 104 95 - 110 mmol/L    CO2 17 (L) 23 - 29 mmol/L    Glucose 145 (H) 70 - 110 mg/dL    BUN 12 6 - 20 mg/dL    Creatinine 0.8 0.5 - 1.4 mg/dL    Calcium 9.9 8.7 - 10.5 mg/dL    Total Protein 8.3 6.0 - 8.4 g/dL    Albumin 3.1 (L) 3.5 - 5.2 g/dL    Total Bilirubin 3.4 (H) 0.1 - 1.0 mg/dL    Alkaline Phosphatase 128 55 - 135 U/L    AST 51 (H) 10 - 40 U/L    ALT 65 (H) 10 - 44 U/L    Anion Gap 15 8 - 16 mmol/L    eGFR >60 >60 mL/min/1.73 m^2   Brain natriuretic peptide   Result Value Ref Range    BNP <10 0 - 99 pg/mL   Troponin I   Result Value Ref Range    Troponin I <0.006 0.000 - 0.026 ng/mL   COVID-19 Rapid Screening   Result Value Ref Range    SARS-CoV-2 RNA, Amplification, Qual Negative Negative   Lactic acid, plasma   Result Value Ref Range    Lactate (Lactic Acid) 1.3 0.5 - 2.2 mmol/L   D dimer, quantitative   Result Value Ref Range    D-Dimer 2.68 (H) <0.50 mg/L FEU   Urinalysis, Reflex to Urine Culture Urine, Clean Catch    Specimen: Urine   Result Value Ref Range    Specimen UA Urine, Clean Catch     Color, UA Orange (A) Yellow, Straw, Christina    Appearance, UA Clear Clear    pH, UA  7.0 5.0 - 8.0    Specific Gravity, UA >1.030 (A) 1.005 - 1.030    Protein, UA 2+ (A) Negative    Glucose, UA Trace (A) Negative    Ketones, UA Negative Negative    Bilirubin (UA) 2+ (A) Negative    Occult Blood UA Negative Negative    Nitrite, UA Negative Negative    Urobilinogen, UA >=8.0 (A) <2.0 EU/dL    Leukocytes, UA Negative Negative   Urinalysis Microscopic   Result Value Ref Range    RBC, UA 2 0 - 4 /hpf    WBC, UA 2 0 - 5 /hpf    WBC Clumps, UA Rare None-Rare    Bacteria None None-Occ /hpf    Hyaline Casts, UA 0 0-1/lpf /lpf    Microscopic Comment SEE COMMENT    Protein, Body Fluid (Reference [facility] or Non[facility]) [facility]; Pleural Fluid, Left   Result Value Ref Range    Protein, Fluid 5.8 See Comment g/dL   LDH, Body Fluid ([facility] or [facility]) [facility]; Pleural Fluid, Left   Result Value Ref Range    LD, Fluid 1256 See Comment U/L   Flow Cytometry Analysis (Body Fluid) Pleural Fluid   Result Value Ref Range    Fluid Type PLEURAL     Fluid Interpretation       No abnormal hematopoietic population is detected in this sample.  Correlate  clinically and with cytology.      Fluid Antibodies Analyzed       All analyzed: CD2, CD3, CD4, CD5, CD7, CD8, CD10, CD13, CD19, CD20, CD34,  , KAPPA, LAMBDA,CD45 and 7AAD.      Fluid Comment       Flow cytometric analysis of pleural fluid shows populations of polyclonal B  lymphocytes and T lymphocytes that are immunophenotypically unremarkable.  The blast gate is not increased.  Flow differential:  Lymphocytes 1.3%, Monocytes 12.6%, Granulocytes  85.1%,  Blast  0.4%, Debris/nRBC 0.1%,  Viability 99.4%.     Glucose, Body Fluid ([facility] or Non[facility]) [facility]; Pleural Fluid, Left   Result Value Ref Range    Glucose, Fluid 37 See Comment mg/dL   WBC & Diff,Body Fluid Pleural Fluid, Left   Result Value Ref Range    Body Fluid Type Pleural Fluid, Left     Fluid Appearance Turbid     Fluid Color Christina     WBC, Body Fluid 66294 /cu mm    Segs, Fluid 30 %     Lymphs, Fluid 2 %    Monocytes/Macrophages, Fluid 9 %    Eos, Fluid 59 %   Comprehensive metabolic panel   Result Value Ref Range    Sodium 136 136 - 145 mmol/L    Potassium 3.6 3.5 - 5.1 mmol/L    Chloride 106 95 - 110 mmol/L    CO2 17 (L) 23 - 29 mmol/L    Glucose 126 (H) 70 - 110 mg/dL    BUN 14 6 - 20 mg/dL    Creatinine 0.8 0.5 - 1.4 mg/dL    Calcium 8.9 8.7 - 10.5 mg/dL    Total Protein 7.3 6.0 - 8.4 g/dL    Albumin 2.6 (L) 3.5 - 5.2 g/dL    Total Bilirubin 2.4 (H) 0.1 - 1.0 mg/dL    Alkaline Phosphatase 118 55 - 135 U/L    AST 57 (H) 10 - 40 U/L    ALT 63 (H) 10 - 44 U/L    Anion Gap 13 8 - 16 mmol/L    eGFR >60 >60 mL/min/1.73 m^2   CBC auto differential   Result Value Ref Range    WBC 16.95 (H) 3.90 - 12.70 K/uL    RBC 4.31 (L) 4.60 - 6.20 M/uL    Hemoglobin 11.7 (L) 14.0 - 18.0 g/dL    Hematocrit 34.8 (L) 40.0 - 54.0 %    MCV 81 (L) 82 - 98 fL    MCH 27.1 27.0 - 31.0 pg    MCHC 33.6 32.0 - 36.0 g/dL    RDW 12.1 11.5 - 14.5 %    Platelets 293 150 - 450 K/uL    MPV 10.3 9.2 - 12.9 fL    Immature Granulocytes 0.8 (H) 0.0 - 0.5 %    Gran # (ANC) 14.3 (H) 1.8 - 7.7 K/uL    Immature Grans (Abs) 0.13 (H) 0.00 - 0.04 K/uL    Lymph # 1.4 1.0 - 4.8 K/uL    Mono # 1.1 (H) 0.3 - 1.0 K/uL    Eos # 0.0 0.0 - 0.5 K/uL    Baso # 0.03 0.00 - 0.20 K/uL    nRBC 0 0 /100 WBC    Gran % 84.4 (H) 38.0 - 73.0 %    Lymph % 8.0 (L) 18.0 - 48.0 %    Mono % 6.5 4.0 - 15.0 %    Eosinophil % 0.1 0.0 - 8.0 %    Basophil % 0.2 0.0 - 1.9 %    Differential Method Automated    Pathologist Review of Laboratory Test   Result Value Ref Range    Pathologist Review, Body Fluid REVIEWED    Magnesium   Result Value Ref Range    Magnesium 1.9 1.6 - 2.6 mg/dL   Basic metabolic panel   Result Value Ref Range    Sodium 136 136 - 145 mmol/L    Potassium 3.7 3.5 - 5.1 mmol/L    Chloride 105 95 - 110 mmol/L    CO2 18 (L) 23 - 29 mmol/L    Glucose 100 70 - 110 mg/dL    BUN 12 6 - 20 mg/dL    Creatinine 0.8 0.5 - 1.4 mg/dL    Calcium 9.2 8.7 - 10.5 mg/dL     Anion Gap 13 8 - 16 mmol/L    eGFR >60 >60 mL/min/1.73 m^2   Drug screen panel, urine emergency   Result Value Ref Range    Benzodiazepines Negative Negative    Methadone metabolites Negative Negative    Cocaine (Metab.) Negative Negative    Opiate Scrn, Ur Presumptive Positive (A) Negative    Barbiturate Screen, Ur Negative Negative    Amphetamine Screen, Ur Negative Negative    THC Presumptive Positive (A) Negative    Phencyclidine Negative Negative    Creatinine, Urine 184.8 23.0 - 375.0 mg/dL    Toxicology Information SEE COMMENT    Protime-INR   Result Value Ref Range    Prothrombin Time 10.9 9.0 - 12.5 sec    INR 1.0 0.8 - 1.2   APTT   Result Value Ref Range    aPTT 30.0 21.0 - 32.0 sec   Hemoglobin A1c   Result Value Ref Range    Hemoglobin A1C 5.0 4.0 - 5.6 %    Estimated Avg Glucose 97 68 - 131 mg/dL   Prealbumin   Result Value Ref Range    Prealbumin 10 (L) 20 - 43 mg/dL   VANCOMYCIN, TROUGH   Result Value Ref Range    Vancomycin-Trough 7.4 (L) 10.0 - 22.0 ug/mL   Basic Metabolic Panel   Result Value Ref Range    Sodium 134 (L) 136 - 145 mmol/L    Potassium 3.9 3.5 - 5.1 mmol/L    Chloride 106 95 - 110 mmol/L    CO2 18 (L) 23 - 29 mmol/L    Glucose 97 70 - 110 mg/dL    BUN 11 6 - 20 mg/dL    Creatinine 0.7 0.5 - 1.4 mg/dL    Calcium 9.2 8.7 - 10.5 mg/dL    Anion Gap 10 8 - 16 mmol/L    eGFR >60 >60 mL/min/1.73 m^2   APTT   Result Value Ref Range    aPTT 28.4 21.0 - 32.0 sec   CBC auto differential   Result Value Ref Range    WBC 13.58 (H) 3.90 - 12.70 K/uL    RBC 4.40 (L) 4.60 - 6.20 M/uL    Hemoglobin 12.0 (L) 14.0 - 18.0 g/dL    Hematocrit 35.4 (L) 40.0 - 54.0 %    MCV 81 (L) 82 - 98 fL    MCH 27.3 27.0 - 31.0 pg    MCHC 33.9 32.0 - 36.0 g/dL    RDW 12.4 11.5 - 14.5 %    Platelets 336 150 - 450 K/uL    MPV 10.3 9.2 - 12.9 fL    Immature Granulocytes 0.5 0.0 - 0.5 %    Gran # (ANC) 10.6 (H) 1.8 - 7.7 K/uL    Immature Grans (Abs) 0.07 (H) 0.00 - 0.04 K/uL    Lymph # 1.8 1.0 - 4.8 K/uL    Mono # 0.9 0.3  - 1.0 K/uL    Eos # 0.1 0.0 - 0.5 K/uL    Baso # 0.03 0.00 - 0.20 K/uL    nRBC 0 0 /100 WBC    Gran % 78.4 (H) 38.0 - 73.0 %    Lymph % 13.3 (L) 18.0 - 48.0 %    Mono % 6.6 4.0 - 15.0 %    Eosinophil % 1.0 0.0 - 8.0 %    Basophil % 0.2 0.0 - 1.9 %    Differential Method Automated    Basic metabolic panel   Result Value Ref Range    Sodium 136 136 - 145 mmol/L    Potassium 4.0 3.5 - 5.1 mmol/L    Chloride 102 95 - 110 mmol/L    CO2 20 (L) 23 - 29 mmol/L    Glucose 119 (H) 70 - 110 mg/dL    BUN 9 6 - 20 mg/dL    Creatinine 0.8 0.5 - 1.4 mg/dL    Calcium 8.2 (L) 8.7 - 10.5 mg/dL    Anion Gap 14 8 - 16 mmol/L    eGFR >60 >60 mL/min/1.73 m^2   Magnesium   Result Value Ref Range    Magnesium 2.1 1.6 - 2.6 mg/dL   Procalcitonin   Result Value Ref Range    Procalcitonin 0.08 <0.25 ng/mL   Hepatic Function Panel   Result Value Ref Range    Total Protein 6.5 6.0 - 8.4 g/dL    Albumin 2.7 (L) 3.5 - 5.2 g/dL    Total Bilirubin 1.1 (H) 0.1 - 1.0 mg/dL    Bilirubin, Direct 0.6 (H) 0.1 - 0.3 mg/dL    AST 84 (H) 10 - 40 U/L     (H) 10 - 44 U/L    Alkaline Phosphatase 145 (H) 55 - 135 U/L   Basic metabolic panel   Result Value Ref Range    Sodium 140 136 - 145 mmol/L    Potassium 3.8 3.5 - 5.1 mmol/L    Chloride 106 95 - 110 mmol/L    CO2 21 (L) 23 - 29 mmol/L    Glucose 111 (H) 70 - 110 mg/dL    BUN 11 6 - 20 mg/dL    Creatinine 0.8 0.5 - 1.4 mg/dL    Calcium 8.8 8.7 - 10.5 mg/dL    Anion Gap 13 8 - 16 mmol/L    eGFR >60 >60 mL/min/1.73 m^2   Magnesium   Result Value Ref Range    Magnesium 2.0 1.6 - 2.6 mg/dL   Hepatic function panel   Result Value Ref Range    Total Protein 7.0 6.0 - 8.4 g/dL    Albumin 2.5 (L) 3.5 - 5.2 g/dL    Total Bilirubin 0.6 0.1 - 1.0 mg/dL    Bilirubin, Direct 0.4 (H) 0.1 - 0.3 mg/dL    AST 52 (H) 10 - 40 U/L     (H) 10 - 44 U/L    Alkaline Phosphatase 127 55 - 135 U/L   Basic metabolic panel   Result Value Ref Range    Sodium 139 136 - 145 mmol/L    Potassium 3.5 3.5 - 5.1 mmol/L    Chloride  107 95 - 110 mmol/L    CO2 21 (L) 23 - 29 mmol/L    Glucose 100 70 - 110 mg/dL    BUN 10 6 - 20 mg/dL    Creatinine 0.8 0.5 - 1.4 mg/dL    Calcium 8.9 8.7 - 10.5 mg/dL    Anion Gap 11 8 - 16 mmol/L    eGFR >60 >60 mL/min/1.73 m^2   Magnesium   Result Value Ref Range    Magnesium 2.0 1.6 - 2.6 mg/dL   CBC auto differential   Result Value Ref Range    WBC 15.38 (H) 3.90 - 12.70 K/uL    RBC 4.37 (L) 4.60 - 6.20 M/uL    Hemoglobin 12.0 (L) 14.0 - 18.0 g/dL    Hematocrit 36.2 (L) 40.0 - 54.0 %    MCV 83 82 - 98 fL    MCH 27.5 27.0 - 31.0 pg    MCHC 33.1 32.0 - 36.0 g/dL    RDW 12.5 11.5 - 14.5 %    Platelets 386 150 - 450 K/uL    MPV 9.6 9.2 - 12.9 fL    Immature Granulocytes 0.8 (H) 0.0 - 0.5 %    Gran # (ANC) 11.1 (H) 1.8 - 7.7 K/uL    Immature Grans (Abs) 0.12 (H) 0.00 - 0.04 K/uL    Lymph # 2.3 1.0 - 4.8 K/uL    Mono # 0.7 0.3 - 1.0 K/uL    Eos # 1.2 (H) 0.0 - 0.5 K/uL    Baso # 0.06 0.00 - 0.20 K/uL    nRBC 0 0 /100 WBC    Gran % 72.2 38.0 - 73.0 %    Lymph % 14.7 (L) 18.0 - 48.0 %    Mono % 4.3 4.0 - 15.0 %    Eosinophil % 7.6 0.0 - 8.0 %    Basophil % 0.4 0.0 - 1.9 %    Differential Method Automated    Basic metabolic panel   Result Value Ref Range    Sodium 139 136 - 145 mmol/L    Potassium 4.7 3.5 - 5.1 mmol/L    Chloride 106 95 - 110 mmol/L    CO2 22 (L) 23 - 29 mmol/L    Glucose 90 70 - 110 mg/dL    BUN 11 6 - 20 mg/dL    Creatinine 0.7 0.5 - 1.4 mg/dL    Calcium 8.6 (L) 8.7 - 10.5 mg/dL    Anion Gap 11 8 - 16 mmol/L    eGFR >60 >60 mL/min/1.73 m^2   Magnesium   Result Value Ref Range    Magnesium 2.0 1.6 - 2.6 mg/dL   CBC auto differential   Result Value Ref Range    WBC 11.43 3.90 - 12.70 K/uL    RBC 4.28 (L) 4.60 - 6.20 M/uL    Hemoglobin 11.7 (L) 14.0 - 18.0 g/dL    Hematocrit 35.4 (L) 40.0 - 54.0 %    MCV 83 82 - 98 fL    MCH 27.3 27.0 - 31.0 pg    MCHC 33.1 32.0 - 36.0 g/dL    RDW 12.6 11.5 - 14.5 %    Platelets 384 150 - 450 K/uL    MPV 9.8 9.2 - 12.9 fL    Immature Granulocytes 1.2 (H) 0.0 - 0.5 %     Gran # (ANC) 7.8 (H) 1.8 - 7.7 K/uL    Immature Grans (Abs) 0.14 (H) 0.00 - 0.04 K/uL    Lymph # 1.8 1.0 - 4.8 K/uL    Mono # 0.6 0.3 - 1.0 K/uL    Eos # 1.1 (H) 0.0 - 0.5 K/uL    Baso # 0.05 0.00 - 0.20 K/uL    nRBC 0 0 /100 WBC    Gran % 68.0 38.0 - 73.0 %    Lymph % 16.0 (L) 18.0 - 48.0 %    Mono % 4.8 4.0 - 15.0 %    Eosinophil % 9.6 (H) 0.0 - 8.0 %    Basophil % 0.4 0.0 - 1.9 %    Differential Method Automated    Type & Screen   Result Value Ref Range    Group & Rh O NEG     Indirect Rajendra NEG    Cytology, Fluid/Wash/Brush   Result Value Ref Range    Final Pathologic Diagnosis       Pleural fluid, left, cytology:  Negative for malignant cells.  Acute inflammation.  Blood present.      Disclaimer       Screening was performed at [facility], [address], [place] [address].           Significant Imaging: I have reviewed all pertinent imaging results/findings within the past 24 hours.    X-Ray Chest AP Portable   Final Result   Abnormal      Interval increase in size of a left pneumothorax as above.      This report was flagged in Epic as abnormal.         Electronically signed by: Renny Mayo   Date:    12/04/2022   Time:    10:05      X-Ray Chest AP Single View   Final Result      No adverse change.  Tiny residual left apical pneumothorax.         Electronically signed by: Jairo Combs Jr., MD   Date:    12/03/2022   Time:    08:21      X-Ray Chest AP Single View   Final Result      No adverse interval change compared to 12/01/2022.         Electronically signed by: Garrett Rodríguez   Date:    12/02/2022   Time:    08:12      X-Ray Chest AP Single View   Final Result      Slightly increased patchy infiltrate left lower lobe which could reflect airspace disease versus aspiration or pulmonary contusion.  Continued follow-up is recommended.         Electronically signed by: Zane Kingston MD   Date:    12/01/2022   Time:    06:59      X-Ray Chest 1 View   Final  Result      No acute process seen.         Electronically signed by: Zane Kingston MD   Date:    11/30/2022   Time:    12:34      X-Ray Chest AP Portable   Final Result      In comparison to the prior study, there is no adverse interval changes         Electronically signed by: Zane Kingston MD   Date:    11/28/2022   Time:    12:46      US Thoracentesis with Imaging, Aspiration Only   Final Result      Left-sided thoracentesis without evidence of immediate complication.         Electronically signed by: Zane Kingston MD   Date:    11/28/2022   Time:    12:45      US Abdomen Limited   Final Result      No acute abnormalities         Electronically signed by: Zane Kingston MD   Date:    11/28/2022   Time:    12:44      US Lower Extremity Veins Bilateral   Final Result      No evidence of deep venous thrombosis in either lower extremity.         Electronically signed by: Garrett Rodríguez   Date:    11/28/2022   Time:    09:40      CTA Chest Non-Coronary (PE Studies)   Final Result      No evidence of pulmonary embolism.  Limited bolus tracking.      Dense consolidation seen throughout bilateral lower lobes.   Multiloculated left pleural effusion which is small to moderate.      See additional findings above      All CT scans at this facility use dose modulation, iterative reconstruction and/or weight based dosing when appropriate to reduce radiation dose to as low as reasonably achievable.         Electronically signed by: Zane Kingston MD   Date:    11/28/2022   Time:    09:02      X-ray Chest PA And Lateral   Final Result      Small to moderate left-sided pleural effusion with left basilar atelectasis or airspace disease.         Electronically signed by: Zane Kingston MD   Date:    11/28/2022   Time:    08:05      X-Ray Chest AP Portable   ED Interpretation   Left lower lobe infiltrate      Final Result      Small to moderate left-sided pleural effusion with left basilar atelectasis or airspace disease.          Electronically signed by: Zane Kingston MD   Date:    11/28/2022   Time:    07:58

## 2022-12-04 NOTE — PROGRESS NOTES
Addended by: CHER FRANCIS on: 5/25/2022 10:55 AM     Modules accepted: Orders     [facility] Medicine  Progress Note    Patient Name: Zane Horne Jr.  MRN: [id number]  Patient Class: IP- Inpatient   Admission Date: 11/28/2022  Length of Stay: 6 days  Attending Physician: Marco Nguyễn, *  Primary Care Provider: Primary Doctor No        Subjective:     Principal Problem:Empyema        HPI:     Mr. Horne is a 37 year old male with a history of depression and tobacco use (1 pack per day x 15 years) who presents to the ED with complaints of worsening shortness of breath x 3 days assocaited with productive cough and chest pain, symptoms are constant and moderate in nature, no relieving or exacerbating factors.  Upon arrival in the ED, patient tachypneic, tachycardiac, o2 sats mid 80's on room air.  Placed on 2 liters with noted improvement.  Lab work notable for WBC 17, bili 3.4,ALT 65, AST 51, normal troponin and BNP.  EKG showed ST.  Lactic pending.  Chest xray showed PNA.  Patient was given bolus, blood cultures drawn, started on abx and breathing treatments.  He will be admitted to hospital medicine service for acute hypoxic resp failure/sepsis d/t pna.     At assessment, patient lying in bed, o2 sats stable on 2 liters, complaints of yellow tinged productive cough and chest pain that is worse with movement, cough or deep breath. Of note, earlier last month patient did have a lap cr and has since followed up with his surgeon and has been doing well since that time.     Code Status, Full  Surrogate Decision Maker, wife, Mary      Overview/Hospital Course:  36 y/o WM admitted with a DX of PNA  and  Loculated parapneumonic effusion . Started on IV zosyn and vanc . S/P Thoracentesis  -  250 ccs of dark anibal fluid removed  . Pleural effusion cell diff show a wbc > 30 k .PH and glucose pleural fluid pending .  CTS consulted  and performed a VATS on 11/30 ( Multiple loculated pockets of purulent fluid with fibrinous purulent adhesions.  Well developed rind over the  left lower lobe) . The blood cx and Pleural effusion  cx are NGTD .       Interval History:     Review of Systems   Constitutional:  Positive for activity change.   HENT: Negative.     Eyes: Negative.    Respiratory:  Negative for cough and shortness of breath.    Cardiovascular:  Negative for chest pain.   Gastrointestinal: Negative.    Endocrine: Negative.    Genitourinary: Negative.    Musculoskeletal:  Positive for back pain.   Skin: Negative.    Allergic/Immunologic: Negative.    Neurological: Negative.    Hematological: Negative.    Psychiatric/Behavioral:  Negative for dysphoric mood. The patient is nervous/anxious.    Objective:     Vital Signs (Most Recent):  Temp: 98.3 °F (36.8 °C) (12/04/22 1038)  Pulse: 97 (12/04/22 1333)  Resp: 16 (12/04/22 1333)  BP: (!) 143/88 (12/04/22 1038)  SpO2: 98 % (12/04/22 1333)   Vital Signs (24h Range):  Temp:  [97.6 °F (36.4 °C)-98.7 °F (37.1 °C)] 98.3 °F (36.8 °C)  Pulse:  [73-97] 97  Resp:  [] 16  SpO2:  [95 %-99 %] 98 %  BP: (143-177)/() 143/88     Weight: 88.6 kg (195 lb 5.2 oz)  Body mass index is 24.41 kg/m².    Intake/Output Summary (Last 24 hours) at 12/4/2022 1533  Last data filed at 12/4/2022 1256  Gross per 24 hour   Intake 951.89 ml   Output 345 ml   Net 606.89 ml      Physical Exam  Constitutional:       Appearance: Normal appearance.   HENT:      Head: Normocephalic and atraumatic.      Mouth/Throat:      Mouth: Mucous membranes are moist.   Eyes:      Extraocular Movements: Extraocular movements intact.      Pupils: Pupils are equal, round, and reactive to light.   Cardiovascular:      Rate and Rhythm: Normal rate.      Pulses: Normal pulses.      Heart sounds: Normal heart sounds.   Pulmonary:      Effort: Pulmonary effort is normal.      Comments: Left side chest tube in place   Abdominal:      General: Abdomen is flat. Bowel sounds are normal.      Palpations: Abdomen is soft.   Musculoskeletal:      Right lower leg: No edema.      Left lower  leg: No edema.   Skin:     General: Skin is warm and dry.   Neurological:      Mental Status: He is alert and oriented to person, place, and time.   Psychiatric:         Behavior: Behavior normal.       Significant Labs: All pertinent labs within the past 24 hours have been reviewed.    Results for orders placed or performed during the hospital encounter of 11/28/22   Influenza A & B by Molecular    Specimen: Nasopharyngeal Swab   Result Value Ref Range    Influenza A, Molecular Negative Negative    Influenza B, Molecular Negative Negative    Flu A & B Source NP    Blood culture #1 **CANNOT BE ORDERED STAT**    Specimen: Peripheral, Antecubital, Left; Blood   Result Value Ref Range    Blood Culture, Routine No growth after 5 days.    Blood culture #2 **CANNOT BE ORDERED STAT**    Specimen: Peripheral, Antecubital, Right; Blood   Result Value Ref Range    Blood Culture, Routine No growth after 5 days.    Culture, Body Fluid (Aerobic) w/ GS    Specimen: Pleural Fluid   Result Value Ref Range    AEROBIC CULTURE - FLUID No growth     Gram Stain Result Rare WBC's     Gram Stain Result No organisms seen    AFB Culture & Smear    Specimen: Pleural Fluid   Result Value Ref Range    AFB Culture & Smear Culture in progress     AFB CULTURE STAIN No acid fast bacilli seen.    KOH prep    Specimen: Pleural Fluid   Result Value Ref Range    KOH Prep No yeast or fungal elements seen    Culture, MRSA    Specimen: Nares, Right; MRSA source   Result Value Ref Range    MRSA Surveillance Screen No MRSA isolated    Culture, Anaerobe    Specimen: Pleural Fluid   Result Value Ref Range    Anaerobic Culture Culture in progress    AFB Culture & Smear    Specimen: Pleural Fluid   Result Value Ref Range    AFB Culture & Smear Culture in progress     AFB CULTURE STAIN No acid fast bacilli seen.    Aerobic culture    Specimen: Pleural Fluid   Result Value Ref Range    Aerobic Bacterial Culture No growth    CBC auto differential   Result Value  Ref Range    WBC 17.76 (H) 3.90 - 12.70 K/uL    RBC 4.93 4.60 - 6.20 M/uL    Hemoglobin 13.7 (L) 14.0 - 18.0 g/dL    Hematocrit 39.2 (L) 40.0 - 54.0 %    MCV 80 (L) 82 - 98 fL    MCH 27.8 27.0 - 31.0 pg    MCHC 34.9 32.0 - 36.0 g/dL    RDW 12.2 11.5 - 14.5 %    Platelets 287 150 - 450 K/uL    MPV 9.5 9.2 - 12.9 fL    Immature Granulocytes 0.4 0.0 - 0.5 %    Gran # (ANC) 15.7 (H) 1.8 - 7.7 K/uL    Immature Grans (Abs) 0.07 (H) 0.00 - 0.04 K/uL    Lymph # 0.7 (L) 1.0 - 4.8 K/uL    Mono # 1.2 (H) 0.3 - 1.0 K/uL    Eos # 0.0 0.0 - 0.5 K/uL    Baso # 0.03 0.00 - 0.20 K/uL    nRBC 0 0 /100 WBC    Gran % 88.3 (H) 38.0 - 73.0 %    Lymph % 4.1 (L) 18.0 - 48.0 %    Mono % 6.9 4.0 - 15.0 %    Eosinophil % 0.1 0.0 - 8.0 %    Basophil % 0.2 0.0 - 1.9 %    Differential Method Automated    Comprehensive metabolic panel   Result Value Ref Range    Sodium 136 136 - 145 mmol/L    Potassium 3.8 3.5 - 5.1 mmol/L    Chloride 104 95 - 110 mmol/L    CO2 17 (L) 23 - 29 mmol/L    Glucose 145 (H) 70 - 110 mg/dL    BUN 12 6 - 20 mg/dL    Creatinine 0.8 0.5 - 1.4 mg/dL    Calcium 9.9 8.7 - 10.5 mg/dL    Total Protein 8.3 6.0 - 8.4 g/dL    Albumin 3.1 (L) 3.5 - 5.2 g/dL    Total Bilirubin 3.4 (H) 0.1 - 1.0 mg/dL    Alkaline Phosphatase 128 55 - 135 U/L    AST 51 (H) 10 - 40 U/L    ALT 65 (H) 10 - 44 U/L    Anion Gap 15 8 - 16 mmol/L    eGFR >60 >60 mL/min/1.73 m^2   Brain natriuretic peptide   Result Value Ref Range    BNP <10 0 - 99 pg/mL   Troponin I   Result Value Ref Range    Troponin I <0.006 0.000 - 0.026 ng/mL   COVID-19 Rapid Screening   Result Value Ref Range    SARS-CoV-2 RNA, Amplification, Qual Negative Negative   Lactic acid, plasma   Result Value Ref Range    Lactate (Lactic Acid) 1.3 0.5 - 2.2 mmol/L   D dimer, quantitative   Result Value Ref Range    D-Dimer 2.68 (H) <0.50 mg/L FEU   Urinalysis, Reflex to Urine Culture Urine, Clean Catch    Specimen: Urine   Result Value Ref Range    Specimen UA Urine, Clean Catch     Color, UA  Orange (A) Yellow, Straw, Christina    Appearance, UA Clear Clear    pH, UA 7.0 5.0 - 8.0    Specific Gravity, UA >1.030 (A) 1.005 - 1.030    Protein, UA 2+ (A) Negative    Glucose, UA Trace (A) Negative    Ketones, UA Negative Negative    Bilirubin (UA) 2+ (A) Negative    Occult Blood UA Negative Negative    Nitrite, UA Negative Negative    Urobilinogen, UA >=8.0 (A) <2.0 EU/dL    Leukocytes, UA Negative Negative   Urinalysis Microscopic   Result Value Ref Range    RBC, UA 2 0 - 4 /hpf    WBC, UA 2 0 - 5 /hpf    WBC Clumps, UA Rare None-Rare    Bacteria None None-Occ /hpf    Hyaline Casts, UA 0 0-1/lpf /lpf    Microscopic Comment SEE COMMENT    Protein, Body Fluid (Reference Lab or Non-[facility]) [facility]; Pleural Fluid, Left   Result Value Ref Range    Protein, Fluid 5.8 See Comment g/dL   LDH, Body Fluid (Reference Lab or Non-[facility]) [facility]; Pleural Fluid, Left   Result Value Ref Range    LD, Fluid 1256 See Comment U/L   Flow Cytometry Analysis (Body Fluid) Pleural Fluid   Result Value Ref Range    Fluid Type PLEURAL     Fluid Interpretation       No abnormal hematopoietic population is detected in this sample.  Correlate  clinically and with cytology.      Fluid Antibodies Analyzed       All analyzed: CD2, CD3, CD4, CD5, CD7, CD8, CD10, CD13, CD19, CD20, CD34,  , KAPPA, LAMBDA,CD45 and 7AAD.      Fluid Comment       Flow cytometric analysis of pleural fluid shows populations of polyclonal B  lymphocytes and T lymphocytes that are immunophenotypically unremarkable.  The blast gate is not increased.  Flow differential:  Lymphocytes 1.3%, Monocytes 12.6%, Granulocytes  85.1%,  Blast  0.4%, Debris/nRBC 0.1%,  Viability 99.4%.     Glucose, Body Fluid (Reference Lab or Non-[facility]) [facility]; Pleural Fluid, Left   Result Value Ref Range    Glucose, Fluid 37 See Comment mg/dL   WBC & Diff,Body Fluid Pleural Fluid, Left   Result Value Ref Range    Body Fluid Type Pleural Fluid, Left     Fluid Appearance Turbid     Fluid  Color Christina     WBC, Body Fluid 18231 /cu mm    Segs, Fluid 30 %    Lymphs, Fluid 2 %    Monocytes/Macrophages, Fluid 9 %    Eos, Fluid 59 %   Comprehensive metabolic panel   Result Value Ref Range    Sodium 136 136 - 145 mmol/L    Potassium 3.6 3.5 - 5.1 mmol/L    Chloride 106 95 - 110 mmol/L    CO2 17 (L) 23 - 29 mmol/L    Glucose 126 (H) 70 - 110 mg/dL    BUN 14 6 - 20 mg/dL    Creatinine 0.8 0.5 - 1.4 mg/dL    Calcium 8.9 8.7 - 10.5 mg/dL    Total Protein 7.3 6.0 - 8.4 g/dL    Albumin 2.6 (L) 3.5 - 5.2 g/dL    Total Bilirubin 2.4 (H) 0.1 - 1.0 mg/dL    Alkaline Phosphatase 118 55 - 135 U/L    AST 57 (H) 10 - 40 U/L    ALT 63 (H) 10 - 44 U/L    Anion Gap 13 8 - 16 mmol/L    eGFR >60 >60 mL/min/1.73 m^2   CBC auto differential   Result Value Ref Range    WBC 16.95 (H) 3.90 - 12.70 K/uL    RBC 4.31 (L) 4.60 - 6.20 M/uL    Hemoglobin 11.7 (L) 14.0 - 18.0 g/dL    Hematocrit 34.8 (L) 40.0 - 54.0 %    MCV 81 (L) 82 - 98 fL    MCH 27.1 27.0 - 31.0 pg    MCHC 33.6 32.0 - 36.0 g/dL    RDW 12.1 11.5 - 14.5 %    Platelets 293 150 - 450 K/uL    MPV 10.3 9.2 - 12.9 fL    Immature Granulocytes 0.8 (H) 0.0 - 0.5 %    Gran # (ANC) 14.3 (H) 1.8 - 7.7 K/uL    Immature Grans (Abs) 0.13 (H) 0.00 - 0.04 K/uL    Lymph # 1.4 1.0 - 4.8 K/uL    Mono # 1.1 (H) 0.3 - 1.0 K/uL    Eos # 0.0 0.0 - 0.5 K/uL    Baso # 0.03 0.00 - 0.20 K/uL    nRBC 0 0 /100 WBC    Gran % 84.4 (H) 38.0 - 73.0 %    Lymph % 8.0 (L) 18.0 - 48.0 %    Mono % 6.5 4.0 - 15.0 %    Eosinophil % 0.1 0.0 - 8.0 %    Basophil % 0.2 0.0 - 1.9 %    Differential Method Automated    Pathologist Review of Laboratory Test   Result Value Ref Range    Pathologist Review, Body Fluid REVIEWED    Magnesium   Result Value Ref Range    Magnesium 1.9 1.6 - 2.6 mg/dL   Basic metabolic panel   Result Value Ref Range    Sodium 136 136 - 145 mmol/L    Potassium 3.7 3.5 - 5.1 mmol/L    Chloride 105 95 - 110 mmol/L    CO2 18 (L) 23 - 29 mmol/L    Glucose 100 70 - 110 mg/dL    BUN 12 6 - 20 mg/dL     Creatinine 0.8 0.5 - 1.4 mg/dL    Calcium 9.2 8.7 - 10.5 mg/dL    Anion Gap 13 8 - 16 mmol/L    eGFR >60 >60 mL/min/1.73 m^2   Drug screen panel, urine emergency   Result Value Ref Range    Benzodiazepines Negative Negative    Methadone metabolites Negative Negative    Cocaine (Metab.) Negative Negative    Opiate Scrn, Ur Presumptive Positive (A) Negative    Barbiturate Screen, Ur Negative Negative    Amphetamine Screen, Ur Negative Negative    THC Presumptive Positive (A) Negative    Phencyclidine Negative Negative    Creatinine, Urine 184.8 23.0 - 375.0 mg/dL    Toxicology Information SEE COMMENT    Protime-INR   Result Value Ref Range    Prothrombin Time 10.9 9.0 - 12.5 sec    INR 1.0 0.8 - 1.2   APTT   Result Value Ref Range    aPTT 30.0 21.0 - 32.0 sec   Hemoglobin A1c   Result Value Ref Range    Hemoglobin A1C 5.0 4.0 - 5.6 %    Estimated Avg Glucose 97 68 - 131 mg/dL   Prealbumin   Result Value Ref Range    Prealbumin 10 (L) 20 - 43 mg/dL   VANCOMYCIN, TROUGH   Result Value Ref Range    Vancomycin-Trough 7.4 (L) 10.0 - 22.0 ug/mL   Basic Metabolic Panel   Result Value Ref Range    Sodium 134 (L) 136 - 145 mmol/L    Potassium 3.9 3.5 - 5.1 mmol/L    Chloride 106 95 - 110 mmol/L    CO2 18 (L) 23 - 29 mmol/L    Glucose 97 70 - 110 mg/dL    BUN 11 6 - 20 mg/dL    Creatinine 0.7 0.5 - 1.4 mg/dL    Calcium 9.2 8.7 - 10.5 mg/dL    Anion Gap 10 8 - 16 mmol/L    eGFR >60 >60 mL/min/1.73 m^2   APTT   Result Value Ref Range    aPTT 28.4 21.0 - 32.0 sec   CBC auto differential   Result Value Ref Range    WBC 13.58 (H) 3.90 - 12.70 K/uL    RBC 4.40 (L) 4.60 - 6.20 M/uL    Hemoglobin 12.0 (L) 14.0 - 18.0 g/dL    Hematocrit 35.4 (L) 40.0 - 54.0 %    MCV 81 (L) 82 - 98 fL    MCH 27.3 27.0 - 31.0 pg    MCHC 33.9 32.0 - 36.0 g/dL    RDW 12.4 11.5 - 14.5 %    Platelets 336 150 - 450 K/uL    MPV 10.3 9.2 - 12.9 fL    Immature Granulocytes 0.5 0.0 - 0.5 %    Gran # (ANC) 10.6 (H) 1.8 - 7.7 K/uL    Immature Grans (Abs) 0.07 (H)  0.00 - 0.04 K/uL    Lymph # 1.8 1.0 - 4.8 K/uL    Mono # 0.9 0.3 - 1.0 K/uL    Eos # 0.1 0.0 - 0.5 K/uL    Baso # 0.03 0.00 - 0.20 K/uL    nRBC 0 0 /100 WBC    Gran % 78.4 (H) 38.0 - 73.0 %    Lymph % 13.3 (L) 18.0 - 48.0 %    Mono % 6.6 4.0 - 15.0 %    Eosinophil % 1.0 0.0 - 8.0 %    Basophil % 0.2 0.0 - 1.9 %    Differential Method Automated    Basic metabolic panel   Result Value Ref Range    Sodium 136 136 - 145 mmol/L    Potassium 4.0 3.5 - 5.1 mmol/L    Chloride 102 95 - 110 mmol/L    CO2 20 (L) 23 - 29 mmol/L    Glucose 119 (H) 70 - 110 mg/dL    BUN 9 6 - 20 mg/dL    Creatinine 0.8 0.5 - 1.4 mg/dL    Calcium 8.2 (L) 8.7 - 10.5 mg/dL    Anion Gap 14 8 - 16 mmol/L    eGFR >60 >60 mL/min/1.73 m^2   Magnesium   Result Value Ref Range    Magnesium 2.1 1.6 - 2.6 mg/dL   Procalcitonin   Result Value Ref Range    Procalcitonin 0.08 <0.25 ng/mL   Hepatic Function Panel   Result Value Ref Range    Total Protein 6.5 6.0 - 8.4 g/dL    Albumin 2.7 (L) 3.5 - 5.2 g/dL    Total Bilirubin 1.1 (H) 0.1 - 1.0 mg/dL    Bilirubin, Direct 0.6 (H) 0.1 - 0.3 mg/dL    AST 84 (H) 10 - 40 U/L     (H) 10 - 44 U/L    Alkaline Phosphatase 145 (H) 55 - 135 U/L   Basic metabolic panel   Result Value Ref Range    Sodium 140 136 - 145 mmol/L    Potassium 3.8 3.5 - 5.1 mmol/L    Chloride 106 95 - 110 mmol/L    CO2 21 (L) 23 - 29 mmol/L    Glucose 111 (H) 70 - 110 mg/dL    BUN 11 6 - 20 mg/dL    Creatinine 0.8 0.5 - 1.4 mg/dL    Calcium 8.8 8.7 - 10.5 mg/dL    Anion Gap 13 8 - 16 mmol/L    eGFR >60 >60 mL/min/1.73 m^2   Magnesium   Result Value Ref Range    Magnesium 2.0 1.6 - 2.6 mg/dL   Hepatic function panel   Result Value Ref Range    Total Protein 7.0 6.0 - 8.4 g/dL    Albumin 2.5 (L) 3.5 - 5.2 g/dL    Total Bilirubin 0.6 0.1 - 1.0 mg/dL    Bilirubin, Direct 0.4 (H) 0.1 - 0.3 mg/dL    AST 52 (H) 10 - 40 U/L     (H) 10 - 44 U/L    Alkaline Phosphatase 127 55 - 135 U/L   Basic metabolic panel   Result Value Ref Range    Sodium  139 136 - 145 mmol/L    Potassium 3.5 3.5 - 5.1 mmol/L    Chloride 107 95 - 110 mmol/L    CO2 21 (L) 23 - 29 mmol/L    Glucose 100 70 - 110 mg/dL    BUN 10 6 - 20 mg/dL    Creatinine 0.8 0.5 - 1.4 mg/dL    Calcium 8.9 8.7 - 10.5 mg/dL    Anion Gap 11 8 - 16 mmol/L    eGFR >60 >60 mL/min/1.73 m^2   Magnesium   Result Value Ref Range    Magnesium 2.0 1.6 - 2.6 mg/dL   CBC auto differential   Result Value Ref Range    WBC 15.38 (H) 3.90 - 12.70 K/uL    RBC 4.37 (L) 4.60 - 6.20 M/uL    Hemoglobin 12.0 (L) 14.0 - 18.0 g/dL    Hematocrit 36.2 (L) 40.0 - 54.0 %    MCV 83 82 - 98 fL    MCH 27.5 27.0 - 31.0 pg    MCHC 33.1 32.0 - 36.0 g/dL    RDW 12.5 11.5 - 14.5 %    Platelets 386 150 - 450 K/uL    MPV 9.6 9.2 - 12.9 fL    Immature Granulocytes 0.8 (H) 0.0 - 0.5 %    Gran # (ANC) 11.1 (H) 1.8 - 7.7 K/uL    Immature Grans (Abs) 0.12 (H) 0.00 - 0.04 K/uL    Lymph # 2.3 1.0 - 4.8 K/uL    Mono # 0.7 0.3 - 1.0 K/uL    Eos # 1.2 (H) 0.0 - 0.5 K/uL    Baso # 0.06 0.00 - 0.20 K/uL    nRBC 0 0 /100 WBC    Gran % 72.2 38.0 - 73.0 %    Lymph % 14.7 (L) 18.0 - 48.0 %    Mono % 4.3 4.0 - 15.0 %    Eosinophil % 7.6 0.0 - 8.0 %    Basophil % 0.4 0.0 - 1.9 %    Differential Method Automated    Basic metabolic panel   Result Value Ref Range    Sodium 139 136 - 145 mmol/L    Potassium 4.7 3.5 - 5.1 mmol/L    Chloride 106 95 - 110 mmol/L    CO2 22 (L) 23 - 29 mmol/L    Glucose 90 70 - 110 mg/dL    BUN 11 6 - 20 mg/dL    Creatinine 0.7 0.5 - 1.4 mg/dL    Calcium 8.6 (L) 8.7 - 10.5 mg/dL    Anion Gap 11 8 - 16 mmol/L    eGFR >60 >60 mL/min/1.73 m^2   Magnesium   Result Value Ref Range    Magnesium 2.0 1.6 - 2.6 mg/dL   CBC auto differential   Result Value Ref Range    WBC 11.43 3.90 - 12.70 K/uL    RBC 4.28 (L) 4.60 - 6.20 M/uL    Hemoglobin 11.7 (L) 14.0 - 18.0 g/dL    Hematocrit 35.4 (L) 40.0 - 54.0 %    MCV 83 82 - 98 fL    MCH 27.3 27.0 - 31.0 pg    MCHC 33.1 32.0 - 36.0 g/dL    RDW 12.6 11.5 - 14.5 %    Platelets 384 150 - 450 K/uL    MPV  9.8 9.2 - 12.9 fL    Immature Granulocytes 1.2 (H) 0.0 - 0.5 %    Gran # (ANC) 7.8 (H) 1.8 - 7.7 K/uL    Immature Grans (Abs) 0.14 (H) 0.00 - 0.04 K/uL    Lymph # 1.8 1.0 - 4.8 K/uL    Mono # 0.6 0.3 - 1.0 K/uL    Eos # 1.1 (H) 0.0 - 0.5 K/uL    Baso # 0.05 0.00 - 0.20 K/uL    nRBC 0 0 /100 WBC    Gran % 68.0 38.0 - 73.0 %    Lymph % 16.0 (L) 18.0 - 48.0 %    Mono % 4.8 4.0 - 15.0 %    Eosinophil % 9.6 (H) 0.0 - 8.0 %    Basophil % 0.4 0.0 - 1.9 %    Differential Method Automated    Type & Screen   Result Value Ref Range    Group & Rh O NEG     Indirect Rajendra NEG    Cytology, Fluid/Wash/Brush   Result Value Ref Range    Final Pathologic Diagnosis       Pleural fluid, left, cytology:  Negative for malignant cells.  Acute inflammation.  Blood present.      Disclaimer       Screening was performed at [facility], [address], [place] [address].           Significant Imaging: I have reviewed all pertinent imaging results/findings within the past 24 hours.    X-Ray Chest AP Portable   Final Result   Abnormal      Interval increase in size of a left pneumothorax as above.      This report was flagged in Epic as abnormal.         Electronically signed by: Renny Mayo   Date:    12/04/2022   Time:    10:05      X-Ray Chest AP Single View   Final Result      No adverse change.  Tiny residual left apical pneumothorax.         Electronically signed by: Jairo Combs Jr., MD   Date:    12/03/2022   Time:    08:21      X-Ray Chest AP Single View   Final Result      No adverse interval change compared to 12/01/2022.         Electronically signed by: Garrett Rodríguez   Date:    12/02/2022   Time:    08:12      X-Ray Chest AP Single View   Final Result      Slightly increased patchy infiltrate left lower lobe which could reflect airspace disease versus aspiration or pulmonary contusion.  Continued follow-up is recommended.         Electronically signed by: Zane Kingston MD    Date:    12/01/2022   Time:    06:59      X-Ray Chest 1 View   Final Result      No acute process seen.         Electronically signed by: Zane Kingston MD   Date:    11/30/2022   Time:    12:34      X-Ray Chest AP Portable   Final Result      In comparison to the prior study, there is no adverse interval changes         Electronically signed by: Zane Kingston MD   Date:    11/28/2022   Time:    12:46      US Thoracentesis with Imaging, Aspiration Only   Final Result      Left-sided thoracentesis without evidence of immediate complication.         Electronically signed by: Zane Kingston MD   Date:    11/28/2022   Time:    12:45      US Abdomen Limited   Final Result      No acute abnormalities         Electronically signed by: Zane Kingston MD   Date:    11/28/2022   Time:    12:44      US Lower Extremity Veins Bilateral   Final Result      No evidence of deep venous thrombosis in either lower extremity.         Electronically signed by: Garrett Rodríguez   Date:    11/28/2022   Time:    09:40      CTA Chest Non-Coronary (PE Studies)   Final Result      No evidence of pulmonary embolism.  Limited bolus tracking.      Dense consolidation seen throughout bilateral lower lobes.   Multiloculated left pleural effusion which is small to moderate.      See additional findings above      All CT scans at this facility use dose modulation, iterative reconstruction and/or weight based dosing when appropriate to reduce radiation dose to as low as reasonably achievable.         Electronically signed by: Zane Kingston MD   Date:    11/28/2022   Time:    09:02      X-ray Chest PA And Lateral   Final Result      Small to moderate left-sided pleural effusion with left basilar atelectasis or airspace disease.         Electronically signed by: Zane Kingston MD   Date:    11/28/2022   Time:    08:05      X-Ray Chest AP Portable   ED Interpretation   Left lower lobe infiltrate      Final Result      Small to moderate left-sided pleural  effusion with left basilar atelectasis or airspace disease.         Electronically signed by: Zane Kingston MD   Date:    11/28/2022   Time:    07:58             Assessment/Plan:      * Empyema  - Etiology unknown .   (+) for Rotten  Teeth    With no sign of active infection .    -S/P Thoracentesis which sow a > 30 l wbc   -Cont IVAB  -Blood and fluid Cxs NGTD    -CTS consulted . S/p VATS =  Multiple loculated pockets of purulent fluid with fibrinous purulent adhesions.  Well developed rind over the left lower lobe.  -CT in place . Will be d/c when outpt is less than 50 cc daily     Tobacco use  -1 pack per day x 15 years approx  -decline nicotine patch  counseled on cessation      Sepsis without acute organ dysfunction  2/2 to PNA an empyema  Cont IVAB  Blood and pleural fluid cx NGTD        Chest pain on breathing  - likely pleuritic in nature, worse with cough/deep breath  - tend troponin, have been negative thus far  - po pain medication prn      PNA (pneumonia)  - IV abx   -Loculated left pleural effusion   -S/P VATS       Acute respiratory failure with hypoxia  Patient with Hypoxic Respiratory failure which is Acute.  he is not on home oxygen. Supplemental oxygen was provided and noted-  .   Signs/symptoms of respiratory failure include- tachypnea and increased work of breathing. Contributing diagnoses includes - Pneumonia Labs and images were reviewed. Patient Has not had a recent ABG. Will treat underlying causes and adjust management of respiratory failure as follows.  - continue iv abx for pna  - duo nebs as needed  - wean o2 to keep sats > 90%        VTE Risk Mitigation (From admission, onward)         Ordered     IP VTE LOW RISK PATIENT  Once         11/29/22 1917     Place SILVINO hose  Until discontinued         11/29/22 1917     Place sequential compression device  Until discontinued         11/28/22 0366                Discharge Planning   IVY:      Code Status: Full Code   Is the patient medically ready  for discharge?:     Reason for patient still in hospital (select all that apply): Treatment  Discharge Plan A: Home, Home with family                  Marco Nguyễn MD  Department of Hospital Medicine   '[facility]

## 2022-12-04 NOTE — ASSESSMENT & PLAN NOTE
The patient is a 37-year-old male with history of tobacco abuse who presented with shortness of breath.  CT scan shows left lung consolidation and a loculated parapneumonic collection.  Thoracentesis shows that the pleural fluid has a turbid appearance and has a markedly [place]BC at [address].cu mm.    The the findings of  loculated pleural effusion on CT, in the setting of consolidated left lower lobe, with an elevated WBC in turbid appearing fluid is compatible with a parapneumonic effusion most likely an empyema.  The patient is a candidate for a VATS procedure with evacuation of empyema.  The risks and benefits of the procedure was explained to the patient.  The patient understands the risks and benefits of surgery and has agreed to proceed with VATS with evacuation of empyema for 11/30/2022.    12/01/2022   The patient is postop day 1 status post left VATS procedure with evacuation of empyema and decortication.  Patient is complaining of significant pain at surgical site.  Will add Toradol to his pain management.  Continue chest tube to water seal.  Continue antibiotic treatment with pip-tazo.    12/02/2022   The patient is postop day 2 status post left VATS procedure and evacuation of empyema with decortication.  Patient has much better pain control.  Continue chest tube salt water seal.  Continue antibiotics.  Will discontinue chest tube when drainage is less than 50 mL in a 24 hour period.    12/03/2022   The patient is postop day 3 status post left VATS procedure with evacuation of empyema decortication.  Chest tube drainage is trending down.  Chest tube output was about 100 mL over the past 24 hours.  Continue antibiotics.  Continue chest tube to water seal.    12/04/2022   The patient is postop day 4 status post left VATS procedure with evacuation of empyema and decortication.  The pleural VAC was incorrectly set up during Pleur-evac chamber change.  Subsequently, patient has an increase in left  pneumothorax.  Will continue Pleur-evac to water-seal once pleural VAC is set up correctly.

## 2022-12-04 NOTE — PLAN OF CARE
AAOx4. Family @ bedside. Chest tube to gravity water seal. Pain is being managed on going. Pt remained free from fall and injury. No sign of any distress noted. Safety precautions alert. Pt asked to call for assistance if needed. IV access clean dry and intact saline locked. Chart checked reviewed. VSS. Plan of care continued. Chart and orders reviewed.

## 2022-12-04 NOTE — SUBJECTIVE & OBJECTIVE
Interval History:  The patient is postop day 4 status post VATS procedure with evacuation of empyema and decortication.    ROS  Medications:  Continuous Infusions:  Scheduled Meds:   albuterol-ipratropium  3 mL Nebulization Q6H WAKE    ampicillin-sulbactim (UNASYN) IVPB  3 g Intravenous Q6H    docusate sodium  100 mg Oral BID    polyethylene glycol  17 g Oral Daily    sertraline  50 mg Oral QHS    traZODone  50 mg Oral QHS     PRN Meds:albuterol-ipratropium, ALPRAZolam, diphenhydrAMINE, hydrOXYzine pamoate, influenza, melatonin, metoclopramide HCl, morphine, ondansetron, oxyCODONE, pneumoc 20-panfilo conj-dip cr(PF), sodium chloride 0.9%, sodium chloride 0.9%     Objective:     Vital Signs (Most Recent):  Temp: 98.3 °F (36.8 °C) (12/04/22 1038)  Pulse: 84 (12/04/22 1038)  Resp: 18 (12/04/22 1038)  BP: (!) 143/88 (12/04/22 1038)  SpO2: 96 % (12/04/22 1038)   Vital Signs (24h Range):  Temp:  [97.6 °F (36.4 °C)-98.7 °F (37.1 °C)] 98.3 °F (36.8 °C)  Pulse:  [73-95] 84  Resp:  [] 18  SpO2:  [95 %-99 %] 96 %  BP: (139-177)/() 143/88     Weight: 88.6 kg (195 lb 5.2 oz)  Body mass index is 24.41 kg/m².    SpO2: 96 %  O2 Device (Oxygen Therapy): room air    Intake/Output - Last 3 Shifts         12/02 0700 12/03 0659 12/03 0700 12/04 0659 12/04 0700 12/05 0659    P.O.  600     I.V. (mL/kg)       IV Piggyback 296.4  351.9    Total Intake(mL/kg) 296.4 (3.4) 600 (6.8) 351.9 (4)    Urine (mL/kg/hr) 350 (0.2)      Stool 0      Chest Tube 75 60 0    Total Output 425 60 0    Net -128.6 +540 +351.9           Urine Occurrence 2 x      Stool Occurrence 3 x 1 x             Lines/Drains/Airways       Drain  Duration                  Y Chest Tube 1 and 2 11/30/22 1143 1 Left Pleural 32 Fr. 2 Left Pleural 32 Fr. 3 days              Peripheral Intravenous Line  Duration                  Peripheral IV - Single Lumen 12/04/22 0629 22 G Anterior;Left Forearm <1 day                    Physical Exam  Constitutional:       Appearance:  Normal appearance.   HENT:      Head: Normocephalic and atraumatic.   Cardiovascular:      Rate and Rhythm: Normal rate and regular rhythm.   Pulmonary:      Breath sounds: Normal breath sounds.   Musculoskeletal:      Right lower leg: No edema.      Left lower leg: No edema.   Skin:     General: Skin is warm and dry.   Neurological:      Mental Status: He is alert and oriented to person, place, and time.   Psychiatric:         Behavior: Behavior normal.       Significant Labs:  All pertinent labs from the last 24 hours have been reviewed.    Significant Diagnostics:  I have reviewed all pertinent imaging results/findings within the past 24 hours.

## 2022-12-04 NOTE — PROGRESS NOTES
[facility] Surg  Cardiothoracic Surgery  Progress Note    Patient Name: Zane Horne Jr.  MRN: [id number]  Admission Date: 11/28/2022  Hospital Length of Stay: 6 days  Code Status: Full Code   Attending Physician: Marco Nguyễn, *   Referring Provider: Self, Aaareferral  Principal Problem:Empyema            Subjective:     Post-Op Info:  Procedure(s) (LRB):  VATS, WITH DECORTICATION, LUNG (Left)  BLOCK, NERVE, INTERCOSTAL, 2 OR MORE (Left)   4 Days Post-Op     Interval History:  The patient is postop day 4 status post VATS procedure with evacuation of empyema and decortication.    ROS  Medications:  Continuous Infusions:  Scheduled Meds:   albuterol-ipratropium  3 mL Nebulization Q6H WAKE    ampicillin-sulbactim (UNASYN) IVPB  3 g Intravenous Q6H    docusate sodium  100 mg Oral BID    polyethylene glycol  17 g Oral Daily    sertraline  50 mg Oral QHS    traZODone  50 mg Oral QHS     PRN Meds:albuterol-ipratropium, ALPRAZolam, diphenhydrAMINE, hydrOXYzine pamoate, influenza, melatonin, metoclopramide HCl, morphine, ondansetron, oxyCODONE, pneumoc 20-panfilo conj-dip cr(PF), sodium chloride 0.9%, sodium chloride 0.9%     Objective:     Vital Signs (Most Recent):  Temp: 98.3 °F (36.8 °C) (12/04/22 1038)  Pulse: 84 (12/04/22 1038)  Resp: 18 (12/04/22 1038)  BP: (!) 143/88 (12/04/22 1038)  SpO2: 96 % (12/04/22 1038)   Vital Signs (24h Range):  Temp:  [97.6 °F (36.4 °C)-98.7 °F (37.1 °C)] 98.3 °F (36.8 °C)  Pulse:  [73-95] 84  Resp:  [] 18  SpO2:  [95 %-99 %] 96 %  BP: (139-177)/() 143/88     Weight: 88.6 kg (195 lb 5.2 oz)  Body mass index is 24.41 kg/m².    SpO2: 96 %  O2 Device (Oxygen Therapy): room air    Intake/Output - Last 3 Shifts         12/02 0700 12/03 0659 12/03 0700 12/04 0659 12/04 0700 12/05 0659    P.O.  600     I.V. (mL/kg)       IV Piggyback 296.4  351.9    Total Intake(mL/kg) 296.4 (3.4) 600 (6.8) 351.9 (4)    Urine (mL/kg/hr) 350 (0.2)      Stool 0      Chest Tube 75 60  0    Total Output 425 60 0    Net -128.6 +540 +351.9           Urine Occurrence 2 x      Stool Occurrence 3 x 1 x             Lines/Drains/Airways       Drain  Duration                  Y Chest Tube 1 and 2 11/30/22 1143 1 Left Pleural 32 Fr. 2 Left Pleural 32 Fr. 3 days              Peripheral Intravenous Line  Duration                  Peripheral IV - Single Lumen 12/04/22 0629 22 G Anterior;Left Forearm <1 day                    Physical Exam  Constitutional:       Appearance: Normal appearance.   HENT:      Head: Normocephalic and atraumatic.   Cardiovascular:      Rate and Rhythm: Normal rate and regular rhythm.   Pulmonary:      Breath sounds: Normal breath sounds.   Musculoskeletal:      Right lower leg: No edema.      Left lower leg: No edema.   Skin:     General: Skin is warm and dry.   Neurological:      Mental Status: He is alert and oriented to person, place, and time.   Psychiatric:         Behavior: Behavior normal.       Significant Labs:  All pertinent labs from the last 24 hours have been reviewed.    Significant Diagnostics:  I have reviewed all pertinent imaging results/findings within the past 24 hours.    Assessment/Plan:     * Empyema  The patient is a 37-year-old male with history of tobacco abuse who presented with shortness of breath.  CT scan shows left lung consolidation and a loculated parapneumonic collection.  Thoracentesis shows that the pleural fluid has a turbid appearance and has a markedly elevated WBC at [address]99.cu mm.    The the findings of  loculated pleural effusion on CT, in the setting of consolidated left lower lobe, with an elevated WBC in turbid appearing fluid is compatible with a parapneumonic effusion most likely an empyema.  The patient is a candidate for a VATS procedure with evacuation of empyema.  The risks and benefits of the procedure was explained to the patient.  The patient understands the risks and benefits of surgery and has agreed to proceed with VATS with  evacuation of empyema for 11/30/2022.    12/01/2022   The patient is postop day 1 status post left VATS procedure with evacuation of empyema and decortication.  Patient is complaining of significant pain at surgical site.  Will add Toradol to his pain management.  Continue chest tube to water seal.  Continue antibiotic treatment with pip-tazo.    12/02/2022   The patient is postop day 2 status post left VATS procedure and evacuation of empyema with decortication.  Patient has much better pain control.  Continue chest tube salt water seal.  Continue antibiotics.  Will discontinue chest tube when drainage is less than 50 mL in a 24 hour period.    12/03/2022   The patient is postop day 3 status post left VATS procedure with evacuation of empyema decortication.  Chest tube drainage is trending down.  Chest tube output was about 100 mL over the past 24 hours.  Continue antibiotics.  Continue chest tube to water seal.    12/04/2022   The patient is postop day 4 status post left VATS procedure with evacuation of empyema and decortication.  The pleural VAC was incorrectly set up during Pleur-evac chamber change.  Subsequently, patient has an increase in left pneumothorax.  Will continue Pleur-evac to water-seal once pleural VAC is set up correctly.        Matt Martinez MD  Cardiothoracic Surgery  [facility]

## 2022-12-04 NOTE — PLAN OF CARE
Bed locked, in lowest position. Call bell in reach. Purposeful rounding done every two hours. Cardiac monitoring in use. Antibiotics administered as ordered. Pt's chest tube drainage system changed out per MD verbal order. Chart check complete. Will continue with plan of care.

## 2022-12-05 VITALS
HEART RATE: 105 BPM | HEIGHT: 75 IN | SYSTOLIC BLOOD PRESSURE: 163 MMHG | TEMPERATURE: 98 F | RESPIRATION RATE: 16 BRPM | OXYGEN SATURATION: 97 % | BODY MASS INDEX: 23.35 KG/M2 | WEIGHT: 187.81 LBS | DIASTOLIC BLOOD PRESSURE: 101 MMHG

## 2022-12-05 LAB
ANION GAP SERPL CALC-SCNC: 14 MMOL/L (ref 8–16)
BACTERIA SPEC AEROBE CULT: NO GROWTH
BASOPHILS # BLD AUTO: 0.1 K/UL (ref 0–0.2)
BASOPHILS NFR BLD: 0.8 % (ref 0–1.9)
BUN SERPL-MCNC: 13 MG/DL (ref 6–20)
CALCIUM SERPL-MCNC: 9.6 MG/DL (ref 8.7–10.5)
CHLORIDE SERPL-SCNC: 106 MMOL/L (ref 95–110)
CO2 SERPL-SCNC: 18 MMOL/L (ref 23–29)
CREAT SERPL-MCNC: 0.9 MG/DL (ref 0.5–1.4)
DIFFERENTIAL METHOD: ABNORMAL
EOSINOPHIL # BLD AUTO: 0.9 K/UL (ref 0–0.5)
EOSINOPHIL NFR BLD: 7.6 % (ref 0–8)
ERYTHROCYTE [DISTWIDTH] IN BLOOD BY AUTOMATED COUNT: 12.7 % (ref 11.5–14.5)
EST. GFR  (NO RACE VARIABLE): >60 ML/MIN/1.73 M^2
GLUCOSE SERPL-MCNC: 114 MG/DL (ref 70–110)
HCT VFR BLD AUTO: 40.6 % (ref 40–54)
HGB BLD-MCNC: 13.6 G/DL (ref 14–18)
IMM GRANULOCYTES # BLD AUTO: 0.15 K/UL (ref 0–0.04)
IMM GRANULOCYTES NFR BLD AUTO: 1.2 % (ref 0–0.5)
LYMPHOCYTES # BLD AUTO: 2.1 K/UL (ref 1–4.8)
LYMPHOCYTES NFR BLD: 16.8 % (ref 18–48)
MAGNESIUM SERPL-MCNC: 2.1 MG/DL (ref 1.6–2.6)
MCH RBC QN AUTO: 27.3 PG (ref 27–31)
MCHC RBC AUTO-ENTMCNC: 33.5 G/DL (ref 32–36)
MCV RBC AUTO: 81 FL (ref 82–98)
MONOCYTES # BLD AUTO: 0.7 K/UL (ref 0.3–1)
MONOCYTES NFR BLD: 5.9 % (ref 4–15)
NEUTROPHILS # BLD AUTO: 8.4 K/UL (ref 1.8–7.7)
NEUTROPHILS NFR BLD: 67.7 % (ref 38–73)
NRBC BLD-RTO: 0 /100 WBC
PLATELET # BLD AUTO: 510 K/UL (ref 150–450)
PMV BLD AUTO: 9.7 FL (ref 9.2–12.9)
POTASSIUM SERPL-SCNC: 4.1 MMOL/L (ref 3.5–5.1)
RBC # BLD AUTO: 4.99 M/UL (ref 4.6–6.2)
SODIUM SERPL-SCNC: 138 MMOL/L (ref 136–145)
WBC # BLD AUTO: 12.4 K/UL (ref 3.9–12.7)

## 2022-12-05 PROCEDURE — 85025 COMPLETE CBC W/AUTO DIFF WBC: CPT | Performed by: INTERNAL MEDICINE

## 2022-12-05 PROCEDURE — 25000003 PHARM REV CODE 250: Performed by: INTERNAL MEDICINE

## 2022-12-05 PROCEDURE — 25000242 PHARM REV CODE 250 ALT 637 W/ HCPCS: Performed by: THORACIC SURGERY (CARDIOTHORACIC VASCULAR SURGERY)

## 2022-12-05 PROCEDURE — 94640 AIRWAY INHALATION TREATMENT: CPT

## 2022-12-05 PROCEDURE — 94761 N-INVAS EAR/PLS OXIMETRY MLT: CPT

## 2022-12-05 PROCEDURE — 63600175 PHARM REV CODE 636 W HCPCS: Performed by: INTERNAL MEDICINE

## 2022-12-05 PROCEDURE — 36415 COLL VENOUS BLD VENIPUNCTURE: CPT | Performed by: THORACIC SURGERY (CARDIOTHORACIC VASCULAR SURGERY)

## 2022-12-05 PROCEDURE — 80048 BASIC METABOLIC PNL TOTAL CA: CPT | Performed by: THORACIC SURGERY (CARDIOTHORACIC VASCULAR SURGERY)

## 2022-12-05 PROCEDURE — 25000003 PHARM REV CODE 250: Performed by: THORACIC SURGERY (CARDIOTHORACIC VASCULAR SURGERY)

## 2022-12-05 PROCEDURE — 83735 ASSAY OF MAGNESIUM: CPT | Performed by: THORACIC SURGERY (CARDIOTHORACIC VASCULAR SURGERY)

## 2022-12-05 PROCEDURE — 94799 UNLISTED PULMONARY SVC/PX: CPT

## 2022-12-05 RX ORDER — AMLODIPINE BESYLATE 5 MG/1
5 TABLET ORAL DAILY
Qty: 30 TABLET | Refills: 0 | Status: SHIPPED | OUTPATIENT
Start: 2022-12-06 | End: 2023-01-05

## 2022-12-05 RX ORDER — AMOXICILLIN AND CLAVULANATE POTASSIUM 875; 125 MG/1; MG/1
1 TABLET, FILM COATED ORAL EVERY 12 HOURS
Qty: 14 TABLET | Refills: 0 | Status: SHIPPED | OUTPATIENT
Start: 2022-12-05 | End: 2022-12-12

## 2022-12-05 RX ADMIN — SODIUM CHLORIDE 3 G: 9 INJECTION, SOLUTION INTRAVENOUS at 12:12

## 2022-12-05 RX ADMIN — IPRATROPIUM BROMIDE AND ALBUTEROL SULFATE 3 ML: 2.5; .5 SOLUTION RESPIRATORY (INHALATION) at 07:12

## 2022-12-05 RX ADMIN — SODIUM CHLORIDE 3 G: 9 INJECTION, SOLUTION INTRAVENOUS at 06:12

## 2022-12-05 RX ADMIN — OXYCODONE HYDROCHLORIDE 5 MG: 5 TABLET ORAL at 04:12

## 2022-12-05 RX ADMIN — OXYCODONE HYDROCHLORIDE 5 MG: 5 TABLET ORAL at 08:12

## 2022-12-05 RX ADMIN — AMLODIPINE BESYLATE 5 MG: 5 TABLET ORAL at 08:12

## 2022-12-05 RX ADMIN — IPRATROPIUM BROMIDE AND ALBUTEROL SULFATE 3 ML: 2.5; .5 SOLUTION RESPIRATORY (INHALATION) at 01:12

## 2022-12-05 NOTE — DISCHARGE SUMMARY
[facility] Medicine  Discharge Summary      Patient Name: Zane Horne Jr.  MRN: [id number]  [facility]: [id number]  Patient Class: IP- Inpatient  Admission Date: 11/28/2022  Hospital Length of Stay: 7 days  Discharge Date and Time:  12/05/2022 12:05 PM  Attending Physician: Isaac Pete MD   Discharging Provider: Isaac Pete MD  Primary Care Provider: Primary Doctor No    Primary Care Team: Networked reference to record PCT     HPI:      Mr. Horne is a 37 year old male with a history of depression and tobacco use (1 pack per day x 15 years) who presents to the ED with complaints of worsening shortness of breath x 3 days assocaited with productive cough and chest pain, symptoms are constant and moderate in nature, no relieving or exacerbating factors.  Upon arrival in the ED, patient tachypneic, tachycardiac, o2 sats mid 80's on room air.  Placed on 2 liters with noted improvement.  Lab work notable for WBC 17, bili 3.4,ALT 65, AST 51, normal troponin and BNP.  EKG showed ST.  Lactic pending.  Chest xray showed PNA.  Patient was given bolus, blood cultures drawn, started on abx and breathing treatments.  He will be admitted to hospital medicine service for acute hypoxic resp failure/sepsis d/t pna.     At assessment, patient lying in bed, o2 sats stable on 2 liters, complaints of yellow tinged productive cough and chest pain that is worse with movement, cough or deep breath. Of note, earlier last month patient did have a lap cr and has since followed up with his surgeon and has been doing well since that time.     Code Status, Full  Surrogate Decision Maker, wife, Mary      Procedure(s) (LRB):  VATS, WITH DECORTICATION, LUNG (Left)  BLOCK, NERVE, INTERCOSTAL, 2 OR MORE (Left)      Hospital Course:   36 y/o WM admitted with a DX of PNA  and  Loculated parapneumonic effusion . Started on IV zosyn and vanc . S/P Thoracentesis  -  250 ccs of dark anibal fluid removed  . Pleural effusion cell diff  show a wbc > 30 k .PH and glucose pleural fluid pending .  CTS consulted  and performed a VATS on 11/30 ( Multiple loculated pockets of purulent fluid with fibrinous purulent adhesions.  Well developed rind over the left lower lobe) . The blood cx and Pleural effusion  cx are NGTD .     12/5  Chest tube removed per CTS. After discussing with CTS, ok to discharge home with po antibiotic(s). Microbiology negative growth to date and others pending. Patient advised to follow up with cts as planned.     Patient seen and evaluated by me. Patient was determined to be suitable for d/c. Patient deemed stable for discharge to home with nurse practitioner to visit.    Patient encouraged and counseled on smoking cessation. Patient motivated.     Goals of Care Treatment Preferences:  Code Status: Full Code      Consults:   Consults (From admission, onward)          Status Ordering Provider     IP consult case management/social work  Once        Provider:  (Not yet assigned)    Completed TAIWO PICKENS     Inpatient consult to Respiratory Care  Once        Provider:  (Not yet assigned)    Acknowledged TAIWO PICKENS     Inpatient consult to Cardiothoracic Surgery  Once        Provider:  Taiwo Pickens MD    Acknowledged SENG CHAVEZ     Inpatient consult to Pulmonology  Once        Provider:  Camilla Nogueira MD    Completed NORMAND, APRIL J.            No new Assessment & Plan notes have been filed under this hospital service since the last note was generated.  Service: Hospital Medicine    Final Active Diagnoses:    Diagnosis Date Noted POA    PRINCIPAL PROBLEM:  Empyema [J86.9] 11/28/2022 Yes    Acute respiratory failure with hypoxia [J96.01] 11/28/2022 Yes    PNA (pneumonia) [J18.9] 11/28/2022 Yes    Chest pain on breathing [R07.1] 11/28/2022 Yes    Sepsis without acute organ dysfunction [A41.9] 11/28/2022 Yes    Tobacco use [Z72.0] 11/28/2022 Yes      Problems Resolved During this Admission:        Discharged Condition: stable    Disposition:     Follow Up:   Follow-up Information       Matt Martinez MD Follow up on 12/14/2022.    Specialty: Cardiothoracic Surgery  Contact information:  [address] [place] [address] [address] [place] [address] [phone]                           Patient Instructions:   No discharge procedures on file.    Significant Diagnostic Studies: Labs:   CMP   Recent Labs   Lab 12/04/22  0708 12/05/22  0624    138   K 4.7 4.1    106   CO2 22* 18*   GLU 90 114*   BUN 11 13   CREATININE 0.7 0.9   CALCIUM 8.6* 9.6   ANIONGAP 11 14   , CBC   Recent Labs   Lab 12/04/22  0708 12/05/22  0624   WBC 11.43 12.40   HGB 11.7* 13.6*   HCT 35.4* 40.6    510*    and All labs within the past 24 hours have been reviewed  Microbiology:   Blood Culture   Lab Results   Component Value Date    LABBLOO No growth after 5 days. 11/28/2022    and pleural effusion studies pending   Radiology: X-Ray: CXR: X-Ray Chest 1 View (CXR):   Results for orders placed or performed during the hospital encounter of 11/28/22   X-Ray Chest AP Single View    Narrative    EXAMINATION:  XR CHEST AP PORTABLE    CLINICAL HISTORY:  post operative;    COMPARISON:  Priors dating back to 11/30/2022 were reviewed.    FINDINGS:  There are 2 left-sided thoracostomy tubes in place.  Tiny left apical pneumothorax measures 5 mm compared to prior 6 mm.  Patchy parenchymal opacity within the left lower lobe is unchanged.  The right lung is clear.  No definite right pleural effusion.  Small left pleural fluid.  Unchanged heart size.  Osseous structures are stable.      Impression    No adverse change.  Tiny residual left apical pneumothorax.      Electronically signed by: Jairo Combs Jr., MD  Date:    12/03/2022  Time:    08:21   , X-Ray Chest PA and Lateral (CXR):   Results for orders placed or performed during the hospital encounter of 11/28/22   X-ray Chest PA And Lateral    Narrative    EXAMINATION:  XR CHEST PA AND  LATERAL    CLINICAL HISTORY:  Shortness of breath;    COMPARISON:  Comparison 11/28/2022    FINDINGS: STUDY IS LIMITED BY POOR INSPIRATION.  Cardiac silhouette is normal.  No pneumothorax.  Small to moderate left-sided pleural effusion with left basilar atelectasis or airspace disease.  Remaining lung fields are grossly clear.  Bones appear intact.      Impression    Small to moderate left-sided pleural effusion with left basilar atelectasis or airspace disease.      Electronically signed by: Zane Kingston MD  Date:    11/28/2022  Time:    08:05    and portable  CT scan:  CTAchest noncoronary  Ultrasound: thoracentesis, abdomen limited, bilateral lower extremities veins    Pending Diagnostic Studies:       Procedure Component Value Units Date/Time    Specimen to Pathology, Surgery Cardiovascular [[id number]] Collected: 11/30/22 1158    Order Status: Sent Lab Status: In process Updated: 11/30/22 1327    Specimen: Tissue            Medications:  Reconciled Home Medications:      Medication List        ASK your doctor about these medications      diclofenac 50 MG EC tablet  Commonly known as: VOLTAREN  Take 1 tablet (50 mg total) by mouth 3 (three) times daily as needed.     sertraline 50 MG tablet  Commonly known as: ZOLOFT  Take 50 mg by mouth once daily.              Indwelling Lines/Drains at time of discharge:   Lines/Drains/Airways       Drain  Duration                  Y Chest Tube 1 and 2 11/30/22 1143 1 Left Pleural 32 Fr. 2 Left Pleural 32 Fr. 5 days                    Time spent on the discharge of patient: 37 minutes         Isaac Pete MD  Department of Hospital Medicine  [facility]

## 2022-12-05 NOTE — PLAN OF CARE
[facility]  Discharge Final Note    Primary Care Provider: Primary Doctor No    Expected Discharge Date: 12/5/2022    Final Discharge Note (most recent)       Final Note - 12/05/22 1235          Final Note    Assessment Type Final Discharge Note     Anticipated Discharge Disposition Home or Self Care     Hospital Resources/Appts/Education Provided Provided patient/caregiver with written discharge plan information;Appointments scheduled and added to AVS        Post-Acute Status    Discharge Delays None known at this time                     Important Message from Medicare             Contact Info       Matt Martinez MD   Specialty: Cardiothoracic Surgery    [address] [place] [address] [address] [place] [address]   Phone: [phone]       Next Steps: Follow up on 12/14/2022

## 2022-12-05 NOTE — PLAN OF CARE
Patient being discharged home in stable condition. Patient remained free from injury/falls during stay.

## 2022-12-05 NOTE — PLAN OF CARE
Bed locked, in lowest position. Call bell in reach. Purposeful rounding done every two hours. Cardiac monitoring in use. Antibiotics administered as ordered. Chart check complete. Will continue with plan of care.

## 2022-12-05 NOTE — SUBJECTIVE & OBJECTIVE
Interval History:  The patient is status post VATS procedure with evacuation of empyema and decortication.    ROS  Medications:  Continuous Infusions:  Scheduled Meds:   albuterol-ipratropium  3 mL Nebulization Q6H WAKE    amLODIPine  5 mg Oral Daily    ampicillin-sulbactim (UNASYN) IVPB  3 g Intravenous Q6H    docusate sodium  100 mg Oral BID    polyethylene glycol  17 g Oral Daily    sertraline  50 mg Oral QHS    traZODone  50 mg Oral QHS     PRN Meds:albuterol-ipratropium, ALPRAZolam, diphenhydrAMINE, hydrALAZINE, hydrOXYzine pamoate, influenza, melatonin, metoclopramide HCl, morphine, ondansetron, oxyCODONE, pneumoc 20-panfilo conj-dip cr(PF), sodium chloride 0.9%, sodium chloride 0.9%     Objective:     Vital Signs (Most Recent):  Temp: 97.5 °F (36.4 °C) (12/05/22 0825)  Pulse: 108 (12/05/22 0825)  Resp: 18 (12/05/22 0859)  BP: (!) 128/102 (12/05/22 0825)  SpO2: 95 % (12/05/22 0825)   Vital Signs (24h Range):  Temp:  [97.4 °F (36.3 °C)-98.1 °F (36.7 °C)] 97.5 °F (36.4 °C)  Pulse:  [] 108  Resp:  [16-20] 18  SpO2:  [95 %-98 %] 95 %  BP: (128-167)/() 128/102     Weight: 85.2 kg (187 lb 13.3 oz)  Body mass index is 23.48 kg/m².    SpO2: 95 %  O2 Device (Oxygen Therapy): room air    Intake/Output - Last 3 Shifts         12/03 0700 12/04 0659 12/04 0700 12/05 0659 12/05 0700 12/06 0659    P.O. 600      IV Piggyback  351.9     Total Intake(mL/kg) 600 (6.8) 351.9 (4.1)     Urine (mL/kg/hr)  325 (0.2)     Stool       Chest Tube 60 30     Total Output 60 355     Net +540 -3.1            Stool Occurrence 1 x              Lines/Drains/Airways       Drain  Duration                  Y Chest Tube 1 and 2 11/30/22 1143 1 Left Pleural 32 Fr. 2 Left Pleural 32 Fr. 4 days              Peripheral Intravenous Line  Duration                  Peripheral IV - Single Lumen 12/04/22 0629 22 G Anterior;Left Forearm 1 day                    Physical Exam  Constitutional:       Appearance: Normal appearance.   HENT:      Head:  Normocephalic and atraumatic.   Eyes:      Extraocular Movements: Extraocular movements intact.      Pupils: Pupils are equal, round, and reactive to light.   Cardiovascular:      Rate and Rhythm: Normal rate and regular rhythm.      Pulses: Normal pulses.      Heart sounds: Normal heart sounds.   Pulmonary:      Effort: Pulmonary effort is normal.      Breath sounds: Normal breath sounds.   Abdominal:      General: Abdomen is flat. Bowel sounds are normal.      Palpations: Abdomen is soft.   Skin:     General: Skin is warm and dry.   Neurological:      Mental Status: He is alert and oriented to person, place, and time.   Psychiatric:         Behavior: Behavior normal.       Significant Labs:  All pertinent labs from the last 24 hours have been reviewed.    Significant Diagnostics:  I have reviewed all pertinent imaging results/findings within the past 24 hours.

## 2022-12-05 NOTE — ASSESSMENT & PLAN NOTE
The patient is a 37-year-old male with history of tobacco abuse who presented with shortness of breath.  CT scan shows left lung consolidation and a loculated parapneumonic collection.  Thoracentesis shows that the pleural fluid has a turbid appearance and has a markedly elevated WBC at 5[address].cu mm.    The the findings of  loculated pleural effusion on CT, in the setting of consolidated left lower lobe, with an elevated WBC in turbid appearing fluid is compatible with a parapneumonic effusion most likely an empyema.  The patient is a candidate for a VATS procedure with evacuation of empyema.  The risks and benefits of the procedure was explained to the patient.  The patient understands the risks and benefits of surgery and has agreed to proceed with VATS with evacuation of empyema for 11/30/2022.    12/01/2022   The patient is postop day 1 status post left VATS procedure with evacuation of empyema and decortication.  Patient is complaining of significant pain at surgical site.  Will add Toradol to his pain management.  Continue chest tube to water seal.  Continue antibiotic treatment with pip-tazo.    12/02/2022   The patient is postop day 2 status post left VATS procedure and evacuation of empyema with decortication.  Patient has much better pain control.  Continue chest tube salt water seal.  Continue antibiotics.  Will discontinue chest tube when drainage is less than 50 mL in a 24 hour period.    12/03/2022   The patient is postop day 3 status post left VATS procedure with evacuation of empyema decortication.  Chest tube drainage is trending down.  Chest tube output was about 100 mL over the past 24 hours.  Continue antibiotics.  Continue chest tube to water seal.    12/04/2022   The patient is postop day 4 status post left VATS procedure with evacuation of empyema and decortication.  The pleural VAC was incorrectly set up during Pleur-evac chamber change.  Subsequently, patient has an increase in left  pneumothorax.  Will continue Pleur-evac to water-seal once pleural VAC is set up correctly.    12/05/2022   The patient is postop day 5 status post VATS procedure with evacuation of empyema and decortication.  Chest tube output has been about 30 mL over the past 24 hours.  Chest x-ray shows no pneumothorax.  Chest tubes have been discontinued.  Okay to discharge patient from the hospital.

## 2022-12-05 NOTE — DISCHARGE INSTRUCTIONS
You have been started on new medication(s) from this hospitalization. Please take as directed and schedule hospital follow up appointment with your primary providers.    Take probiotic/yogurt 2-3 hours after taking antibiotic to avoid diarrhea.    A nurse practitioner may be contacting you to assess your status post-hospitalization.

## 2022-12-05 NOTE — PLAN OF CARE
Pt remained free of injury. Call bell and personal items within reach. VSS. Pain controlled with PRN meds. IV antibiotics administered as ordered. Chest tube intact and drained 30 mls during the shift. Chart check complete.     Problem: Adult Inpatient Plan of Care  Goal: Plan of Care Review  Outcome: Ongoing, Progressing     Problem: Adult Inpatient Plan of Care  Goal: Patient-Specific Goal (Individualized)  Outcome: Ongoing, Progressing     Problem: Adult Inpatient Plan of Care  Goal: Absence of Hospital-Acquired Illness or Injury  Outcome: Ongoing, Progressing     Problem: Adult Inpatient Plan of Care  Goal: Optimal Comfort and Wellbeing  Outcome: Ongoing, Progressing     Problem: Adjustment to Illness (Sepsis/Septic Shock)  Goal: Optimal Coping  Outcome: Ongoing, Progressing     Problem: Bleeding (Sepsis/Septic Shock)  Goal: Absence of Bleeding  Outcome: Ongoing, Progressing

## 2022-12-05 NOTE — PROGRESS NOTES
[facility]  Cardiothoracic Surgery  Progress Note    Patient Name: Zane Horne Jr.  MRN: [id number]  Admission Date: 11/28/2022  Hospital Length of Stay: 7 days  Code Status: Full Code   Attending Physician: Isaac Pete MD   Referring Provider: Self, Aaareferral  Principal Problem:Empyema            Subjective:     Post-Op Info:  Procedure(s) (LRB):  VATS, WITH DECORTICATION, LUNG (Left)  BLOCK, NERVE, INTERCOSTAL, 2 OR MORE (Left)   5 Days Post-Op     Interval History:  The patient is status post VATS procedure with evacuation of empyema and decortication.    ROS  Medications:  Continuous Infusions:  Scheduled Meds:   albuterol-ipratropium  3 mL Nebulization Q6H WAKE    amLODIPine  5 mg Oral Daily    ampicillin-sulbactim (UNASYN) IVPB  3 g Intravenous Q6H    docusate sodium  100 mg Oral BID    polyethylene glycol  17 g Oral Daily    sertraline  50 mg Oral QHS    traZODone  50 mg Oral QHS     PRN Meds:albuterol-ipratropium, ALPRAZolam, diphenhydrAMINE, hydrALAZINE, hydrOXYzine pamoate, influenza, melatonin, metoclopramide HCl, morphine, ondansetron, oxyCODONE, pneumoc 20-panfilo conj-dip cr(PF), sodium chloride 0.9%, sodium chloride 0.9%     Objective:     Vital Signs (Most Recent):  Temp: 97.5 °F (36.4 °C) (12/05/22 0825)  Pulse: 108 (12/05/22 0825)  Resp: 18 (12/05/22 0859)  BP: (!) 128/102 (12/05/22 0825)  SpO2: 95 % (12/05/22 0825)   Vital Signs (24h Range):  Temp:  [97.4 °F (36.3 °C)-98.1 °F (36.7 °C)] 97.5 °F (36.4 °C)  Pulse:  [] 108  Resp:  [16-20] 18  SpO2:  [95 %-98 %] 95 %  BP: (128-167)/() 128/102     Weight: 85.2 kg (187 lb 13.3 oz)  Body mass index is 23.48 kg/m².    SpO2: 95 %  O2 Device (Oxygen Therapy): room air    Intake/Output - Last 3 Shifts         12/03 0700 12/04 0659 12/04 0700 12/05 0659 12/05 0700 12/06 0659    P.O. 600      IV Piggyback  351.9     Total Intake(mL/kg) 600 (6.8) 351.9 (4.1)     Urine (mL/kg/hr)  325 (0.2)     Stool       Chest Tube 60 30      Total Output 60 355     Net +540 -3.1            Stool Occurrence 1 x              Lines/Drains/Airways       Drain  Duration                  Y Chest Tube 1 and 2 11/30/22 1143 1 Left Pleural 32 Fr. 2 Left Pleural 32 Fr. 4 days              Peripheral Intravenous Line  Duration                  Peripheral IV - Single Lumen 12/04/22 0629 22 G Anterior;Left Forearm 1 day                    Physical Exam  Constitutional:       Appearance: Normal appearance.   HENT:      Head: Normocephalic and atraumatic.   Eyes:      Extraocular Movements: Extraocular movements intact.      Pupils: Pupils are equal, round, and reactive to light.   Cardiovascular:      Rate and Rhythm: Normal rate and regular rhythm.      Pulses: Normal pulses.      Heart sounds: Normal heart sounds.   Pulmonary:      Effort: Pulmonary effort is normal.      Breath sounds: Normal breath sounds.   Abdominal:      General: Abdomen is flat. Bowel sounds are normal.      Palpations: Abdomen is soft.   Skin:     General: Skin is warm and dry.   Neurological:      Mental Status: He is alert and oriented to person, place, and time.   Psychiatric:         Behavior: Behavior normal.       Significant Labs:  All pertinent labs from the last 24 hours have been reviewed.    Significant Diagnostics:  I have reviewed all pertinent imaging results/findings within the past 24 hours.    Assessment/Plan:     * Empyema  The patient is a 37-year-old male with history of tobacco abuse who presented with shortness of breath.  CT scan shows left lung consolidation and a loculated parapneumonic collection.  Thoracentesis shows that the pleural fluid has a turbid appearance and has a markedly elevated WBC at 36907.cu mm.    The the findings of  loculated pleural effusion on CT, in the setting of consolidated left lower lobe, with an elevated WBC in turbid appearing fluid is compatible with a parapneumonic effusion most likely an empyema.  The patient is a candidate for a VATS  procedure with evacuation of empyema.  The risks and benefits of the procedure was explained to the patient.  The patient understands the risks and benefits of surgery and has agreed to proceed with VATS with evacuation of empyema for 11/30/2022.    12/01/2022   The patient is postop day 1 status post left VATS procedure with evacuation of empyema and decortication.  Patient is complaining of significant pain at surgical site.  Will add Toradol to his pain management.  Continue chest tube to water seal.  Continue antibiotic treatment with pip-tazo.    12/02/2022   The patient is postop day 2 status post left VATS procedure and evacuation of empyema with decortication.  Patient has much better pain control.  Continue chest tube salt water seal.  Continue antibiotics.  Will discontinue chest tube when drainage is less than 50 mL in a 24 hour period.    12/03/2022   The patient is postop day 3 status post left VATS procedure with evacuation of empyema decortication.  Chest tube drainage is trending down.  Chest tube output was about 100 mL over the past 24 hours.  Continue antibiotics.  Continue chest tube to water seal.    12/04/2022   The patient is postop day 4 status post left VATS procedure with evacuation of empyema and decortication.  The pleural VAC was incorrectly set up during Pleur-evac chamber change.  Subsequently, patient has an increase in left pneumothorax.  Will continue Pleur-evac to water-seal once pleural VAC is set up correctly.    12/05/2022   The patient is postop day 5 status post VATS procedure with evacuation of empyema and decortication.  Chest tube output has been about 30 mL over the past 24 hours.  Chest x-ray shows no pneumothorax.  Chest tubes have been discontinued.  Okay to discharge patient from the hospital.        Matt Martinez MD  Cardiothoracic Surgery  [facility]

## 2022-12-08 ENCOUNTER — PES CALL (OUTPATIENT)
Dept: ADMINISTRATIVE | Facility: CLINIC | Age: 37
End: 2022-12-08
Payer: MEDICAID

## 2022-12-08 LAB — BACTERIA SPEC ANAEROBE CULT: NORMAL

## 2022-12-09 ENCOUNTER — PES CALL (OUTPATIENT)
Dept: ADMINISTRATIVE | Facility: CLINIC | Age: 37
End: 2022-12-09
Payer: MEDICAID

## 2022-12-09 LAB
FINAL PATHOLOGIC DIAGNOSIS: NORMAL
GROSS: NORMAL
Lab: NORMAL

## 2022-12-13 ENCOUNTER — CARE AT HOME (OUTPATIENT)
Dept: HOME HEALTH SERVICES | Facility: CLINIC | Age: 37
End: 2022-12-13
Payer: MEDICAID

## 2022-12-13 VITALS
HEART RATE: 84 BPM | TEMPERATURE: 98 F | SYSTOLIC BLOOD PRESSURE: 138 MMHG | DIASTOLIC BLOOD PRESSURE: 70 MMHG | OXYGEN SATURATION: 99 % | RESPIRATION RATE: 18 BRPM

## 2022-12-13 DIAGNOSIS — J86.9 EMPYEMA: Primary | ICD-10-CM

## 2022-12-13 DIAGNOSIS — J90 PLEURAL EFFUSION: ICD-10-CM

## 2022-12-13 PROCEDURE — 99344 PR HOME VISIT,NEW PATIENT,LEVEL IV: ICD-10-PCS | Mod: ,,,

## 2022-12-13 PROCEDURE — 99344 HOME/RES VST NEW MOD MDM 60: CPT | Mod: ,,,

## 2022-12-13 NOTE — ASSESSMENT & PLAN NOTE
Recent hospital visit due to empyema  S/p left VATS with evacuation of empyema and decortication   Patient reports having 2 days left on Augmentin  Afebrile  F/u with cardiothoracic surgeon 12/15  Monitor

## 2022-12-13 NOTE — PROGRESS NOTES
[facility]  Transition of Care Home Visit    Visit Date: 12/13/2022  Encounter Provider: Renny Sharpe   PCP:  Primary Doctor No    PRESENTING HISTORY      Patient ID: Zane Horne Jr. is a 37 y.o. male.    Consult Requested By:  Dr. Isaac Pete  Reason for Consult:  Hospital Follow Up.    Zane is being seen at home due to being seen at home due to physical debility that presents a taxing effort to leave the home, to mitigate high risk of hospital readmission and/or due to the limited availability of reliable or safe options for transportation to the point of access to the provider. Prior to treatment on this visit the chart was reviewed and patient verbal consent was obtained.      Chief Complaint: Transitional Care        History of Present Illness: Mr. Zane Horne Jr. is a 37 y.o. male who was recently admitted to the hospital.    11/28-12/5 admission presented with worsening shortness of breath over the last 3 days.  Found tachypneic and hypoxic on arrival of EMS.  They provided oxygen his sats went from the upper 80 into the mid 90s.  He notes bilateral chest pain with cough runny nose and sore throat.  Has subjective fever.  Denies any cardiac history or underlying pulmonary history.  ___________________________________________________________________    Today:    HPI:  Patient is being seen today for a tcc visit following a hospital encounter 11/28-12/5 with shortness of breath.  Patient had shortness of breath for 3 days and was hypoxic upon arrival of EMS.  See hospital course for details.  Upon visit patient is ambulating outside his home in no acute distress, VSS, AAOx3.  He reports returning to his baseline health.  Denies reoccurrence of symptoms in which prompted his hospital stay.  He reports compliance with his medications.  He reports having 2 days left of Augmentin.  Denies SOB and is afebrile.  Incision sites  to left flank with no signs of infection.  Patient has follow up  with cardiothoracic surgeon on 12/15.  He reports follow up with primary in a month.  Questions elicited. Time allowed for questions, all issues addressed.  Contact info given for any concerns or changes.              Review of Systems   Constitutional: Negative.    HENT: Negative.     Eyes: Negative.    Respiratory: Negative.     Cardiovascular: Negative.    Gastrointestinal: Negative.    Endocrine: Negative.    Genitourinary: Negative.    Musculoskeletal: Negative.    Skin:  Positive for wound (Left flank, s/p chest tube incisions).   Allergic/Immunologic: Negative.    Neurological: Negative.    Hematological: Negative.    Psychiatric/Behavioral: Negative.       Assessments:  Environmental: Patient lives in a mobile home with steps at the entrance.  Adequate lighting and comfortable temperature.   Functional Status: Independent with ADL's.  Safety: General safety precautions.   Nutritional: Adequate food in the home.   Home Health/DME/Supplies: No home health/DME    PAST HISTORY:     Past Medical History:   Diagnosis Date    Back pain     Bulging disc     L5       Past Surgical History:   Procedure Laterality Date    INJECTION OF ANESTHETIC AGENT AROUND MULTIPLE INTERCOSTAL NERVES Left 11/30/2022    Procedure: BLOCK, NERVE, INTERCOSTAL, 2 OR MORE;  Surgeon: Matt Martinez MD;  Location: [facility];  Service: Cardiothoracic;  Laterality: Left;    THORACOSCOPIC DECORTICATION OF LUNG Left 11/30/2022    Procedure: VATS, WITH DECORTICATION, LUNG;  Surgeon: Matt Martinez MD;  Location: [facility];  Service: Cardiothoracic;  Laterality: Left;  EMPYEMA       No family history on file.    Social History     Socioeconomic History    Marital status:    Tobacco Use    Smoking status: Every Day   Substance and Sexual Activity    Alcohol use: No       MEDICATIONS & ALLERGIES:     Current Outpatient Medications on File Prior to Visit   Medication Sig Dispense Refill    amLODIPine (NORVASC) 5 MG tablet Take 1 tablet (5 mg  total) by mouth once daily. 30 tablet 0    [] amoxicillin-clavulanate 875-125mg (AUGMENTIN) 875-125 mg per tablet Take 1 tablet by mouth every 12 (twelve) hours. for 7 days 14 tablet 0    diclofenac (VOLTAREN) 50 MG EC tablet Take 1 tablet (50 mg total) by mouth 3 (three) times daily as needed. 15 tablet 0    sertraline (ZOLOFT) 50 MG tablet Take 50 mg by mouth once daily.       No current facility-administered medications on file prior to visit.        Review of patient's allergies indicates:   Allergen Reactions    Ultram [tramadol] Rash       OBJECTIVE:     Vital Signs:  Vitals:    22 1508   BP: 138/70   Pulse: 84   Resp: 18   Temp: 98.4 °F (36.9 °C)     There is no height or weight on file to calculate BMI.     Physical Exam:  Physical Exam  Constitutional:       Appearance: Normal appearance. He is normal weight.   HENT:      Nose: Nose normal.      Mouth/Throat:      Mouth: Mucous membranes are moist.      Pharynx: Oropharynx is clear.   Eyes:      Pupils: Pupils are equal, round, and reactive to light.   Cardiovascular:      Rate and Rhythm: Normal rate and regular rhythm.      Pulses: Normal pulses.      Heart sounds: Normal heart sounds.   Pulmonary:      Effort: Pulmonary effort is normal.      Breath sounds: Normal breath sounds.   Abdominal:      General: Bowel sounds are normal.      Palpations: Abdomen is soft.   Musculoskeletal:         General: Normal range of motion.      Cervical back: Normal range of motion.   Skin:     General: Skin is warm and dry.   Neurological:      General: No focal deficit present.      Mental Status: He is alert and oriented to person, place, and time. Mental status is at baseline.   Psychiatric:         Mood and Affect: Mood normal.         Behavior: Behavior normal.         Thought Content: Thought content normal.         Judgment: Judgment normal.       Laboratory  Lab Results   Component Value Date    WBC 12.40 2022    HGB 13.6 (L) 2022    HCT  40.6 12/05/2022    MCV 81 (L) 12/05/2022     (H) 12/05/2022     Lab Results   Component Value Date    INR 1.0 11/29/2022     Lab Results   Component Value Date    HGBA1C 5.0 11/29/2022     No results for input(s): POCTGLUCOSE in the last 72 hours.    Diagnostic Results:      TRANSITION OF CARE:     [facility] Contact Note: 12/8 and 12/9    Family and/or Caretaker present at visit?  Yes.  Diagnostic tests reviewed/disposition: No diagnosic tests pending after this hospitalization.  Disease/illness education: Take all medication as prescribed  Activity as tolerated  Keep all upcoming appts   Home health/community services discussion/referrals: Patient does not have home health established from hospital visit.  They do not need home health.  If needed, we will set up home health for the patient.   Establishment or re-establishment of referral orders for community resources: No other necessary community resources.   Discussion with other health care providers: No discussion with other health care providers necessary.     Transition of Care Visit:  I have reviewed and updated the history and problem list.  I have reconciled the medication list.  I have discussed the hospitalization and current medical issues, prognosis and plans with the patient/family.  I  spent more than 50% of time discussing the care with the patient/family.  Total Face-to-Face Encounter: 60 minutes.    Medications Reconciliation:   I have reconciled the patient's home medications and discharge medications with the patient/family. I have updated all changes.  Refer to After-Visit Medication List.    ASSESSMENT & PLAN:     HIGH RISK CONDITION(S):      Problem List Items Addressed This Visit          Pulmonary    Empyema - Primary    Current Assessment & Plan     Recent hospital visit due to empyema  S/p left VATS with evacuation of empyema and decortication   Patient reports having 2 days left on Augmentin  Afebrile  F/u with  cardiothoracic surgeon 12/15  Monitor            Other Visit Diagnoses        Loculated Paraneumonic left Pleural effusion        CT scan showed left lung consolidation and a loculated parapneumonic collection.   S/p VATS with evacuation of empyema and decortication  On augmentin  monitor             Were controlled substances prescribed?  No    Instructions for the patient:    - Continue all medications, treatments and therapies as ordered.   - Follow all instructions, recommendations as discussed.  - Maintain Safety Precautions at all times.  - Attend all medical appointments as scheduled.  - For worsening symptoms: call Primary Care Physician or Nurse Practitioner.  - For emergencies, call 911 or immediately report to the nearest emergency room.  - Limit Risks of environmental exposure to coronavirus/COVID-19 as discussed including: social distancing, hand hygiene, and limiting departures from the home for necessities only.     Scheduled Follow-up :  Future Appointments   Date Time Provider Department Center   12/15/2022  4:30 PM Bernadette Enriquez MD [facility]       After Visit Medication List :     Medication List            Accurate as of December 13, 2022  3:44 PM. If you have any questions, ask your nurse or doctor.                CONTINUE taking these medications      amLODIPine 5 MG tablet  Commonly known as: NORVASC  Take 1 tablet (5 mg total) by mouth once daily.     diclofenac 50 MG EC tablet  Commonly known as: VOLTAREN  Take 1 tablet (50 mg total) by mouth 3 (three) times daily as needed.     sertraline 50 MG tablet  Commonly known as: ZOLOFT              Signature: Renny Sharpe NP

## 2022-12-15 ENCOUNTER — OFFICE VISIT (OUTPATIENT)
Dept: CARDIOTHORACIC SURGERY | Facility: CLINIC | Age: 37
End: 2022-12-15
Payer: MEDICAID

## 2022-12-15 VITALS
HEART RATE: 73 BPM | DIASTOLIC BLOOD PRESSURE: 84 MMHG | SYSTOLIC BLOOD PRESSURE: 144 MMHG | WEIGHT: 200.38 LBS | BODY MASS INDEX: 24.91 KG/M2 | OXYGEN SATURATION: 98 % | HEIGHT: 75 IN

## 2022-12-15 DIAGNOSIS — J86.9 EMPYEMA: Primary | ICD-10-CM

## 2022-12-15 PROCEDURE — 3079F PR MOST RECENT DIASTOLIC BLOOD PRESSURE 80-89 MM HG: ICD-10-PCS | Mod: CPTII,,, | Performed by: THORACIC SURGERY (CARDIOTHORACIC VASCULAR SURGERY)

## 2022-12-15 PROCEDURE — 1111F DSCHRG MED/CURRENT MED MERGE: CPT | Mod: CPTII,,, | Performed by: THORACIC SURGERY (CARDIOTHORACIC VASCULAR SURGERY)

## 2022-12-15 PROCEDURE — 3077F PR MOST RECENT SYSTOLIC BLOOD PRESSURE >= 140 MM HG: ICD-10-PCS | Mod: CPTII,,, | Performed by: THORACIC SURGERY (CARDIOTHORACIC VASCULAR SURGERY)

## 2022-12-15 PROCEDURE — 3044F HG A1C LEVEL LT 7.0%: CPT | Mod: CPTII,,, | Performed by: THORACIC SURGERY (CARDIOTHORACIC VASCULAR SURGERY)

## 2022-12-15 PROCEDURE — 3079F DIAST BP 80-89 MM HG: CPT | Mod: CPTII,,, | Performed by: THORACIC SURGERY (CARDIOTHORACIC VASCULAR SURGERY)

## 2022-12-15 PROCEDURE — 99999 PR PBB SHADOW E&M-EST. PATIENT-LVL III: CPT | Mod: PBBFAC,,, | Performed by: THORACIC SURGERY (CARDIOTHORACIC VASCULAR SURGERY)

## 2022-12-15 PROCEDURE — 1159F MED LIST DOCD IN RCRD: CPT | Mod: CPTII,,, | Performed by: THORACIC SURGERY (CARDIOTHORACIC VASCULAR SURGERY)

## 2022-12-15 PROCEDURE — 3008F BODY MASS INDEX DOCD: CPT | Mod: CPTII,,, | Performed by: THORACIC SURGERY (CARDIOTHORACIC VASCULAR SURGERY)

## 2022-12-15 PROCEDURE — 3077F SYST BP >= 140 MM HG: CPT | Mod: CPTII,,, | Performed by: THORACIC SURGERY (CARDIOTHORACIC VASCULAR SURGERY)

## 2022-12-15 PROCEDURE — 1159F PR MEDICATION LIST DOCUMENTED IN MEDICAL RECORD: ICD-10-PCS | Mod: CPTII,,, | Performed by: THORACIC SURGERY (CARDIOTHORACIC VASCULAR SURGERY)

## 2022-12-15 PROCEDURE — 99212 PR OFFICE/OUTPT VISIT, EST, LEVL II, 10-19 MIN: ICD-10-PCS | Mod: S$PBB,,, | Performed by: THORACIC SURGERY (CARDIOTHORACIC VASCULAR SURGERY)

## 2022-12-15 PROCEDURE — 3044F PR MOST RECENT HEMOGLOBIN A1C LEVEL <7.0%: ICD-10-PCS | Mod: CPTII,,, | Performed by: THORACIC SURGERY (CARDIOTHORACIC VASCULAR SURGERY)

## 2022-12-15 PROCEDURE — 1111F PR DISCHARGE MEDS RECONCILED W/ CURRENT OUTPATIENT MED LIST: ICD-10-PCS | Mod: CPTII,,, | Performed by: THORACIC SURGERY (CARDIOTHORACIC VASCULAR SURGERY)

## 2022-12-15 PROCEDURE — 99999 PR PBB SHADOW E&M-EST. PATIENT-LVL III: ICD-10-PCS | Mod: PBBFAC,,, | Performed by: THORACIC SURGERY (CARDIOTHORACIC VASCULAR SURGERY)

## 2022-12-15 PROCEDURE — 3008F PR BODY MASS INDEX (BMI) DOCUMENTED: ICD-10-PCS | Mod: CPTII,,, | Performed by: THORACIC SURGERY (CARDIOTHORACIC VASCULAR SURGERY)

## 2022-12-15 PROCEDURE — 99213 OFFICE O/P EST LOW 20 MIN: CPT | Mod: PBBFAC | Performed by: THORACIC SURGERY (CARDIOTHORACIC VASCULAR SURGERY)

## 2022-12-15 PROCEDURE — 99212 OFFICE O/P EST SF 10 MIN: CPT | Mod: S$PBB,,, | Performed by: THORACIC SURGERY (CARDIOTHORACIC VASCULAR SURGERY)

## 2022-12-15 NOTE — PROGRESS NOTES
"Subjective:      Patient ID: Zane Horne Jr. is a 37 y.o. male.    Chief Complaint: No chief complaint on file.    HPI:  37-year-old smoker left-sided pneumonia status post VATS decortication done by Dr. Martinez 2 weeks ago here for follow-up visit no pain issues patient continued to smoke 1-2 cigarettes per day no discharge from the wound no fever no cough with expectoration.    Review of patient's allergies indicates:   Allergen Reactions    Ultram [tramadol] Rash       Review of Systems   Constitutional:  Negative for activity change and appetite change.   HENT:  Negative for dental problem, nosebleeds and sore throat.    Eyes:  Negative for discharge and visual disturbance.   Respiratory:  Negative for cough, chest tightness and stridor.    Cardiovascular:  Negative for leg swelling.   Gastrointestinal:  Negative for abdominal distention and abdominal pain.   Genitourinary:  Negative for difficulty urinating and dysuria.   Musculoskeletal:  Negative for arthralgias, back pain and joint swelling.   Allergic/Immunologic: Negative for environmental allergies.   Neurological:  Negative for dizziness, syncope and headaches.   Hematological:  Does not bruise/bleed easily.   Psychiatric/Behavioral:  Negative for behavioral problems.         Objective:   BP (!) 144/84   Pulse 73   Ht 6' 3" (1.905 m)   Wt 90.9 kg (200 lb 6.4 oz)   SpO2 98%   BMI 25.05 kg/m²     X-Ray Chest AP Portable  Narrative: EXAMINATION:  XR CHEST AP PORTABLE    CLINICAL HISTORY:  s/p left vats;    FINDINGS:  Comparison is made to December 4, 2022.    Left-sided chest tubes remain in place.  No significant pneumothorax or pleural effusion.  No pulmonary consolidation.  Mediastinal silhouette is within normal limits.  Impression: Left-sided chest tubes without significant pneumothorax or pleural effusion.    Electronically signed by: Justus Domingo  Date:    12/05/2022  Time:    08:00         Physical Exam  Vitals and nursing note " reviewed.   Constitutional:       Appearance: Normal appearance.   HENT:      Head: Normocephalic.      Mouth/Throat:      Mouth: Mucous membranes are moist.   Eyes:      Extraocular Movements: Extraocular movements intact.      Pupils: Pupils are equal, round, and reactive to light.   Cardiovascular:      Rate and Rhythm: Normal rate and regular rhythm.      Pulses: Normal pulses.      Heart sounds: Normal heart sounds.   Pulmonary:      Effort: Pulmonary effort is normal.      Breath sounds: Normal breath sounds.      Comments: There are 2 chest tube sutures in the left chest over the 5th intercostal space no evidence of inflammation  Abdominal:      Palpations: Abdomen is soft.   Musculoskeletal:         General: Normal range of motion.      Cervical back: Normal range of motion and neck supple.   Skin:     General: Skin is warm.      Capillary Refill: Capillary refill takes less than 2 seconds.   Neurological:      General: No focal deficit present.      Mental Status: He is alert and oriented to person, place, and time.   Psychiatric:         Mood and Affect: Mood normal.       Assessment:   Status post left VATS decortication for empyema parapneumonic effusion  Smoker COPD        Plan   Smoking cessation counseling given  Continue incentive spirometry  Removed to bedside sutures from the left chest tube drainage site  Activity as tolerated  2 weeks follow-up.          Bernadette Enriquez MD  [facility]  [place]

## 2023-01-04 LAB
FUNGUS SPEC CULT: NORMAL
FUNGUS SPEC CULT: NORMAL

## 2023-01-17 LAB
ACID FAST MOD KINY STN SPEC: NORMAL
MYCOBACTERIUM SPEC QL CULT: NORMAL

## 2023-01-19 LAB
ACID FAST MOD KINY STN SPEC: NORMAL
MYCOBACTERIUM SPEC QL CULT: NORMAL

## 2023-03-06 PROBLEM — A41.9 SEPSIS WITHOUT ACUTE ORGAN DYSFUNCTION: Status: RESOLVED | Noted: 2022-11-28 | Resolved: 2023-03-06

## 2023-03-06 PROBLEM — J18.9 PNA (PNEUMONIA): Status: RESOLVED | Noted: 2022-11-28 | Resolved: 2023-03-06

## 2023-03-06 PROBLEM — J96.01 ACUTE RESPIRATORY FAILURE WITH HYPOXIA: Status: RESOLVED | Noted: 2022-11-28 | Resolved: 2023-03-06

## 2023-11-05 NOTE — ASSESSMENT & PLAN NOTE
- see pleural effusion      Patient : Angel Hicks Age: 69 year old Sex: male   MRN: [id number] Encounter Date: 11/5/2023    History     Chief Complaint   Patient presents with   • Tachycardia       HPI    Angel Hicks is a 69 year old presenting to the emergency department for evaluation with concerns for rapid heart rate.  Patient denies any symptoms.  He does not have chest pain or shortness of breath is only here because his Apple watch alerted him to an elevated heart rate in the 150s.  Patient does have a history of atrial flutter/atrial fibrillation.  The patient has been cardioverted in the past.  Patient takes flecainide and Coreg daily.  The patient is also anticoagulated.      No Known Allergies    No current facility-administered medications for this encounter.     Current Outpatient Medications   Medication Sig   • lisinopril (ZESTRIL) 40 MG tablet TAKE 1 TABLET BY MOUTH EVERY DAY   • flecainide (TAMBOCOR) 50 MG tablet Take 1 tablet by mouth every 12 hours.   • Xarelto 20 MG Tab TAKE 1 TABLET BY MOUTH EVERY DAY WITH DINNER   • carvedilol (COREG) 25 MG tablet TAKE 1 TABLET BY MOUTH EVERY 12 HOURS   • furosemide (LASIX) 20 MG tablet TAKE 1 TABLET BY MOUTH EVERY OTHER DAY   • atorvastatin (LIPITOR) 80 MG tablet Take 1 tablet by mouth daily.   • Multiple Vitamin (MULTI-VITAMIN DAILY PO) Take 1 tablet by mouth daily.       Past Medical History:   Diagnosis Date   • Atrial flutter with rapid ventricular response (CMD) 5/24/2019   • Calculus of gallbladder without mention of cholecystitis or obstruction 2006    Incidental on CT   • Cardiomyopathy, dilated, nonischemic (CMD) 5/24/2019   • Encounter for monitoring amiodarone therapy 5/24/2019   • High cholesterol    • High risk medication use 10/7/2019   • Hypercoagulable state due to atrial fibrillation (CMD) 2/15/2023   • Impaired fasting glucose    • Obesity, unspecified    • Other fibromatoses of muscle, ligament, and fascia(728.79) 6/26/96    mesenteric fibromatosis & gi stromal  tumor, borderline malignant potential   • Other malignant neoplasm of skin, site unspecified    • Unspecified essential hypertension        Past Surgical History:   Procedure Laterality Date   • Ablation atrial flutter - cv  2020   • Adj tiss xfer head,fac,hand 10.1-30  2010    right temple   • Cardiac catherization  2019    Dr. Lynch @ [facility] [place]: Normal coronary angiography. LVEF severely reduced at 20%   • Cardioversion  2019    with Dr Hughes: Successful cardioversion of atrial flutter with 200 J to SR 88 BPM.   • Cardioversion  2023   • Chemosurg mohs 1st stage  , , , , ,5/10 4/12    Mohs, Basal Cell CA   • Chemosurg mohs 1st stage  `    Dr. Gitter Mohs, Squamous Cell CA   • Colonoscopy diagnostic  2008     Normal- next due in 3-5 years due to poor prep - Dr Cummings   • Colonoscopy w biopsy  10/21/2016    tubular adenoma, Diverticulosis - next exam due in 5 years - Dr Cummings   • Destr malig trunk,extrem 1.1-2 cm  , 10/03, , 3/06 9/06    Dr. Jorge Luis Ernst, Basal Cell CA; trunk, arm, leg   • Excis/destruc abd tumors/cysts  1996    exc large abdominal cyst w/associated smooth muscle tumor - Dr. Henry   • Part removal colon w anastomosis  1996    colon resection &right colon resection - abdominal cyst - Dr. Henry   • Past surgical history  2012    local flap closure to moh's defect bcc right cheek with Dr. Lopez   • Resect small intest,singl resec/anas  1996    small bowel resection - Dr. Henry   • Tonsillectomy  age 11 yr old       Family History   Problem Relation Age of Onset   • Cancer Mother    • Atrial Fibrilliation Father    • Cancer, Ovarian Sister    • Patient is unaware of any medical problems Brother        Social History     Tobacco Use   • Smoking status: Former     Current packs/day: 0.00     Types: Pipe, Cigarettes     Quit date: 1984     Years since quittin.8   • Smokeless tobacco: Never   • Tobacco  comments:     pipe for 20 years--never cigarettes   Vaping Use   • Vaping Use: never used   Substance Use Topics   • Alcohol use: Yes     Alcohol/week: 4.0 - 5.0 standard drinks of alcohol     Types: 4 - 5 Standard drinks or equivalent per week   • Drug use: No       Review of Systems     Review of Systems Full 10 point review of systems performed and is otherwise negative unless listed in HPI.      Physical Exam     ED Triage Vitals [11/05/23 1144]   ED Triage Vitals Group      Temp 98.3 °F (36.8 °C)      Heart Rate (!) 156      Resp 16      BP (!) 121/90      SpO2 99 %      EtCO2 mmHg       Height 5' 10\" (1.778 m)      Weight 237 lb 7 oz (107.7 kg)      Weight Scale Used Scale in bed      BMI (Calculated) 34.07      IBW/kg (Calculated) 73       Physical Exam  Vitals and nursing note reviewed.   Constitutional:       Appearance: Normal appearance.   HENT:      Head: Normocephalic.      Right Ear: External ear normal.      Left Ear: External ear normal.      Nose: Nose normal.      Mouth/Throat:      Mouth: Mucous membranes are moist.   Eyes:      Extraocular Movements: Extraocular movements intact.      Conjunctiva/sclera: Conjunctivae normal.   Cardiovascular:      Rate and Rhythm: Tachycardia present. Rhythm irregular.      Pulses: Normal pulses.      Heart sounds: Normal heart sounds.   Pulmonary:      Effort: Pulmonary effort is normal.      Breath sounds: Normal breath sounds.   Abdominal:      General: Abdomen is flat.      Palpations: Abdomen is soft.      Tenderness: There is no abdominal tenderness.   Musculoskeletal:         General: Normal range of motion.   Skin:     General: Skin is warm.      Capillary Refill: Capillary refill takes less than 2 seconds.   Neurological:      General: No focal deficit present.      Mental Status: He is alert and oriented to person, place, and time.   Psychiatric:         Mood and Affect: Mood normal.         Behavior: Behavior normal.           Procedures      Procedures    Lab Results     Results for orders placed or performed during the hospital encounter of 11/05/23   Comprehensive Metabolic Panel   Result Value Ref Range    Fasting Status      Sodium 141 135 - 145 mmol/L    Potassium 4.6 3.4 - 5.1 mmol/L    Chloride 106 97 - 110 mmol/L    Carbon Dioxide 27 21 - 32 mmol/L    Anion Gap 13 7 - 19 mmol/L    Glucose 103 (H) 70 - 99 mg/dL    BUN 14 6 - 20 mg/dL    Creatinine 0.86 0.67 - 1.17 mg/dL    Glomerular Filtration Rate >90 >=60    BUN/Cr 16 7 - 25    Calcium 9.4 8.4 - 10.2 mg/dL    Bilirubin, Total 0.7 0.2 - 1.0 mg/dL    GOT/AST 18 <=37 Units/L    GPT/ALT 31 <64 Units/L    Alkaline Phosphatase 81 45 - 117 Units/L    Albumin 4.1 3.6 - 5.1 g/dL    Protein, Total 7.6 6.4 - 8.2 g/dL    Globulin 3.5 2.0 - 4.0 g/dL    A/G Ratio 1.2 1.0 - 2.4   Magnesium   Result Value Ref Range    Magnesium 2.2 1.7 - 2.4 mg/dL   CBC with Automated Differential (performable only)   Result Value Ref Range    WBC 6.1 4.2 - 11.0 K/mcL    RBC 4.66 4.50 - 5.90 mil/mcL    HGB 15.0 13.0 - 17.0 g/dL    HCT 43.2 39.0 - 51.0 %    MCV 92.7 78.0 - 100.0 fl    MCH 32.2 26.0 - 34.0 pg    MCHC 34.7 32.0 - 36.5 g/dL    RDW-CV 13.4 11.0 - 15.0 %    RDW-SD 45.1 39.0 - 50.0 fL     140 - 450 K/mcL    NRBC 0 <=0 /100 WBC    Neutrophil, Percent 68 %    Lymphocytes, Percent 19 %    Mono, Percent 10 %    Eosinophils, Percent 2 %    Basophils, Percent 1 %    Immature Granulocytes 0 %    Absolute Neutrophils 4.2 1.8 - 7.7 K/mcL    Absolute Lymphocytes 1.1 1.0 - 4.0 K/mcL    Absolute Monocytes 0.6 0.3 - 0.9 K/mcL    Absolute Eosinophils  0.1 0.0 - 0.5 K/mcL    Absolute Basophils 0.1 0.0 - 0.3 K/mcL    Absolute Immature Granulocytes 0.0 0.0 - 0.2 K/mcL       EKG     EKG Interpretation  Rate: 146  Rhythm: atrial fibrillation   Abnormality: yes RVR    Per my review of the EKG tracing, my findings are listed above, awaiting confirmation from cardiology    Radiology Results     Imaging Results          XR  CHEST PA OR AP 1 VIEW (Final result)  Result time 11/05/23 12:51:25    Final result                 Impression:    IMPRESSION:    Negative chest x-ray    Electronically Signed by: Mandeep Richmond MD  Signed on: 11/5/2023 12:51 PM  Created on Workstation ID: [id number]  Signed on Workstation ID: [id number]             Narrative:    EXAMINATION: XR CHEST AP OR PA    HISTORY: cp    COMPARISON: September 11, 2023    FINDINGS: 1 view (s) of the chest are performed.    The heart and mediastinum are normal. The costophrenic angles are sharp.  The pulmonary vasculature is normal. The lungs are clear without  pneumothorax.                                ED Medications     ED Medication Orders (From admission, onward)    Ordered Start     Status Ordering Provider    11/05/23 1212 11/05/23 1213  dilTIAZem (CARDIZEM) tablet 30 mg  ONCE         Last MAR action: Given JAN ALCANTARA    11/05/23 1154 11/05/23 1155  sodium chloride (NORMAL SALINE) 0.9 % bolus 500 mL  ONCE         Last MAR action: Completed JAN ALCANTARA    11/05/23 1153 11/05/23 1154  dilTIAZem (CARDIZEM) injection 20 mg  ONCE         Last MAR action: Given JAN ALCANTARA          ED Course     Vitals:    11/05/23 1144 11/05/23 1201 11/05/23 1209 11/05/23 1230   BP: (!) 121/90 131/78 118/68 118/79   BP Location: RUE - Right upper extremity      Patient Position: Sitting/High-Bella's      Pulse: (!) 156 (!) 150 82 92   Resp: 16 (!) 23 (!) 26 (!) 23   Temp: 98.3 °F (36.8 °C)      TempSrc: Oral      SpO2: 99% 98% 97% 97%   Weight: 107.7 kg (237 lb 7 oz)      Height: 5' 10\" (1.778 m)               Radiology Review: I have independently interpreted the Chest X-Ray and have found No acute or active disease.  I am awaiting on the final radiology read.        Medical Decision Making  Patient seen and examined.  Vital signs reviewed.  Patient evaluated with concerns for elevated heart rate.  Patient was completely asymptomatic on arrival and prior to arrival.  Patient  states he is only here because he was alerted from his Apple watch that his heart rate was in the 140s.  Patient otherwise did not have chest pain or shortness of breath.  Patient is anticoagulated and did discuss cardioversion with the patient this reason however the patient did not wish to be sedated and preferred chemical cardioversion.  The patient was administered Cardizem IV bolus and a tablet and this did rate control the patient however remained in atrial fibrillation.  The patient is discharged in stable and improved condition.  But did take his evening medications as prescribed.  Continue to monitor symptoms and follow-up with electrophysiologist outpatient.  Does know to return emergency department for worsening symptoms or if any concerns.                                 Disposition       Clinical Impression and Diagnosis  1:19 PM       ED Diagnosis     Diagnosis Comment Associated Orders       Final diagnosis    Atrial fibrillation, unspecified type (CMD) -- --          Follow Up:  Benigno Scott MD  [address] [facility] [address]  [phone]                Summary of your Discharge Medications      You have not been prescribed any medications.         Pt is discharged to home/self care in stable condition.                Discharge 11/5/2023 12:52 PM  Angel Hicks discharge to home/self care.                       Emmanuel Jensen,   11/05/23 5144

## 2024-01-25 NOTE — SIGNIFICANT EVENT
Increased edema / Increased shortness of breath   Requested to evaluate patient at 0745, patient with increasing tachypnea, anxiety, tachycardia, increasing O2 support required to maintain SpO2 >92%. Evaluated patient, c/o worsening pain to left lower lung, LUQ, worsening SOB, increasing anxiety. Reviewed labs, noted: Bili: 3.1, urine: dark brown, Vitals: 90% on 6L, R: 30's, P: 110's, normotensive.     Orders placed:   --D-Dimer: 2.68  --US abdomen  --CTA: Loculated Pleural Effusion, negative for PE  --US BLE: Negative DVT  --1 Ltr NS Bolus  --Urinalysis: +Bili  --Consulted Pulmonology  --Xanax ordered PRN      Patient and spouse updated on results of tests ordered, advised patient Pulmonology consulted. Patient and spouse stated understanding.     Critical care time spent on the evaluation and treatment of severe organ dysfunction, review of pertinent labs and imaging studies, discussions with consulting providers and discussions with patient/family: >60 minutes.

## (undated) DEVICE — CONNECTOR TUBING STR 5 IN 1

## (undated) DEVICE — MANIFOLD 4 PORT

## (undated) DEVICE — DRAIN CHEST DRY SUCTION

## (undated) DEVICE — GOWN POLY REINF BRTH SLV XL

## (undated) DEVICE — CONNECTOR Y 3/8X3/8X1/2 STRL

## (undated) DEVICE — ELECTRODE BLD EXT 6.50 ST DISP

## (undated) DEVICE — BLADE 4IN EDGE INSULATED

## (undated) DEVICE — SUT MONOCRYL 4-0 PS-1 UND

## (undated) DEVICE — GLOVE SURG BIOGEL LATEX SZ 7.5

## (undated) DEVICE — TUBE LUKI ASP COLL 6 1/4

## (undated) DEVICE — NDL SAFETY 22G X 1.5 ECLIPSE

## (undated) DEVICE — CONTAINER SPECIMEN OR STER 4OZ

## (undated) DEVICE — SPONGE LAP 18X18 PREWASHED

## (undated) DEVICE — SUT PERMA HAND SILK BLK 0-0

## (undated) DEVICE — TIP SUCTION YANKAUER

## (undated) DEVICE — DRESSING ANTIMICROBIAL 1 INCH

## (undated) DEVICE — TROCAR ENDOPATH XCEL 12X100MM

## (undated) DEVICE — SEE MEDLINE ITEM 152713

## (undated) DEVICE — DRAPE INCISE IOBAN 2 23X17IN

## (undated) DEVICE — SET PNEUMOCLEAR HEAT HUM SE HF

## (undated) DEVICE — CATH THORACIC 32FR ST

## (undated) DEVICE — DRESSING TELFA STRL 4X3 LF

## (undated) DEVICE — TOWEL OR DISP STRL BLUE 4/PK

## (undated) DEVICE — TUBING MEDI-VAC 20FT .25IN

## (undated) DEVICE — TROCAR THORACICI 10-12MM

## (undated) DEVICE — KIT ANTIFOG W/SPONG & FLUID

## (undated) DEVICE — DRAPE LAP T SHT W/ INSTR PAD

## (undated) DEVICE — TAPE SILK 3IN

## (undated) DEVICE — DRESSING MEPORE ISLAND 31/2X4

## (undated) DEVICE — APPLICATOR CHLORAPREP ORN 26ML

## (undated) DEVICE — SYR ONLY LUER LOCK 20CC

## (undated) DEVICE — CATH THOR STND RGHT ANG 32FR

## (undated) DEVICE — DRAPE THREE-QTR REINF 53X77IN

## (undated) DEVICE — ELECTRODE BLADE INSULATED 1 IN

## (undated) DEVICE — TRAY CATH FOL SIL DRN BAG 16FR

## (undated) DEVICE — APPLICATOR STRL COT 2INNR 6IN

## (undated) DEVICE — PACK BASIC SETUP SC BR

## (undated) DEVICE — ELECTRODE REM PLYHSV RETURN 9

## (undated) DEVICE — SOL IRR NACL .9% 3000ML

## (undated) DEVICE — DRAPE INCISE IOBAN 2 23X33IN

## (undated) DEVICE — SYR 50ML CATH TIP

## (undated) DEVICE — IRRIGATOR ENDOSCOPY DISP.

## (undated) DEVICE — COVER OVERHEAD SURG LT BLUE

## (undated) DEVICE — SYR 30CC LUER LOCK

## (undated) DEVICE — SOL NS 1000CC

## (undated) DEVICE — GAUZE SPONGE 4X4 12PLY